# Patient Record
Sex: MALE | Race: BLACK OR AFRICAN AMERICAN | NOT HISPANIC OR LATINO | Employment: OTHER | URBAN - METROPOLITAN AREA
[De-identification: names, ages, dates, MRNs, and addresses within clinical notes are randomized per-mention and may not be internally consistent; named-entity substitution may affect disease eponyms.]

---

## 2023-05-30 ENCOUNTER — HOSPITAL ENCOUNTER (OUTPATIENT)
Dept: DATA CONVERSION | Facility: HOSPITAL | Age: 43
End: 2023-05-30

## 2023-07-14 ENCOUNTER — HOSPITAL ENCOUNTER (OUTPATIENT)
Dept: DATA CONVERSION | Facility: HOSPITAL | Age: 43
End: 2023-07-14
Attending: EMERGENCY MEDICINE

## 2023-07-17 ENCOUNTER — HOSPITAL ENCOUNTER (OUTPATIENT)
Dept: DATA CONVERSION | Facility: HOSPITAL | Age: 43
End: 2023-07-17

## 2023-07-25 ENCOUNTER — HOSPITAL ENCOUNTER (OUTPATIENT)
Dept: DATA CONVERSION | Facility: HOSPITAL | Age: 43
End: 2023-07-25
Attending: EMERGENCY MEDICINE

## 2023-07-25 DIAGNOSIS — Z53.21 PROCEDURE AND TREATMENT NOT CARRIED OUT DUE TO PATIENT LEAVING PRIOR TO BEING SEEN BY HEALTH CARE PROVIDER: ICD-10-CM

## 2023-08-16 ENCOUNTER — HOSPITAL ENCOUNTER (OUTPATIENT)
Dept: DATA CONVERSION | Facility: HOSPITAL | Age: 43
End: 2023-08-16
Attending: EMERGENCY MEDICINE

## 2023-08-16 DIAGNOSIS — Z53.21 PROCEDURE AND TREATMENT NOT CARRIED OUT DUE TO PATIENT LEAVING PRIOR TO BEING SEEN BY HEALTH CARE PROVIDER: ICD-10-CM

## 2023-10-02 ENCOUNTER — HOSPITAL ENCOUNTER (EMERGENCY)
Facility: HOSPITAL | Age: 43
Discharge: HOME | End: 2023-10-02
Payer: COMMERCIAL

## 2023-10-02 PROCEDURE — 4500999001 HC ED NO CHARGE

## 2023-10-03 ENCOUNTER — HOSPITAL ENCOUNTER (EMERGENCY)
Facility: HOSPITAL | Age: 43
Discharge: HOME | End: 2023-10-03
Attending: EMERGENCY MEDICINE
Payer: COMMERCIAL

## 2023-10-03 VITALS
OXYGEN SATURATION: 96 % | SYSTOLIC BLOOD PRESSURE: 105 MMHG | HEIGHT: 68 IN | TEMPERATURE: 98.2 F | DIASTOLIC BLOOD PRESSURE: 78 MMHG | RESPIRATION RATE: 18 BRPM | HEART RATE: 102 BPM | BODY MASS INDEX: 45.47 KG/M2 | WEIGHT: 300 LBS

## 2023-10-03 DIAGNOSIS — M25.532 LEFT WRIST PAIN: Primary | ICD-10-CM

## 2023-10-03 PROCEDURE — 99283 EMERGENCY DEPT VISIT LOW MDM: CPT | Performed by: EMERGENCY MEDICINE

## 2023-10-03 PROCEDURE — 99284 EMERGENCY DEPT VISIT MOD MDM: CPT | Performed by: EMERGENCY MEDICINE

## 2023-10-03 PROCEDURE — 2500000001 HC RX 250 WO HCPCS SELF ADMINISTERED DRUGS (ALT 637 FOR MEDICARE OP): Mod: SE | Performed by: STUDENT IN AN ORGANIZED HEALTH CARE EDUCATION/TRAINING PROGRAM

## 2023-10-03 PROCEDURE — 99282 EMERGENCY DEPT VISIT SF MDM: CPT

## 2023-10-03 RX ORDER — IBUPROFEN 600 MG/1
600 TABLET ORAL ONCE
Status: COMPLETED | OUTPATIENT
Start: 2023-10-03 | End: 2023-10-03

## 2023-10-03 RX ADMIN — IBUPROFEN 600 MG: 600 TABLET, FILM COATED ORAL at 02:54

## 2023-10-03 ASSESSMENT — PAIN SCALES - GENERAL
PAINLEVEL_OUTOF10: 5 - MODERATE PAIN
PAINLEVEL_OUTOF10: 1

## 2023-10-03 ASSESSMENT — LIFESTYLE VARIABLES
HAVE YOU EVER FELT YOU SHOULD CUT DOWN ON YOUR DRINKING: NO
HAVE PEOPLE ANNOYED YOU BY CRITICIZING YOUR DRINKING: NO
EVER HAD A DRINK FIRST THING IN THE MORNING TO STEADY YOUR NERVES TO GET RID OF A HANGOVER: NO
EVER FELT BAD OR GUILTY ABOUT YOUR DRINKING: NO

## 2023-10-03 ASSESSMENT — COLUMBIA-SUICIDE SEVERITY RATING SCALE - C-SSRS
2. HAVE YOU ACTUALLY HAD ANY THOUGHTS OF KILLING YOURSELF?: NO
1. IN THE PAST MONTH, HAVE YOU WISHED YOU WERE DEAD OR WISHED YOU COULD GO TO SLEEP AND NOT WAKE UP?: NO
6. HAVE YOU EVER DONE ANYTHING, STARTED TO DO ANYTHING, OR PREPARED TO DO ANYTHING TO END YOUR LIFE?: NO

## 2023-10-03 ASSESSMENT — PAIN DESCRIPTION - DESCRIPTORS: DESCRIPTORS: CRAMPING

## 2023-10-03 ASSESSMENT — PAIN - FUNCTIONAL ASSESSMENT: PAIN_FUNCTIONAL_ASSESSMENT: 0-10

## 2023-10-12 ENCOUNTER — HOSPITAL ENCOUNTER (EMERGENCY)
Facility: HOSPITAL | Age: 43
Discharge: HOME | End: 2023-10-12
Payer: COMMERCIAL

## 2023-10-12 VITALS
BODY MASS INDEX: 45.47 KG/M2 | SYSTOLIC BLOOD PRESSURE: 135 MMHG | HEART RATE: 96 BPM | TEMPERATURE: 98 F | WEIGHT: 300 LBS | DIASTOLIC BLOOD PRESSURE: 97 MMHG | RESPIRATION RATE: 16 BRPM | OXYGEN SATURATION: 98 % | HEIGHT: 68 IN

## 2023-10-12 VITALS
OXYGEN SATURATION: 98 % | SYSTOLIC BLOOD PRESSURE: 140 MMHG | RESPIRATION RATE: 20 BRPM | HEART RATE: 89 BPM | DIASTOLIC BLOOD PRESSURE: 92 MMHG | TEMPERATURE: 97.5 F

## 2023-10-12 DIAGNOSIS — M54.9 MUSCULOSKELETAL BACK PAIN: Primary | ICD-10-CM

## 2023-10-12 PROCEDURE — 99281 EMR DPT VST MAYX REQ PHY/QHP: CPT

## 2023-10-12 PROCEDURE — 99282 EMERGENCY DEPT VISIT SF MDM: CPT | Performed by: NURSE PRACTITIONER

## 2023-10-12 PROCEDURE — 99283 EMERGENCY DEPT VISIT LOW MDM: CPT

## 2023-10-12 PROCEDURE — 4500999001 HC ED NO CHARGE

## 2023-10-12 PROCEDURE — 99284 EMERGENCY DEPT VISIT MOD MDM: CPT | Performed by: NURSE PRACTITIONER

## 2023-10-12 RX ORDER — ACETAMINOPHEN 325 MG/1
650 TABLET ORAL EVERY 6 HOURS PRN
Qty: 30 TABLET | Refills: 0 | OUTPATIENT
Start: 2023-10-12 | End: 2023-11-02

## 2023-10-12 RX ORDER — ACETAMINOPHEN 325 MG/1
650 TABLET ORAL ONCE
Status: DISCONTINUED | OUTPATIENT
Start: 2023-10-12 | End: 2023-10-13 | Stop reason: HOSPADM

## 2023-10-12 ASSESSMENT — COLUMBIA-SUICIDE SEVERITY RATING SCALE - C-SSRS
1. IN THE PAST MONTH, HAVE YOU WISHED YOU WERE DEAD OR WISHED YOU COULD GO TO SLEEP AND NOT WAKE UP?: NO
1. IN THE PAST MONTH, HAVE YOU WISHED YOU WERE DEAD OR WISHED YOU COULD GO TO SLEEP AND NOT WAKE UP?: NO
6. HAVE YOU EVER DONE ANYTHING, STARTED TO DO ANYTHING, OR PREPARED TO DO ANYTHING TO END YOUR LIFE?: NO
6. HAVE YOU EVER DONE ANYTHING, STARTED TO DO ANYTHING, OR PREPARED TO DO ANYTHING TO END YOUR LIFE?: NO
2. HAVE YOU ACTUALLY HAD ANY THOUGHTS OF KILLING YOURSELF?: NO
2. HAVE YOU ACTUALLY HAD ANY THOUGHTS OF KILLING YOURSELF?: NO

## 2023-10-12 ASSESSMENT — PAIN DESCRIPTION - DESCRIPTORS: DESCRIPTORS: ACHING

## 2023-10-12 ASSESSMENT — PAIN - FUNCTIONAL ASSESSMENT: PAIN_FUNCTIONAL_ASSESSMENT: 0-10

## 2023-10-12 ASSESSMENT — PAIN SCALES - GENERAL: PAINLEVEL_OUTOF10: 7

## 2023-10-13 ENCOUNTER — HOSPITAL ENCOUNTER (EMERGENCY)
Facility: HOSPITAL | Age: 43
Discharge: HOME | End: 2023-10-13
Payer: COMMERCIAL

## 2023-10-13 ENCOUNTER — HOSPITAL ENCOUNTER (EMERGENCY)
Facility: HOSPITAL | Age: 43
Discharge: HOME | End: 2023-10-13
Attending: EMERGENCY MEDICINE
Payer: COMMERCIAL

## 2023-10-13 ENCOUNTER — HOSPITAL ENCOUNTER (EMERGENCY)
Facility: HOSPITAL | Age: 43
Discharge: LEFT BEFORE DISCHARGE | End: 2023-10-13
Payer: COMMERCIAL

## 2023-10-13 VITALS
HEART RATE: 88 BPM | TEMPERATURE: 96.8 F | WEIGHT: 300 LBS | HEIGHT: 68 IN | OXYGEN SATURATION: 96 % | BODY MASS INDEX: 45.47 KG/M2 | RESPIRATION RATE: 16 BRPM | DIASTOLIC BLOOD PRESSURE: 88 MMHG | SYSTOLIC BLOOD PRESSURE: 131 MMHG

## 2023-10-13 DIAGNOSIS — Z00.8 EVALUATION BY PSYCHIATRIC SERVICE REQUIRED: Primary | ICD-10-CM

## 2023-10-13 PROCEDURE — 4500999001 HC ED NO CHARGE

## 2023-10-13 PROCEDURE — 99284 EMERGENCY DEPT VISIT MOD MDM: CPT | Performed by: EMERGENCY MEDICINE

## 2023-10-13 PROCEDURE — 99281 EMR DPT VST MAYX REQ PHY/QHP: CPT | Performed by: EMERGENCY MEDICINE

## 2023-10-13 SDOH — HEALTH STABILITY: MENTAL HEALTH: WISH TO BE DEAD (PAST 1 MONTH): NO

## 2023-10-13 SDOH — HEALTH STABILITY: MENTAL HEALTH: SUICIDAL BEHAVIOR (LIFETIME): NO

## 2023-10-13 SDOH — HEALTH STABILITY: MENTAL HEALTH: NON-SPECIFIC ACTIVE SUICIDAL THOUGHTS (PAST 1 MONTH): NO

## 2023-10-13 SDOH — HEALTH STABILITY: MENTAL HEALTH: ANXIETY SYMPTOMS: GENERALIZED

## 2023-10-13 SDOH — HEALTH STABILITY: MENTAL HEALTH: HAVE YOU EVER TRIED TO KILL YOURSELF?: NO

## 2023-10-13 SDOH — ECONOMIC STABILITY: HOUSING INSECURITY: FEELS SAFE LIVING IN HOME: YES

## 2023-10-13 SDOH — HEALTH STABILITY: MENTAL HEALTH: DEPRESSION SYMPTOMS: LOSS OF INTEREST

## 2023-10-13 SDOH — HEALTH STABILITY: MENTAL HEALTH: IN THE PAST FEW WEEKS, HAVE YOU WISHED YOU WERE DEAD?: NO

## 2023-10-13 SDOH — HEALTH STABILITY: MENTAL HEALTH: ARE YOU HAVING THOUGHTS OF KILLING YOURSELF RIGHT NOW?: NO

## 2023-10-13 SDOH — HEALTH STABILITY: MENTAL HEALTH: IN THE PAST FEW WEEKS, HAVE YOU FELT THAT YOU OR YOUR FAMILY WOULD BE BETTER OFF IF YOU WERE DEAD?: NO

## 2023-10-13 SDOH — HEALTH STABILITY: MENTAL HEALTH: IN THE PAST WEEK, HAVE YOU BEEN HAVING THOUGHTS ABOUT KILLING YOURSELF?: NO

## 2023-10-13 ASSESSMENT — COLUMBIA-SUICIDE SEVERITY RATING SCALE - C-SSRS
2. HAVE YOU ACTUALLY HAD ANY THOUGHTS OF KILLING YOURSELF?: NO
6. HAVE YOU EVER DONE ANYTHING, STARTED TO DO ANYTHING, OR PREPARED TO DO ANYTHING TO END YOUR LIFE?: NO
1. SINCE LAST CONTACT, HAVE YOU WISHED YOU WERE DEAD OR WISHED YOU COULD GO TO SLEEP AND NOT WAKE UP?: NO

## 2023-10-13 ASSESSMENT — LIFESTYLE VARIABLES
SUBSTANCE_ABUSE_PAST_12_MONTHS: NO
PRESCIPTION_ABUSE_PAST_12_MONTHS: NO

## 2023-10-13 ASSESSMENT — PAIN SCALES - GENERAL: PAINLEVEL_OUTOF10: 0 - NO PAIN

## 2023-10-13 NOTE — PROGRESS NOTES
"EPAT - Social Work Psychiatric Assessment    Arrival Details  Mode of Arrival: Ambulatory  Admission Source: Home  Admission Type: Voluntary  EPAT Assessment Start Date: 10/13/23  EPAT Assessment Start Time: 0710  Name of : Joe Carrizales    History of Present Illness  Admission Reason: hallucinations  HPI: Patient is a 43 year old AA male with a history of Schizoaffective Disorder, Depression and Substance use. The patient came into the hospital last evening concerned about chest and back pain he had been having. He was examined and medically cleared by the provider and set up for discharge. Upon Discharge the patient reported he was experiencing hallucinations. He was not specific at the time. He did not endorse any suicidal or homicidal ideations. A review of the provider note was conducted.    SW Readmission Information   Readmission within 30 Days: No    Additional Symptoms - Adult  Generalized Anxiety Disorder: Difficulity concentrating  Obsessive Compulsive Disorder: No problems reported or observed.  Panic Attack: No problems reported or observed.  Post Traumatic Stress Disorder: No problems reported or observed.  Delirium: No problems reported or observed.    Past Psychiatric History/Meds/Treatments  Past Psychiatric History: Patient currently is open with [facility] for counseling and psychiatry. He does \"not remember the last time I went to see them\". He has a history of Schizoaffective Disorder, anxiety and depression. He has had past inpatient stays for hallucinations and other related symptoms. He denied any history of substance treatment. He has no history of suicide attempts but per past reports cut himself in the past.  Past Psychiatric Meds/Treatments: See medication list. Patient states he does take them but per past documentation he has a history of non compliance.  Past Violence/Victimization History: Patient denied    Current Mental Health Contacts   Name/Phone Number: Nando/Jose F " "[facility]   Last Appointment Date: unknown  Provider Name/Phone Number: Perlita Callums, [phone]  Provider Last Appointment Date: unknown    Support System: Community    Living Arrangement: House    Home Safety  Feels Safe Living in Home: Yes    Income Information  Employment Status for: Patient  Employment Status: Unemployed  Income Source: [employer] Service/Education History  Current or Previous  Service: None  Education Level: High school    Social/Cultural History  Social History: Patient is his own guardian. His stressors are his \"body ailments\" and hallucinations.  Important Activities:  (did not state)    Legal  Legal Concerns: no current probation or parole.    Drug Screening  Have you used any substances (canabis, cocaine, heroin, hallucinogens, inhalants, etc.) in the past 12 months?: No  Have you used any prescription drugs other than prescribed in the past 12 months?: No  Is a toxicology screen needed?: No         Psychosocial  Psychosocial (WDL): Within Defined Limits    Orientation  Orientation Level: Oriented X4    General Appearance  Motor Activity: Restlessness  Speech Pattern: Slow  General Attitude: Cooperative  Appearance/Hygiene: Body odor, Disheveled    Thought Process  Coherency:  (none)  Content: Unremarkable  Delusions:  (none)  Perception: Not altered  Hallucination: Auditory  Judgment/Insight: Limited  Confusion: None  Cognition: Appropriate judgement    Sleep Pattern  Sleep Pattern: Unable to assess    Risk Factors  Self Harm/Suicidal Ideation Plan: none  Previous Self Harm/Suicidal Plans: none  Risk Factors: None  Description of Thoughts/Ideas Leaving Unit Now: none    Violence Risk Assessment  Assessment of Violence: None noted  Thoughts of Harm to Others: No    Ability to Assess Risk Screen  Risk Screen - Ability to Assess: Able to be screened  Ask Suicide-Screening Questions  1. In the past few weeks, have you wished you were dead?: No  2. In the " past few weeks, have you felt that you or your family would be better off if you were dead?: No  3. In the past week, have you been having thoughts about killing yourself?: No  4. Have you ever tried to kill yourself?: No  5. Are you having thoughts of killing yourself right now?: No  Calculated Risk Score: No intervention is necessary  [place] Suicide Severity Rating Scale (Screener/Recent Self-Report)  1. Wish to be Dead (Past 1 Month): No  2. Non-Specific Active Suicidal Thoughts (Past 1 Month): No  6. Suicidal Behavior (Lifetime): No  Calculated C-SSRS Risk Score (Lifetime/Recent): No Risk Indicated  Step 1: Risk Factors  Current & Past Psychiatric Dx: Psychotic disorder  Presenting Symptoms: Anxiety and/or panic  Family History: Axis I psychiatric diagnosis requiring hospitalization  Precipitants/Stressors: Triggering events leading to humiliation, shame, and/or despair (e.g. loss of relationship, financial or health status) (real or anticipated)  Change in Treatment: Non-compliant or not receiving treatment  Access to Lethal Methods : No  Step 2: Protective Factors   Protective Factors Internal: Identifies reasons for living  Protective Factors External: Cultural, spiritual and/or moral attitudes against suicide  Step 3: Suicidal Ideation Intensity  Most Severe Suicidal Ideation Identified: none  How Many Times Have You Had These Thoughts: Less than once a week  When You Have the Thoughts How Long do They Last : Fleeting - few seconds or minutes  Could/Can You Stop Thinking About Killing Yourself or Wanting to Die if You Want to: Does not attempt to control thoughts  Are There Things - Anyone or Anything - That Stopped You From Wanting to Die or Acting on: Does not apply  What Sort of Reasons Did You Have For Thinking About Wanting to Die or Killing Yourself: Does not apply  Total Score: 2  Step 5: Documentation  Risk Level: Low suicide risk    Psychiatric Impression and Plan of Care  Assessment and Plan:  "Patient is a 43 year old AA male with a history of Schizoaffective Disorder, Depression and Anxiety who came to the ED with medical concerns. While being treated he stated to his providers that he was hearing voices. The patient has an extensive mental health history. He has had several inpatient stays over the course of a few years. He has a history of non compliance both with appointments and taking his medications. According to the patient and his past documentation he has a long history of chronic hallucinations. Today he stated he \"hears the devil\" but nothing is commanding. He reports he is \"in alot of pain (physical). The patient was appropriate and cooperative. He shared he still sees his providers at [facility] but cannot remember the last appointment. The patient has a history of non compliance with his medications but states he is currently taking them. He reports staying in a group home and feel safe there. Thep patient is help seeking and future oriented. He did appeare disheveled, poor hygiene and body odor. The patient also reports lack of sleep over the past few weeks. The patient is low risk to self harm. Dr. Ford agrees. He did not endorse any homicidal thoughts, hallucinations and did not appear internally stimulated. He stated he was going to \"see my people at [facility]\" for follow up.  Specific Resources Provided to Patient: [facility]   Notified: no  PHP/IOP Recommended: none  Specific Information Provided for PHP/IOP: none  Plan Comments: none    Outcome/Disposition  Patient's Perception of Outcome Achieved: Patient is help seeking  Assessment, Recommendations and Risk Level Reviewed with: Dr. Ford  Contact Name: Zi Caro  Contact Number(s): [phone]  Contact Relationship: father  EPAT Assessment Completed Date: 10/13/23  EPAT Assessment Completed Time: 0752  Patient Disposition: Home    Social Work Note  "

## 2023-10-13 NOTE — Clinical Note
Anibal Caro was seen and treated in our emergency department on 10/13/2023.  He may return to school on 10/13/2023.      If you have any questions or concerns, please don't hesitate to call.      Nadiya Carreon, DO

## 2023-10-13 NOTE — ED PROVIDER NOTES
Limitations to History: none   Additional History Obtained from: none   External records reviewed: prior ED note    Chief complaint:   Chief Complaint   Patient presents with    Psychiatric Evaluation          HPI:      Anibal Caro is a 43 y.o. male with past medical history of Schizophrenia and obesity presenting to the ED with concerns of hearing voices in his head.  Patient states that he normally hears voices, however now they are laughing but is unable to tell me what they are laughing at.  He states that they sound different than they usually are.  Denies any SI or HI at this time.  Is currently medication compliant.  Denies any other complaints at this time.      --------------------------------------------------------------------------------------------------------------------------------------    VS: As documented in the triage note and EMR flowsheet from this visit were reviewed.  Temp 36.6 °C (97.9 °F) HR 94 /80 RR 16 Sat 94 % on      Physical Exam:  GEN:  A&Ox3, no acute distress, appears comfortable. Conversational and appropriate.    HEENT: Normocephalic, atraumatic. Conjunctiva pink with no redness or exudates. Hearing grossly intact. Moist mucous membranes.  CARDIO: Normal rate and regular rhythm. Normal S1, S2  without murmurs, rubs, or gallops.   PULM: Clear to auscultation bilaterally. No rales, rhonchi, or wheezes. No accessory muscle use or stridor.  GI: Soft, non-tender, non-distended. No rebound tenderness or guarding.   SKIN: Warm and dry, no rashes, lesions, petechiae, or purpura.  MSK: ROM intact in all 4 extremities without contractures or pain. No peripheral edema, contusions, or wounds.    NEURO: No focal findings identified. No confusion or gross mental status changes.  PSYCH: Answers questions appropriately, intermittently laughs.        ---------------------------------------------------------------------------------------------------------------------------------------    Medical Decision Making:    All labs and imaging were independently reviewed by me.  Diagnoses as of 10/13/23 0719   Evaluation by psychiatric service required     Medications - No data to display        MDM:  Briefly, this is a 43 y.o. male with past medical history of Schizophrenia and obesity presenting to the ED with concerns of hearing voices in his head, requesting psych evaluation.  Patient has been seen in the emergency department for similar complaints consistently, but has never been admitted.  He is currently medication compliant at this time and denies any drug use.  I discussed EPAT evaluation with the patient and the team, which both are agreeable to patient will be signed out to oncoming team pending EPAT evaluation and pending disposition, however likely discharge home given that he has no SI/HI.      Impression:   1. Evaluation by psychiatric service required          Plan and Disposition: Signout to oncoming provider, pending disposition, probable discharge home.  Return if worse. They understand return precautions and discharge instructions. Patient was in agreement with this plan.        Pelon Johnson DO  Emergency Medicine, PGY-2            Patient was seen and evaluated by the attending physician. The attending ED physician agrees with the plan. Patient and/or patient´s representative was counseled regarding labs, imaging, likely diagnosis, and plan. All questions were answered.    Disclaimer: This note was dictated by speech recognition.  Attempt at proofreading was made to minimize errors.  Errors in transcription may be present.  Please call if questions.     Pelon Johnson DO  Resident  10/13/23 0719

## 2023-10-13 NOTE — ED PROVIDER NOTES
Emergency Department Encounter  [facility] EMERGENCY MEDICINE    Patient: Anibal Caro  MRN: [id number]  : 1980  Date of Evaluation: 10/12/2023  ED Provider: RIGOBERTO Nash      Chief Complaint       Chief Complaint   Patient presents with    body pain        Limitations to History: none  Historian: patient  Records reviewed: EMR inpatient and outpatient notes, Care Everywhere    This is a 43-year-old male well-known to the emergency room with a PMH of schizophrenia and obesity who presents to the emergency room with allover pain.  Patient states that he has back pain, arm pain and leg pain.  Patient states symptoms started several weeks ago.  Denies any fall or injury.  Denies any numbness, tingling or incontinence.  Denies taking any pain medications prior to arrival.  Patient describes his pain as a sharp, constant pain rating it a 10 out of 10.    PMH: Schizophrenia, obesity  PSH: Denies  Allergies: Denies  Social HX: + Smoker, occasional alcohol use, denies any drug use.  Family HX: No family history pertinent to current presenting problem  Medications: Reviewed per EMR    ROS:     Review of Systems  14 systems reviewed and otherwise acutely negative except as in the [place].    Physical Exam:    Appearance: Alert, oriented , cooperative,  in no acute distress. Well nourished & well hydrated.    Skin: Intact,  dry skin, no lesions, rash, petechiae or purpura.     ENT: Hearing grossly intact.     Neck: Supple, without meningismus.     Pulmonary: Clear bilaterally with good chest wall excursion. No rales, rhonchi or wheezing. No accessory muscle use or stridor.    Cardiac: Normal S1, S2 without murmur, rub, gallop or extrasystole. No JVD, Carotids without bruits.    Abdomen: Soft, nontender, active bowel sounds.  No palpable organomegaly.  No rebound or guarding.      Musculoskeletal: Full range of motion. No deformity. Pulses full and equal.  No midline spinal  "tenderness.    Neurological:  Normal sensation, no weakness, no focal findings identified.    Psychiatric: Appropriate mood and affect.       Past History   No past medical history on file.  Past Surgical History:   Procedure Laterality Date    CT AORTA AND BILATERAL ILIOFEMORAL RUNOFF ANGIOGRAM W AND/OR WO IV CONTRAST  11/3/2022    CT AORTA AND BILATERAL ILIOFEMORAL RUNOFF ANGIOGRAM W AND/OR WO IV CONTRAST 11/3/2022 [facility]         Medications/Allergies     Previous Medications    No medications on file     No Known Allergies     Physical Exam       ED Triage Vitals [10/12/23 2030]   Temp Heart Rate Resp BP   36.7 °C (98 °F) 96 16 (!) 135/97      SpO2 Temp src Heart Rate Source Patient Position   98 % -- -- --      BP Location FiO2 (%)     -- --         Patient remained stable while in the emergency department. Patient received Tylenol 650 mg PO once for pain.  Differentials include fracture, musculoskeletal pain, strain, sprain.  Previous outpatient and ED records were reviewed.  Patient does not have any neurological deficits to suggest cord injury or cord compression.  Patient was ambulatory in the emergency room.  Patient was discharged home with a prescription for Tylenol as needed for pain.  Patient was advised to follow-up with his primary care doctor return the emergency room with worsening symptoms.    Diagnostics   Labs:  No results found for this or any previous visit (from the past 24 hour(s)).   Radiographs:  No orders to display           Assessment   In brief, Anibal Caro is a 43 y.o. male who presented to the emergency department with back pain, arm pain and leg pain.        ED Course   Visit Vitals  BP (!) 135/97   Pulse 96   Temp 36.7 °C (98 °F)   Resp 16   Ht 1.727 m (5' 8\")   Wt 136 kg (300 lb)   SpO2 98%   BMI 45.61 kg/m²   Smoking Status Never   BSA 2.55 m²       Medications   acetaminophen (Tylenol) tablet 650 mg (has no administration in time range)           Final Impression  "     1. Musculoskeletal back pain          DISPOSITION  Disposition:     Comment: Please note this report has been produced using speech recognition software and may contain errors related to that system including errors in grammar, punctuation, and spelling, as well as words and phrases that may be inappropriate.  If there are any questions or concerns please feel free to contact the dictating provider for clarification.    ELVIA Nash-RIGOBERTO Tobias  10/12/23 2112

## 2023-10-13 NOTE — ED TRIAGE NOTES
"Pt with a history of schizophrenia comes in tonight stating that he has started hearing voices in his head; states that they are not commanding him to do anything \"just a bunch of laughing\"; states that these are voices he has not heard before which is why he would like to be seen; denies SI/HI/VH  "

## 2023-10-13 NOTE — PROGRESS NOTES
I received Anibal Caro in signout from Dr. Tere Johnson.  Please see the previous note for all HPI, PE and MDM up to the time of signout at 0700.    In brief Anibal Caro is an 43 y.o. male presenting for   Chief Complaint   Patient presents with    Psychiatric Evaluation   .  At the time of signout we were awaiting: EPAT evaluation    Patient is a 43-year-old male with a past medical history schizophrenia obesity presenting to the emergency department with concern for auditory hallucinations.  Patient denied any SI/HI both to me, EPAT, and previous provider per documentation.  EPAT assessed the patient and patient was handed off to me pending this assessment.  They were able to gain further ancillary history which confirmed that the patient was near his psychiatric baseline, did not appear internally stimulated, and appeared able to care for himself.  Patient had a safe disposition back to his group home, and a plan for outpatient follow-up with [facility] for psych.  Patient felt safe with this plan per my reevaluation conversation with him.  Was denying any other acute medical complaints at this time.  Patient will be discharged in stable condition.    Pt Disposition: Discharge    Procedures

## 2023-10-16 ENCOUNTER — HOSPITAL ENCOUNTER (EMERGENCY)
Facility: HOSPITAL | Age: 43
Discharge: HOME | End: 2023-10-16
Payer: COMMERCIAL

## 2023-10-16 ENCOUNTER — HOSPITAL ENCOUNTER (EMERGENCY)
Facility: HOSPITAL | Age: 43
Discharge: PSYCHIATRIC OR SUBSTANCE USE FACILITY | End: 2023-10-17
Attending: EMERGENCY MEDICINE
Payer: COMMERCIAL

## 2023-10-16 DIAGNOSIS — R44.3 HALLUCINATION: Primary | ICD-10-CM

## 2023-10-16 LAB
ALBUMIN SERPL BCP-MCNC: 3.9 G/DL (ref 3.4–5)
ALP SERPL-CCNC: 83 U/L (ref 33–120)
ALT SERPL W P-5'-P-CCNC: 20 U/L (ref 10–52)
ANION GAP SERPL CALC-SCNC: 10 MMOL/L (ref 10–20)
APAP SERPL-MCNC: <10 UG/ML
AST SERPL W P-5'-P-CCNC: 19 U/L (ref 9–39)
BASOPHILS # BLD AUTO: 0.06 X10*3/UL (ref 0–0.1)
BASOPHILS NFR BLD AUTO: 1.3 %
BILIRUB SERPL-MCNC: 0.2 MG/DL (ref 0–1.2)
BUN SERPL-MCNC: 15 MG/DL (ref 6–23)
CALCIUM SERPL-MCNC: 9.7 MG/DL (ref 8.6–10.6)
CHLORIDE SERPL-SCNC: 104 MMOL/L (ref 98–107)
CO2 SERPL-SCNC: 30 MMOL/L (ref 21–32)
CREAT SERPL-MCNC: 0.96 MG/DL (ref 0.5–1.3)
EOSINOPHIL # BLD AUTO: 0.05 X10*3/UL (ref 0–0.7)
EOSINOPHIL NFR BLD AUTO: 1.1 %
ERYTHROCYTE [DISTWIDTH] IN BLOOD BY AUTOMATED COUNT: 14.5 % (ref 11.5–14.5)
ETHANOL SERPL-MCNC: <10 MG/DL
GFR SERPL CREATININE-BSD FRML MDRD: >90 ML/MIN/1.73M*2
GLUCOSE SERPL-MCNC: 90 MG/DL (ref 74–99)
HCT VFR BLD AUTO: 44.3 % (ref 41–52)
HGB BLD-MCNC: 14.4 G/DL (ref 13.5–17.5)
IMM GRANULOCYTES # BLD AUTO: 0.02 X10*3/UL (ref 0–0.7)
IMM GRANULOCYTES NFR BLD AUTO: 0.4 % (ref 0–0.9)
LYMPHOCYTES # BLD AUTO: 1.33 X10*3/UL (ref 1.2–4.8)
LYMPHOCYTES NFR BLD AUTO: 28.5 %
MCH RBC QN AUTO: 27.4 PG (ref 26–34)
MCHC RBC AUTO-ENTMCNC: 32.5 G/DL (ref 32–36)
MCV RBC AUTO: 84 FL (ref 80–100)
MONOCYTES # BLD AUTO: 0.68 X10*3/UL (ref 0.1–1)
MONOCYTES NFR BLD AUTO: 14.6 %
NEUTROPHILS # BLD AUTO: 2.53 X10*3/UL (ref 1.2–7.7)
NEUTROPHILS NFR BLD AUTO: 54.1 %
NRBC BLD-RTO: 0 /100 WBCS (ref 0–0)
PLATELET # BLD AUTO: 285 X10*3/UL (ref 150–450)
PMV BLD AUTO: 10.6 FL (ref 7.5–11.5)
POTASSIUM SERPL-SCNC: 3.8 MMOL/L (ref 3.5–5.3)
PROT SERPL-MCNC: 7.4 G/DL (ref 6.4–8.2)
RBC # BLD AUTO: 5.26 X10*6/UL (ref 4.5–5.9)
SALICYLATES SERPL-MCNC: <3 MG/DL
SODIUM SERPL-SCNC: 140 MMOL/L (ref 136–145)
WBC # BLD AUTO: 4.7 X10*3/UL (ref 4.4–11.3)

## 2023-10-16 PROCEDURE — 93010 ELECTROCARDIOGRAM REPORT: CPT | Performed by: EMERGENCY MEDICINE

## 2023-10-16 PROCEDURE — 80329 ANALGESICS NON-OPIOID 1 OR 2: CPT | Mod: 59

## 2023-10-16 PROCEDURE — 4500999001 HC ED NO CHARGE

## 2023-10-16 PROCEDURE — 85025 COMPLETE CBC W/AUTO DIFF WBC: CPT | Mod: CMCLAB

## 2023-10-16 PROCEDURE — 99285 EMERGENCY DEPT VISIT HI MDM: CPT | Performed by: EMERGENCY MEDICINE

## 2023-10-16 PROCEDURE — 82550 ASSAY OF CK (CPK): CPT | Mod: CMCLAB

## 2023-10-16 PROCEDURE — 80053 COMPREHEN METABOLIC PANEL: CPT | Mod: CMCLAB

## 2023-10-16 PROCEDURE — 36415 COLL VENOUS BLD VENIPUNCTURE: CPT

## 2023-10-16 PROCEDURE — 2500000001 HC RX 250 WO HCPCS SELF ADMINISTERED DRUGS (ALT 637 FOR MEDICARE OP): Mod: SE

## 2023-10-16 PROCEDURE — 99284 EMERGENCY DEPT VISIT MOD MDM: CPT | Performed by: EMERGENCY MEDICINE

## 2023-10-16 PROCEDURE — 80320 DRUG SCREEN QUANTALCOHOLS: CPT | Mod: CMCLAB

## 2023-10-16 RX ORDER — METHOCARBAMOL 500 MG/1
500 TABLET, FILM COATED ORAL ONCE
Status: COMPLETED | OUTPATIENT
Start: 2023-10-16 | End: 2023-10-16

## 2023-10-16 RX ORDER — ACETAMINOPHEN 325 MG/1
650 TABLET ORAL ONCE
Status: COMPLETED | OUTPATIENT
Start: 2023-10-16 | End: 2023-10-16

## 2023-10-16 RX ADMIN — METHOCARBAMOL TABLETS 500 MG: 500 TABLET, COATED ORAL at 16:50

## 2023-10-16 RX ADMIN — ACETAMINOPHEN 650 MG: 325 TABLET ORAL at 16:50

## 2023-10-16 SDOH — HEALTH STABILITY: MENTAL HEALTH: BEHAVIORS/MOOD: OTHER (COMMENT)

## 2023-10-16 ASSESSMENT — COLUMBIA-SUICIDE SEVERITY RATING SCALE - C-SSRS
6. HAVE YOU EVER DONE ANYTHING, STARTED TO DO ANYTHING, OR PREPARED TO DO ANYTHING TO END YOUR LIFE?: NO
1. IN THE PAST MONTH, HAVE YOU WISHED YOU WERE DEAD OR WISHED YOU COULD GO TO SLEEP AND NOT WAKE UP?: NO
2. HAVE YOU ACTUALLY HAD ANY THOUGHTS OF KILLING YOURSELF?: NO
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
## 2023-10-16 NOTE — ED PROVIDER NOTES
HPI   Chief Complaint   Patient presents with    Back Pain    Arm Injury       Patient is a 43-year-old male with history of schizophrenia and obesity, presenting to the emergency department today for mental health assessment, and lower back pain/left hand pain.  Patient reports since his discharge from the emergency department 3 days ago that his mental health has been worsening. He reports he is now hearing voices from the devil and is worried that he might do something to hurt himself.  Has attempted self injury before by slicing his left wrist in the past.  Frequently hears voices telling him to harm himself.  Reports for the past couple months/years now has had difficulty with hand movements and feels like his hand getting weaker as well. Reports lower back pain located to the Lumbar region, no saddle anesthesia or urinary incontinence.  Denies homicidal ideation today.  Reports no fevers no chills, chest pain, shortness of breath, abdominal pain, or change in urination/stool.                      No data recorded                Patient History   History reviewed. No pertinent past medical history.  Past Surgical History:   Procedure Laterality Date    CT AORTA AND BILATERAL ILIOFEMORAL RUNOFF ANGIOGRAM W AND/OR WO IV CONTRAST  11/3/2022    CT AORTA AND BILATERAL ILIOFEMORAL RUNOFF ANGIOGRAM W AND/OR WO IV CONTRAST 11/3/2022 [facility] [place] [facility]     No family history on file.  Social History     Tobacco Use    Smoking status: Never    Smokeless tobacco: Never   Vaping Use    Vaping Use: Never used   Substance Use Topics    Alcohol use: Never    Drug use: Not on file       Physical Exam   ED Triage Vitals [10/16/23 1553]   Temp Heart Rate Resp BP   36.6 °C (97.9 °F) 93 16 (!) 178/100      SpO2 Temp Source Heart Rate Source Patient Position   98 % Temporal -- --      BP Location FiO2 (%)     -- --       Physical Exam    ED Course & MDM   Diagnoses as of 10/17/23 1507   Hallucination       Medical Decision  "Making  Patient is a 43-year-old male presenting to the emergency department today for mental health check and lower back pain.  On arrival, blood pressure 178/100.  Rest of vitals within normal limits.  On examination, patient appears disheveled has a poor body odor. Is not taking care of himself at this time.  Reports he has been taking his Haldol and Cogentin and has been compliant.  Is calm and cooperative and able to answer questions appropriately in the emergency department.  Reports that the voices have been talking to him a lot more recently and is worried that he might do something bad.  When asked if he has an active plan for hurting himself today he says no, however reports he has heard a lot of voices telling him to harm himself recently he is aware of these and afraid of them. Says \"I have a cloudy brain and cloudy pain\".  He was assessed 3 days ago for similar complaint and at that time was found to be not a threat to himself.  However he given his recent presentations and return back to the emergency department today, will be engage EPAT for evaluation Robaxin and tylenol given for pain. Patient signed out to oncoming provider with EPAT placement pending.    Nitish Rodriguez MD  Emergency Medicine PGY2      Procedure  Procedures     Nitish Rodriguez MD  Resident  10/17/23 1510    "

## 2023-10-17 VITALS
SYSTOLIC BLOOD PRESSURE: 124 MMHG | HEART RATE: 78 BPM | OXYGEN SATURATION: 100 % | TEMPERATURE: 98.1 F | RESPIRATION RATE: 18 BRPM | DIASTOLIC BLOOD PRESSURE: 67 MMHG

## 2023-10-17 LAB
AMPHETAMINES UR QL SCN: NORMAL
APPEARANCE UR: CLEAR
BARBITURATES UR QL SCN: NORMAL
BENZODIAZ UR QL SCN: NORMAL
BILIRUB UR STRIP.AUTO-MCNC: NEGATIVE MG/DL
BZE UR QL SCN: NORMAL
CANNABINOIDS UR QL SCN: NORMAL
CK SERPL-CCNC: 324 U/L (ref 0–325)
COLOR UR: YELLOW
FENTANYL+NORFENTANYL UR QL SCN: NORMAL
GLUCOSE UR STRIP.AUTO-MCNC: NEGATIVE MG/DL
KETONES UR STRIP.AUTO-MCNC: NEGATIVE MG/DL
LEUKOCYTE ESTERASE UR QL STRIP.AUTO: NEGATIVE
NITRITE UR QL STRIP.AUTO: NEGATIVE
OPIATES UR QL SCN: NORMAL
OXYCODONE+OXYMORPHONE UR QL SCN: NORMAL
PCP UR QL SCN: NORMAL
PH UR STRIP.AUTO: 6 [PH]
PROT UR STRIP.AUTO-MCNC: NEGATIVE MG/DL
RBC # UR STRIP.AUTO: NEGATIVE /UL
SARS-COV-2 RNA RESP QL NAA+PROBE: NOT DETECTED
SP GR UR STRIP.AUTO: 1.01
UROBILINOGEN UR STRIP.AUTO-MCNC: <2 MG/DL

## 2023-10-17 PROCEDURE — 81003 URINALYSIS AUTO W/O SCOPE: CPT | Performed by: STUDENT IN AN ORGANIZED HEALTH CARE EDUCATION/TRAINING PROGRAM

## 2023-10-17 PROCEDURE — 87635 SARS-COV-2 COVID-19 AMP PRB: CPT

## 2023-10-17 PROCEDURE — 80307 DRUG TEST PRSMV CHEM ANLYZR: CPT | Mod: CMCLAB | Performed by: STUDENT IN AN ORGANIZED HEALTH CARE EDUCATION/TRAINING PROGRAM

## 2023-10-17 NOTE — ED NOTES
ASSUMED C/O PT VS LISTED PT COVID SWAB SENT AWAITING ON BED PLACEMENT. ALL BELONGINGS SECURED. PT PLACED IN 2 GOWNS SEARCHED SAFE. PT COOPERATIVE.     Katelyn Dimas RN  10/17/23 0893

## 2023-10-18 NOTE — PROGRESS NOTES
ED care was transitioned to me.  Please see the earlier provider note for further details.  Patient is pending placement at a psych care facility.    Patient was accepted to [facility] at [place] with Dr. Mosquera for acutely decompensated schizophrenia.  Patient has not required any additional medications.

## 2023-10-18 NOTE — SIGNIFICANT EVENT
Application for Emergency Admission      Ready for Transfer?  Is the patient medically cleared for transfer to inpatient psychiatry: Yes  Has the patient been accepted to an inpatient psychiatric hospital: Yes    Application for Emergency Admission  IN ACCORDANCE WITH [place]  The Chief Clinical Officer of: Almas rinaldi Jamie 10/17/2023 .8:41 PM    Reason for Hospitalization  The undersigned has reason to believe that: Anibal Caro Is a mentally ill person subject to hospitalization by court order under division B Section 5122.01 of the Revised Code, i.e., this person:    1.Yes  Represents a substantial risk of physical harm to self as manifested by evidence of threats of, or attempts at, suicide or serious self-inflicted bodily harm    2.Yes Represents a substantial risk of physical harm to others as manifested by evidence of recent homicidal or other violent behavior, evidence of recent threats that place another in reasonable fear of violent behavior and serious physical harm, or other evidence of present dangerousness    3.Yes Represents a substantial and immediate risk of serious physical impairment or injury to self as manifested by  evidence that the person is unable to provide for and is not providing for the person's basic physical needs because of the person's mental illness and that appropriate provision for those needs cannot be made  immediately available in the community    4.Yes Would benefit from treatment in a hospital for his mental illness and is in need of such treatment as manifested by evidence of behavior that creates a grave and imminent risk to substantial rights of others or  himself.    5.Yes Would benefit from treatment as manifested by evidence of behavior that indicates all of the following:       (a) The person is unlikely to survive safely in the community without supervision, based on a clinical determination.       (b) The person has a history of lack of  compliance with treatment for mental illness and one of the following applies:      (i) At least twice within the thirty-six months prior to the filing of an affidavit seeking court-ordered treatment of the person under section 5122.111 of the Revised Code, the lack of compliance has been a significant factor in necessitating hospitalization in a hospital or receipt of services in a forensic or other mental health unit of a correctional facility, provided that the thirty-six-month period shall be extended by the length of any hospitalization or incarceration of the person that occurred within the thirty-six-month period.      (ii) Within the forty-eight months prior to the filing of an affidavit seeking court-ordered treatment of the person under section 5122.111 of the Revised Code, the lack of compliance resulted in one or more acts of serious violent behavior toward self or others or threats of, or attempts at, serious physical harm to self or others, provided that the forty-eight-month period shall be extended by the length of any hospitalization or incarceration of the person that occurred within the forty-eight-month period.      (c) The person, as a result of mental illness, is unlikely to voluntarily participate in necessary treatment.       (d) In view of the person's treatment history and current behavior, the person is in need of treatment in order to prevent a relapse or deterioration that would be likely to result in substantial risk of serious harm to the person or others.    (e) Represents a substantial risk of physical harm to self or others if allowed to remain at liberty pending examination.    Therefore, it is requested that said person be admitted to the above named facility.    STATEMENT OF BELIEF    Must be filled out by one of the following: a psychiatrist, licensed physician, licensed clinical psychologist, health or ,  or .  (Statement shall include the  circumstances under which the individual was taken into custody and the reason for the person's belief that hospitalization is necessary. The statement shall also include a reference to efforts made to secure the individual's property at his residence if he was taken into custody there. Every reasonable and appropriate effort should be made to take this person into custody in the least conspicuous manner possible.)    Patient is a 43-year-old male who is at risk of harming himself.  Patient has decompensated schizophrenia with auditory hallucinations.    Kelsey Peralta DO 10/17/2023     _____________________________________________________________   Place of Employment: [employer] [facility] [employer]    STATEMENT OF OBSERVATION BY PSYCHIATRIST, LICENSED PHYSICIAN, OR LICENSED CLINICAL PSYCHOLOGIST, IF APPLICABLE    Place of Observation (e.g., [facility] mental health center, general hospital, office, emergency facility)  (If applicable, please complete)    Kelsey Peralta DO 10/17/2023    _____________________________________________________________

## 2023-11-02 ENCOUNTER — HOSPITAL ENCOUNTER (EMERGENCY)
Facility: HOSPITAL | Age: 43
Discharge: HOME | End: 2023-11-02
Payer: COMMERCIAL

## 2023-11-02 ENCOUNTER — APPOINTMENT (OUTPATIENT)
Dept: RADIOLOGY | Facility: HOSPITAL | Age: 43
End: 2023-11-02
Payer: COMMERCIAL

## 2023-11-02 VITALS
DIASTOLIC BLOOD PRESSURE: 100 MMHG | BODY MASS INDEX: 45.47 KG/M2 | SYSTOLIC BLOOD PRESSURE: 145 MMHG | HEIGHT: 68 IN | TEMPERATURE: 97.7 F | HEART RATE: 100 BPM | WEIGHT: 300 LBS | RESPIRATION RATE: 16 BRPM | OXYGEN SATURATION: 99 %

## 2023-11-02 DIAGNOSIS — S93.402A SPRAIN OF LEFT ANKLE, INITIAL ENCOUNTER: Primary | ICD-10-CM

## 2023-11-02 PROCEDURE — 99284 EMERGENCY DEPT VISIT MOD MDM: CPT | Performed by: PHYSICIAN ASSISTANT

## 2023-11-02 PROCEDURE — 73610 X-RAY EXAM OF ANKLE: CPT | Mod: LEFT SIDE | Performed by: RADIOLOGY

## 2023-11-02 PROCEDURE — 99283 EMERGENCY DEPT VISIT LOW MDM: CPT | Mod: 25

## 2023-11-02 PROCEDURE — 73610 X-RAY EXAM OF ANKLE: CPT | Mod: LT

## 2023-11-02 RX ORDER — ACETAMINOPHEN 325 MG/1
975 TABLET ORAL ONCE
Status: COMPLETED | OUTPATIENT
Start: 2023-11-02 | End: 2023-11-02

## 2023-11-02 RX ORDER — ACETAMINOPHEN 325 MG/1
TABLET ORAL
Status: COMPLETED
Start: 2023-11-02 | End: 2023-11-02

## 2023-11-02 RX ORDER — IBUPROFEN 600 MG/1
600 TABLET ORAL EVERY 6 HOURS PRN
Qty: 28 TABLET | Refills: 0 | Status: SHIPPED | OUTPATIENT
Start: 2023-11-02 | End: 2023-11-09

## 2023-11-02 RX ORDER — ACETAMINOPHEN 500 MG
1000 TABLET ORAL EVERY 8 HOURS PRN
Qty: 30 TABLET | Refills: 0 | Status: SHIPPED | OUTPATIENT
Start: 2023-11-02 | End: 2023-11-12

## 2023-11-02 RX ADMIN — ACETAMINOPHEN 975 MG: 325 TABLET ORAL at 10:08

## 2023-11-02 ASSESSMENT — COLUMBIA-SUICIDE SEVERITY RATING SCALE - C-SSRS
1. IN THE PAST MONTH, HAVE YOU WISHED YOU WERE DEAD OR WISHED YOU COULD GO TO SLEEP AND NOT WAKE UP?: NO
6. HAVE YOU EVER DONE ANYTHING, STARTED TO DO ANYTHING, OR PREPARED TO DO ANYTHING TO END YOUR LIFE?: NO
2. HAVE YOU ACTUALLY HAD ANY THOUGHTS OF KILLING YOURSELF?: NO

## 2023-11-02 NOTE — ED PROVIDER NOTES
Emergency Department Encounter  [facility] EMERGENCY MEDICINE    Patient: Anibal Caro  MRN: [id number]  : 1980  Date of Evaluation: 2023  ED Provider: Belkys Rene PA-C      Chief Complaint       Chief Complaint   Patient presents with    Ankle Pain     HPI    Anibal Caro is a 43 y.o. male who presents to the emergency department complaining of atraumatic L ankle pain. Pt reports he walks a lot. Concerned he has an ankle fracture because it hurts to fully bear weight. Not taking nay medications for his pain. No associated paresthesias or weakness. No associated wounds.    ROS:     Review of Systems  14 systems reviewed and otherwise acutely negative except as in the HPI.    Past History   No past medical history on file.  Past Surgical History:   Procedure Laterality Date    CT AORTA AND BILATERAL ILIOFEMORAL RUNOFF ANGIOGRAM W AND/OR WO IV CONTRAST  11/3/2022    CT AORTA AND BILATERAL ILIOFEMORAL RUNOFF ANGIOGRAM W AND/OR WO IV CONTRAST 11/3/2022 [facility] EMERGENCY LEGACY     Social History     Socioeconomic History    Marital status: Single     Spouse name: Not on file    Number of children: Not on file    Years of education: Not on file    Highest education level: Not on file   Occupational History    Not on file   Tobacco Use    Smoking status: Never    Smokeless tobacco: Never   Vaping Use    Vaping Use: Never used   Substance and Sexual Activity    Alcohol use: Never    Drug use: Not on file    Sexual activity: Not on file   Other Topics Concern    Not on file   Social History Narrative    Not on file     Social Determinants of Health     Financial Resource Strain: Not on file   Food Insecurity: Not on file   Transportation Needs: Not on file   Physical Activity: Not on file   Stress: Not on file   Social Connections: Not on file   Intimate Partner Violence: Not on file   Housing Stability: Not on file       Medications/Allergies     Previous Medications    No medications on  "file     No Known Allergies     Physical Exam       ED Triage Vitals [11/02/23 0947]   Temp Heart Rate Resp BP   36.5 °C (97.7 °F) 100 16 (!) 145/100      SpO2 Temp Source Heart Rate Source Patient Position   99 % Temporal -- --      BP Location FiO2 (%)     -- --         Physical Exam    Physical Exam:    VS: As documented in the triage note and EMR flowsheet from this visit were reviewed.    Appearance: Alert, oriented, cooperative, in no acute distress. Well nourished & well hydrated.    Skin: Atraumatic. Warm, intact and dry.     Neck: Supple.    Pulmonary: Clear bilaterally with good chest wall excursion. No rales, rhonchi or wheezing.     Cardiac: Normal S1, S2.    Musculoskeletal: Spontaneously moving all extremities without limitation. Extremities warm and well-perfused, capillary refill less than 2 seconds. Pulses full and equal. No lower extremity erythema, edema or increased warmth. No deformity noted.    Neurological:  Normal sensation, no weakness.    Psychiatric: Flat affect. Unkempt appearance.      Diagnostics   Radiographs:  XR ankle left 3+ views   Final Result   As above without acute osseous injury evident.        I personally reviewed the images/study and I agree with the findings   as stated. This study was interpreted at [place], [place].        MACRO:   None        Signed by: Rajeev Ashford 11/2/2023 10:48 AM   Dictation workstation:   [id number]          ED Course   Visit Vitals  BP (!) 145/100   Pulse 100   Temp 36.5 °C (97.7 °F) (Temporal)   Resp 16   Ht 1.727 m (5' 8\")   Wt 136 kg (300 lb)   SpO2 99%   BMI 45.61 kg/m²   Smoking Status Never   BSA 2.55 m²     Medications   acetaminophen (Tylenol) tablet 975 mg (975 mg oral Given 11/2/23 1008)       Medical Decision Making     Diagnoses as of 11/02/23 1108   Sprain of left ankle, initial encounter     Xrs negative for acute findings. Rx for tylenol and motrin as needed. Follow up with " PCP.      Final Impression      1. Sprain of left ankle, initial encounter          DISPOSITION  Disposition: discharge  Patient condition is: Stable    Comment: Please note this report has been produced using speech recognition software and may contain errors related to that system including errors in grammar, punctuation, and spelling, as well as words and phrases that may be inappropriate.  If there are any questions or concerns please feel free to contact the dictating provider for clarification.    ROM Serna PA-C  11/02/23 1101

## 2023-11-08 ENCOUNTER — HOSPITAL ENCOUNTER (EMERGENCY)
Facility: HOSPITAL | Age: 43
Discharge: HOME | End: 2023-11-08
Attending: STUDENT IN AN ORGANIZED HEALTH CARE EDUCATION/TRAINING PROGRAM
Payer: COMMERCIAL

## 2023-11-08 VITALS
OXYGEN SATURATION: 99 % | BODY MASS INDEX: 45.47 KG/M2 | SYSTOLIC BLOOD PRESSURE: 106 MMHG | WEIGHT: 300 LBS | TEMPERATURE: 97.7 F | RESPIRATION RATE: 16 BRPM | HEART RATE: 97 BPM | DIASTOLIC BLOOD PRESSURE: 70 MMHG | HEIGHT: 68 IN

## 2023-11-08 DIAGNOSIS — Z59.00 HOMELESSNESS: ICD-10-CM

## 2023-11-08 DIAGNOSIS — R45.851 PASSIVE SUICIDAL IDEATIONS: Primary | ICD-10-CM

## 2023-11-08 LAB
ALBUMIN SERPL BCP-MCNC: 3.7 G/DL (ref 3.4–5)
ALP SERPL-CCNC: 82 U/L (ref 33–120)
ALT SERPL W P-5'-P-CCNC: 19 U/L (ref 10–52)
ANION GAP SERPL CALC-SCNC: 15 MMOL/L (ref 10–20)
APAP SERPL-MCNC: <10 UG/ML
AST SERPL W P-5'-P-CCNC: 15 U/L (ref 9–39)
BASOPHILS # BLD AUTO: 0.08 X10*3/UL (ref 0–0.1)
BASOPHILS NFR BLD AUTO: 1.1 %
BILIRUB SERPL-MCNC: 0.2 MG/DL (ref 0–1.2)
BUN SERPL-MCNC: 22 MG/DL (ref 6–23)
CALCIUM SERPL-MCNC: 9.5 MG/DL (ref 8.6–10.6)
CHLORIDE SERPL-SCNC: 105 MMOL/L (ref 98–107)
CO2 SERPL-SCNC: 27 MMOL/L (ref 21–32)
CREAT SERPL-MCNC: 0.92 MG/DL (ref 0.5–1.3)
EOSINOPHIL # BLD AUTO: 0.12 X10*3/UL (ref 0–0.7)
EOSINOPHIL NFR BLD AUTO: 1.7 %
ERYTHROCYTE [DISTWIDTH] IN BLOOD BY AUTOMATED COUNT: 14.5 % (ref 11.5–14.5)
ETHANOL SERPL-MCNC: <10 MG/DL
GFR SERPL CREATININE-BSD FRML MDRD: >90 ML/MIN/1.73M*2
GLUCOSE SERPL-MCNC: 103 MG/DL (ref 74–99)
HCT VFR BLD AUTO: 46.5 % (ref 41–52)
HGB BLD-MCNC: 14.6 G/DL (ref 13.5–17.5)
IMM GRANULOCYTES # BLD AUTO: 0.02 X10*3/UL (ref 0–0.7)
IMM GRANULOCYTES NFR BLD AUTO: 0.3 % (ref 0–0.9)
LYMPHOCYTES # BLD AUTO: 1.99 X10*3/UL (ref 1.2–4.8)
LYMPHOCYTES NFR BLD AUTO: 27.8 %
MCH RBC QN AUTO: 27.3 PG (ref 26–34)
MCHC RBC AUTO-ENTMCNC: 31.4 G/DL (ref 32–36)
MCV RBC AUTO: 87 FL (ref 80–100)
MONOCYTES # BLD AUTO: 1.07 X10*3/UL (ref 0.1–1)
MONOCYTES NFR BLD AUTO: 15 %
NEUTROPHILS # BLD AUTO: 3.87 X10*3/UL (ref 1.2–7.7)
NEUTROPHILS NFR BLD AUTO: 54.1 %
NRBC BLD-RTO: 0 /100 WBCS (ref 0–0)
PLATELET # BLD AUTO: 293 X10*3/UL (ref 150–450)
POTASSIUM SERPL-SCNC: 4.7 MMOL/L (ref 3.5–5.3)
PROT SERPL-MCNC: 6.4 G/DL (ref 6.4–8.2)
RBC # BLD AUTO: 5.35 X10*6/UL (ref 4.5–5.9)
SALICYLATES SERPL-MCNC: <3 MG/DL
SODIUM SERPL-SCNC: 142 MMOL/L (ref 136–145)
WBC # BLD AUTO: 7.2 X10*3/UL (ref 4.4–11.3)

## 2023-11-08 PROCEDURE — 99285 EMERGENCY DEPT VISIT HI MDM: CPT | Performed by: STUDENT IN AN ORGANIZED HEALTH CARE EDUCATION/TRAINING PROGRAM

## 2023-11-08 PROCEDURE — 36415 COLL VENOUS BLD VENIPUNCTURE: CPT | Performed by: STUDENT IN AN ORGANIZED HEALTH CARE EDUCATION/TRAINING PROGRAM

## 2023-11-08 PROCEDURE — 99285 EMERGENCY DEPT VISIT HI MDM: CPT | Mod: 25 | Performed by: STUDENT IN AN ORGANIZED HEALTH CARE EDUCATION/TRAINING PROGRAM

## 2023-11-08 PROCEDURE — 84075 ASSAY ALKALINE PHOSPHATASE: CPT | Performed by: STUDENT IN AN ORGANIZED HEALTH CARE EDUCATION/TRAINING PROGRAM

## 2023-11-08 PROCEDURE — 80320 DRUG SCREEN QUANTALCOHOLS: CPT | Performed by: STUDENT IN AN ORGANIZED HEALTH CARE EDUCATION/TRAINING PROGRAM

## 2023-11-08 PROCEDURE — 94760 N-INVAS EAR/PLS OXIMETRY 1: CPT

## 2023-11-08 PROCEDURE — 93010 ELECTROCARDIOGRAM REPORT: CPT | Performed by: STUDENT IN AN ORGANIZED HEALTH CARE EDUCATION/TRAINING PROGRAM

## 2023-11-08 PROCEDURE — 99222 1ST HOSP IP/OBS MODERATE 55: CPT

## 2023-11-08 PROCEDURE — 99284 EMERGENCY DEPT VISIT MOD MDM: CPT | Mod: 25

## 2023-11-08 PROCEDURE — 85025 COMPLETE CBC W/AUTO DIFF WBC: CPT | Performed by: STUDENT IN AN ORGANIZED HEALTH CARE EDUCATION/TRAINING PROGRAM

## 2023-11-08 RX ORDER — AMLODIPINE BESYLATE 5 MG/1
5 TABLET ORAL
COMMUNITY
Start: 2023-05-13

## 2023-11-08 RX ORDER — BENZTROPINE MESYLATE 1 MG/1
1 TABLET ORAL NIGHTLY
COMMUNITY
Start: 2023-05-12 | End: 2023-12-18 | Stop reason: SDUPTHER

## 2023-11-08 RX ORDER — TRAZODONE HYDROCHLORIDE 150 MG/1
TABLET ORAL
COMMUNITY

## 2023-11-08 RX ORDER — MIDAZOLAM HYDROCHLORIDE 1 MG/ML
5 INJECTION INTRAMUSCULAR; INTRAVENOUS ONCE
Status: DISCONTINUED | OUTPATIENT
Start: 2023-11-08 | End: 2023-11-08 | Stop reason: HOSPADM

## 2023-11-08 RX ORDER — DIVALPROEX SODIUM 500 MG/1
500 TABLET, DELAYED RELEASE ORAL 2 TIMES DAILY
COMMUNITY
Start: 2019-06-20

## 2023-11-08 RX ORDER — GABAPENTIN 300 MG/1
CAPSULE ORAL
COMMUNITY

## 2023-11-08 RX ORDER — ERGOCALCIFEROL 1.25 MG/1
50000 CAPSULE ORAL
COMMUNITY
Start: 2020-07-12

## 2023-11-08 RX ORDER — MIDAZOLAM HYDROCHLORIDE 5 MG/ML
INJECTION, SOLUTION INTRAMUSCULAR; INTRAVENOUS
Status: DISCONTINUED
Start: 2023-11-08 | End: 2023-11-08 | Stop reason: WASHOUT

## 2023-11-08 RX ORDER — ALBUTEROL SULFATE 90 UG/1
AEROSOL, METERED RESPIRATORY (INHALATION) EVERY 4 HOURS PRN
COMMUNITY
Start: 2022-09-15

## 2023-11-08 RX ORDER — OLANZAPINE 10 MG/2ML
10 INJECTION, POWDER, FOR SOLUTION INTRAMUSCULAR ONCE
Status: DISCONTINUED | OUTPATIENT
Start: 2023-11-08 | End: 2023-11-08 | Stop reason: HOSPADM

## 2023-11-08 RX ORDER — RISPERIDONE 2 MG/1
3 TABLET ORAL
COMMUNITY
Start: 2018-08-06

## 2023-11-08 RX ORDER — HYDROXYZINE HYDROCHLORIDE 50 MG/1
TABLET, FILM COATED ORAL 2 TIMES DAILY PRN
COMMUNITY

## 2023-11-08 RX ORDER — ACETAMINOPHEN 500 MG
125 TABLET ORAL
COMMUNITY
Start: 2022-09-15

## 2023-11-08 SDOH — ECONOMIC STABILITY - HOUSING INSECURITY: HOMELESSNESS UNSPECIFIED: Z59.00

## 2023-11-08 NOTE — PROGRESS NOTES
"Anibal Caro is a 43 y.o. male. SW consulted in order to assist patient with resources to obtain housing. Patient states that his group home has \"shut down\", resulting in him having nowhere to go. This writer has worked with patient in the past, and patient has appeared to gain weight, and is physically unclean. Patient reports sleeping in the street.   Patient and SW called to attempt to place patient at the [facility]. Patient became frustrated with the line of questioning.  Patient stated, \"I'll just stay in a david and be homeless\". SW explained options to patient, and that the SD would allow patient to work with case management and resources to obtain housing; SW could not confirm the closure but understands that patient may not return.  Patient states that a bus pass would be sufficient. This writer began to work with another patient, and upon returning Mr. Caro had walked outside. Patient's pants were below his rear-end and he did not have underclothing on. Upon asking patient if he needed help or new clothes, he became aggressive with ED Staff.  This writer attempted to re-direct patient, who was sitting on a bench outside with his buttocks exposed. Patient told this writer, \"I will punch you in yoiur face, stay out of my business\". This writer would llike to note that patient has not presented this way to this writer in the past; seemingly declining. SW is not aware at this time if alcohol and/or substances are involved in patient's life still; patient maintains that he is sober at this time.  Upon discharge, patient was escorted by security and police, with an option to walk away. Patient chose to walk away from the hospital, on his own; SW is not fully aware of where patient will go.    "

## 2023-11-08 NOTE — ED PROVIDER NOTES
HPI  Patient is a 43-year-old male presented to the emergency department for SI.  Well-known to this emergency department does have baseline psychiatric addition.  Stated that he attempted to slit his wrist earlier in an attempt to kill himself.  Is unable to identify an exacerbating factor.  Has tried multiple times to slit his wrist in the past the intention to kill himself.  Denies any AVH at this time.    Physical Exam  VITALS: Vital signs reviewed in nursing triage note, EMR flow sheets, and at patient's bedside  CONSTITUTIONAL: Well-appearing, in no apparent distress  HEAD: NCAT  EYES: PERRL, EOMI  NECK: Supple, non-tender, full ROM  CARD: RRR, no m/r/g, no JVD  RESP: LCTAB, speaking full sentences, no increased work of breathing, no use of accessory respiratory muscles  ABD: Bowel sounds present, non-distended, soft and non-tender, no palpable organomegaly, no masses, no guarding or rebound tenderness  BACK: No vertebral point or CVA tenderness, no obvious bony deformities, no spinal step-offs   EXT: Normal ROM in all four extremities, 2+ radial and DP pulses bilaterally, no edema  SKIN: Dry with appropriate turgor, no apparent rashes, suspicious lesions, or masses   NEURO: CNs II-XII grossly intact, AAOx4, sensation intact bilaterally, strength 5/5 on UEs and LEs bilaterally, speech is fluent and comprehensible  PSYCH: Appropriate mood and affect, no HI, does report SI, not responding to internal stimuli    Vitals:    11/08/23 0923   BP: 106/70   Pulse: 97   Resp: 16   Temp: 36.5 °C (97.7 °F)   SpO2: 99%       Assessment/Plan/MDM  Patient clinically stable with normal vital signs upon presentation to the emergency department.  Given his ongoing SI, will consult EPAT for evaluation.  Laboratory work-up obtained, as well as EKG, both of which were largely unremarkable.  Will be signed out to the incoming provider pending EPAT recommendations.  Signed out stable condition.    Results  *See section(s) entitled  ``Lab Results´´, ``Diagnostic Imaging Results Review´´ for entirety.  Notable results listed below  - EKG: TIME: 0503, I independently interpreted as normal sinus rhythm, rate of 116, all intervals normal length, normal axis, TWI appreciated in inferior leads without reciprocal changes, no STEs, no significant changes when compared to EKG on 5/31/23  - Labs: I independently interpreted as laboratory work-up unremarkable    Diagnoses as of 11/08/23 1316   Passive suicidal ideations   Homelessness      Clinical Impression  SI    Dispo  Signed out to incoming provider at 0700; please see their note for full details regarding disposition.    Patient seen and discussed with attending physician Dr. Schafer.    Rory Barrett MD  Emergency Medicine, PGY-3    Was dictated using Dragon dictation. Please excuse any errors found in the note.     Rory Barrett MD  Resident  11/08/23 7538

## 2023-11-08 NOTE — PROGRESS NOTES
I received Anibal Caro in signout from Dr. Lady Barrett. Please see the previous note for all HPI, PE and MDM up to the time of signout at 7:00 AM. In brief Anibal Caro is an 43-year-old male presenting for suicidal ideations.  Patient medically cleared. At the time of signout we were awaiting: EPAT evaluation and recommendations.  Bedside EPAT evaluation, patient well-known to psychiatry, no indication for inpatient psychiatry admission/transfer.  Safe for discharge from a psychiatric standpoint, recommended social work to see patient to provide community resources for living arrangements.  Mr. Caro otherwise is in no acute distress and has remained stable throughout ED course, safe for discharge.  Recommended following up with PCP and psychiatry, continue daily medication regimen as prescribed, strict return precautions were explained.  Patient acknowledged and amenable to plan.    Impression:  Passive suicidal ideations  Homelessness    Disposition:  Discharge  Follow-up with [facility]  to establish with a PCP  Follow-up with Psychiatry via [facility] in 1 week    Fred Plummer III, SHELBY  Emergency Medicine  [facility]       -------------------------------------------  This patient was seen by the SIMON.  I have seen and examined the patient, agree with the workup, evaluation, management and diagnosis. I reviewed and edited the above documentation where necessary.     Niels Hogan MD  EM Attending Physician

## 2023-11-08 NOTE — DISCHARGE INSTRUCTIONS
Suicide Prevention    About this topic  Suicide is a very big health concern. Suicide affects people of all ages, genders, and races.  People at risk of suicide may have a history of:  Low mood, depression, bipolar disorder, or schizophrenia  Substance abuse or previous suicide attempt  Violence in the family or physical or sexual abuse  Mental illness or suicide in the family  Access to guns or other lethal ways of harming oneself  Time in group home or [facility]  Stressful events like job loss, bullying, or death of a family member  A graphic or traumatic event like being in the  or war  Changes in brain chemicals called neurotransmitters  Others may also share these same histories but do not go on to commit suicide. A suicide attempt is a sign of an extreme problem and is much more than just looking for attention.    General  It is important to learn about the warning signs and risk factors of suicide. You can then offer help much sooner.  Warning Signs of Suicide  Suicide often happens after a stressful event in life. People who are thinking about suicide may:  Talk about suicide, a wish to die, or wanting the pain to end  Feel hopeless, helpless, worthless, or feeling like there is no way out of a situation  Feel trapped or unable to get out of a situation  Lose interest in activities or sports that they used to enjoy  Act nervous or use poor judgment  Spend more time alone. They may refuse to go to family or social activities.  Sleep too much or too little  Tell people that they may not be around anymore  Have sudden or big changes in behavior  Believe that death is the only answer to the problem    How Family and Friends Can Help Prevent Suicide  If there are warning signs:  Don't be afraid to ask if they are thinking about suicide.  Do not leave the person alone. Give more support and show that other people do care.  Pay attention to the things going on in other's lives.  It is important that family and  friends listen closely and give emotional support. Be open and do not . This makes it easier to talk about problems.  Talk with your family and friends if you notice a change in behavior. This is very important if the change happens suddenly or you do not know the reason for the change.  Be a role model. Everyone needs someone they respect when learning how to deal with family problems or hard times.  Suggest counseling or talk therapy. This can help people deal with stress, low mood, and other hard times in life. Offer to make the appointment and go with them. Reassure them they can be helped.  Look for signs of depression. Tell them if you see any signs. Early treatment with drugs and therapy may help prevent suicide. Learn the warning signs of suicide. Get help right away if your friend or family member shows warning signs of suicide.  Remove things that could be used to commit suicide. Keep all guns locked safely. Remove knives, ropes, or drugs.  If your friend or family member tries to hurt themselves, they need to be seen by a mental health provider. Get help from a professional.  Get support. Do not keep it to yourself. Take the person to an emergency room.    What to Do if You Are Thinking About Hurting or Killing Yourself  If you ever feel like you might hurt yourself or someone else, help is available:  In the [place], call or text 876 to reach the Suicide & Crisis Lifeline.  In [place], call 233-343-6875.  Call your doctor or nurse and tell them it is urgent.  Call for an ambulance (in the [place] and [place], call 821).  Go to the Emergency department at the nearest hospital.    What can be done to prevent this health problem?  There are risks for suicide. There are also things that can help protect people from suicide. Work to learn skills and behaviors like:  How to solve problems and manage conflicts  Connecting with family and friends  Getting treatment for mental health or substance  abuse problems  Cultural beliefs that discourage suicide and encourage getting help and protecting life

## 2023-11-08 NOTE — CONSULTS
"Referring Provider  Wendy Schafer    History Of Present Illness  Anibal Caro is a 43 y.o. male presenting to [facility] ED via EMS on 11/8/23 for c/c of SI.     Past Medical History  He has no past medical history on file.    Past Psychiatric History  schizophrenia    Surgical History  He has a past surgical history that includes CT angio aorta and bilateral iliofemoral runoff w and or wo IV contrast (11/3/2022).     Social History  H/o cannabis & stimulant (crack cocaine) abuse.   Current living situation: homeless, [facility]  Access to weapons:      Psychiatric History  Prior hospitalizations: multiple, per chart review, last known was at [facility] 10/16/23-unknown discharge  History of suicide attempts: per patient, cutting wrist in 2000, overdosing on pills      [facility], [facility] at [facility]  Current : [facility], [facility] at [facility]  Current outpatient treatment: patient last seen by [facility]ry at [facility] in 9/2022 per chart review     Past psychiatric medications: trazodone, Geodon, haldol YE & PO, cogentin        Allergies  Amoxicillin    Review of Systems    Psychiatric ROS - Adult  Anxiety: Negative  Depression: negative  Delirium: negative  Psychosis: negative  [place]: negative  Safety Issues: none    Physical Exam      Mental Status Exam  General: 44 y/o AA male, obese, dressed in hospital attire reclined in ED cot. NAD. Malodorous, disheveled.   Appearance: appears older than stated age  Attitude: calm, cooperative  Behavior: appropriate eye contact  Motor Activity: no PMR/PMA/TD/EPS. Gait not assessed, ED staff report steady gait  Speech: appropriate R/R/V/T. Spontaneous & fluent  Mood: \"fine\"  Affect: lower range, appropriate  Thought Process: linear, goal-directed  Thought Content: denies SI/HI or overt delusions.   Thought Perception: denies active AVH, does not appear to be RTIS  Cognition: alert, oriented x 3. Limited historian  Insight: " "limited  Judgement: questionable, resourceful.     Psychiatric Risk Assessment  Violence Risk Assessment: lower socioeconomic class, major mental illness, male, and unemployment  Acute Risk of Harm to Others is Considered: low   Suicide Risk Assessment: living alone or lack of social support, male, prior suicide attempt, and unmarried  Protective Factors against Suicide: hopefulness/future orientation  Acute Risk of Harm to Self is Considered: low    Last Recorded Vitals  Blood pressure 106/70, pulse 97, temperature 36.5 °C (97.7 °F), resp. rate 16, height 1.727 m (5' 8\"), weight 136 kg (300 lb), SpO2 99 %.    Relevant Results  Results for orders placed or performed during the hospital encounter of 11/08/23 (from the past 24 hour(s))   CBC and Auto Differential   Result Value Ref Range    WBC 7.2 4.4 - 11.3 x10*3/uL    nRBC 0.0 0.0 - 0.0 /100 WBCs    RBC 5.35 4.50 - 5.90 x10*6/uL    Hemoglobin 14.6 13.5 - 17.5 g/dL    Hematocrit 46.5 41.0 - 52.0 %    MCV 87 80 - 100 fL    MCH 27.3 26.0 - 34.0 pg    MCHC 31.4 (L) 32.0 - 36.0 g/dL    RDW 14.5 11.5 - 14.5 %    Platelets 293 150 - 450 x10*3/uL    Neutrophils % 54.1 40.0 - 80.0 %    Immature Granulocytes %, Automated 0.3 0.0 - 0.9 %    Lymphocytes % 27.8 13.0 - 44.0 %    Monocytes % 15.0 2.0 - 10.0 %    Eosinophils % 1.7 0.0 - 6.0 %    Basophils % 1.1 0.0 - 2.0 %    Neutrophils Absolute 3.87 1.20 - 7.70 x10*3/uL    Immature Granulocytes Absolute, Automated 0.02 0.00 - 0.70 x10*3/uL    Lymphocytes Absolute 1.99 1.20 - 4.80 x10*3/uL    Monocytes Absolute 1.07 (H) 0.10 - 1.00 x10*3/uL    Eosinophils Absolute 0.12 0.00 - 0.70 x10*3/uL    Basophils Absolute 0.08 0.00 - 0.10 x10*3/uL   Comprehensive Metabolic Panel   Result Value Ref Range    Glucose 103 (H) 74 - 99 mg/dL    Sodium 142 136 - 145 mmol/L    Potassium 4.7 3.5 - 5.3 mmol/L    Chloride 105 98 - 107 mmol/L    Bicarbonate 27 21 - 32 mmol/L    Anion Gap 15 10 - 20 mmol/L    Urea Nitrogen 22 6 - 23 mg/dL    Creatinine " "0.92 0.50 - 1.30 mg/dL    eGFR >90 >60 mL/min/1.73m*2    Calcium 9.5 8.6 - 10.6 mg/dL    Albumin 3.7 3.4 - 5.0 g/dL    Alkaline Phosphatase 82 33 - 120 U/L    Total Protein 6.4 6.4 - 8.2 g/dL    AST 15 9 - 39 U/L    Bilirubin, Total 0.2 0.0 - 1.2 mg/dL    ALT 19 10 - 52 U/L   Acute Toxicology Panel, Blood   Result Value Ref Range    Acetaminophen <10.0 10.0 - 30.0 ug/mL    Salicylate  <3 4 - 20 mg/dL    Alcohol <10 <=10 mg/dL     Scheduled medications  midazolam, 5 mg, intramuscular, Once  OLANZapine, 10 mg, intramuscular, Once      Continuous medications     PRN medications       Assessment/Plan     On assessment today, the pt is reclined in ED cot. He reports he is currently homeless, and stays occasionally at [address] [place], [place] [address]. He states his GH was shut down and he often sleeps on the streets. Last night, he was walking around and pressed an emergency services button. He told ED staff he was suicidal and per triage note, he reported to attempt to cut his wrists with no visible wounds. The pt now states \"I came in because I needed a place to stay. I'm not suicidal.\" He has a history of chronic AH, usually of demons, which he currently denies. The pt was most recently hospitalized at [facility] 3 weeks ago, he is unsure when he was discharged. \"The drugs I used to use make my memory foggy.\" He reports current sobriety, does not know the last time he used cannabis or crack cocaine. No UDS results available at time of assessment. The pt reports he received his haldol decanoate injection while at [facility] 3 weeks ago & verbalizes community resources to re-establish psychiatric care.     The pt does not appear as an elevated risk of harm to himself or others. He presents at his psychiatric baseline & with social needs today, rather than a decompensation of his schizophrenia & does not meet criteria for inpatient psychiatric hospitalization.    Impression  Schizophrenia, by history (no acute " decompensation)    Recommendations  Following a chart review, safety risk assessment, interview with pt and ED staff, pt does not meet criteria for involuntary inpatient psychiatric hospitalization at this time. I am not recommending any medication management changes. Please encourage pt to follow-up with outpatient provider.   -Appreciate ED SW resources    I discussed these recommendations with the current ED provider (Cj Plummer CNP) who was in agreement with above plan of care.      I spent 60 minutes in the professional and overall care of this patient.          Medication Consent  Medication Consent: n/a; consult service    ELVIA Grady-SHELBY

## 2023-11-08 NOTE — ED TRIAGE NOTES
"Pt brought in by EMS with c/o SI.  Pt was looking through garbage and stated he found a knife and cut self.  EMS and PD did not find knife, pt has no cuts on him.  When asked pt why he is here he stated as he pointed to his left wrist \"I cut myself\", fully healed scar noted to left wrist but no new cuts, scabs or bld noted.  Pt refusing to state where he lives.  Pt denied answering any further questions.  Pt changed into hospital gown and all belongings removed from room.  Garage door lowered for pts safety.  Video monitoring notified to monitor pt  "

## 2023-11-15 ENCOUNTER — HOSPITAL ENCOUNTER (EMERGENCY)
Facility: HOSPITAL | Age: 43
End: 2023-11-15
Payer: COMMERCIAL

## 2023-11-15 ENCOUNTER — HOSPITAL ENCOUNTER (EMERGENCY)
Facility: HOSPITAL | Age: 43
Discharge: LEFT BEFORE DISCHARGE | End: 2023-11-15
Payer: COMMERCIAL

## 2023-11-15 VITALS
TEMPERATURE: 98.4 F | OXYGEN SATURATION: 98 % | HEART RATE: 64 BPM | DIASTOLIC BLOOD PRESSURE: 85 MMHG | SYSTOLIC BLOOD PRESSURE: 144 MMHG | RESPIRATION RATE: 18 BRPM

## 2023-11-15 PROCEDURE — 99283 EMERGENCY DEPT VISIT LOW MDM: CPT

## 2023-11-15 PROCEDURE — 4500999001 HC ED NO CHARGE

## 2023-11-19 ENCOUNTER — HOSPITAL ENCOUNTER (EMERGENCY)
Facility: HOSPITAL | Age: 43
Discharge: HOME | End: 2023-11-19
Attending: EMERGENCY MEDICINE
Payer: COMMERCIAL

## 2023-11-19 VITALS
HEART RATE: 86 BPM | SYSTOLIC BLOOD PRESSURE: 140 MMHG | HEIGHT: 61 IN | RESPIRATION RATE: 16 BRPM | BODY MASS INDEX: 59.47 KG/M2 | WEIGHT: 315 LBS | OXYGEN SATURATION: 99 % | DIASTOLIC BLOOD PRESSURE: 104 MMHG | TEMPERATURE: 97.5 F

## 2023-11-19 DIAGNOSIS — F20.9 SCHIZOPHRENIA, UNSPECIFIED TYPE (MULTI): Primary | ICD-10-CM

## 2023-11-19 PROCEDURE — 99283 EMERGENCY DEPT VISIT LOW MDM: CPT

## 2023-11-19 PROCEDURE — 2500000001 HC RX 250 WO HCPCS SELF ADMINISTERED DRUGS (ALT 637 FOR MEDICARE OP): Performed by: STUDENT IN AN ORGANIZED HEALTH CARE EDUCATION/TRAINING PROGRAM

## 2023-11-19 PROCEDURE — 99285 EMERGENCY DEPT VISIT HI MDM: CPT | Performed by: EMERGENCY MEDICINE

## 2023-11-19 PROCEDURE — 99282 EMERGENCY DEPT VISIT SF MDM: CPT

## 2023-11-19 PROCEDURE — 99283 EMERGENCY DEPT VISIT LOW MDM: CPT | Performed by: EMERGENCY MEDICINE

## 2023-11-19 RX ORDER — ACETAMINOPHEN 325 MG/1
650 TABLET ORAL ONCE
Status: COMPLETED | OUTPATIENT
Start: 2023-11-19 | End: 2023-11-19

## 2023-11-19 RX ORDER — ACETAMINOPHEN 325 MG/1
650 TABLET ORAL EVERY 6 HOURS PRN
Qty: 30 TABLET | Refills: 0 | OUTPATIENT
Start: 2023-11-19 | End: 2024-02-10

## 2023-11-19 RX ORDER — IBUPROFEN 600 MG/1
600 TABLET ORAL EVERY 8 HOURS PRN
Qty: 30 TABLET | Refills: 0 | OUTPATIENT
Start: 2023-11-19 | End: 2023-12-12

## 2023-11-19 RX ORDER — IBUPROFEN 600 MG/1
600 TABLET ORAL ONCE
Status: COMPLETED | OUTPATIENT
Start: 2023-11-19 | End: 2023-11-19

## 2023-11-19 RX ADMIN — ACETAMINOPHEN 650 MG: 325 TABLET ORAL at 19:19

## 2023-11-19 RX ADMIN — IBUPROFEN 600 MG: 600 TABLET, FILM COATED ORAL at 19:19

## 2023-11-19 ASSESSMENT — LIFESTYLE VARIABLES
EVER HAD A DRINK FIRST THING IN THE MORNING TO STEADY YOUR NERVES TO GET RID OF A HANGOVER: NO
HAVE PEOPLE ANNOYED YOU BY CRITICIZING YOUR DRINKING: NO
HAVE YOU EVER FELT YOU SHOULD CUT DOWN ON YOUR DRINKING: NO
EVER FELT BAD OR GUILTY ABOUT YOUR DRINKING: NO
REASON UNABLE TO ASSESS: NO

## 2023-11-19 ASSESSMENT — PAIN SCALES - GENERAL: PAINLEVEL_OUTOF10: 5 - MODERATE PAIN

## 2023-11-19 ASSESSMENT — PAIN DESCRIPTION - ORIENTATION: ORIENTATION: LEFT

## 2023-11-19 ASSESSMENT — PAIN DESCRIPTION - FREQUENCY: FREQUENCY: CONSTANT/CONTINUOUS

## 2023-11-19 ASSESSMENT — PAIN - FUNCTIONAL ASSESSMENT: PAIN_FUNCTIONAL_ASSESSMENT: 0-10

## 2023-11-19 ASSESSMENT — PAIN DESCRIPTION - LOCATION: LOCATION: WRIST

## 2023-11-19 NOTE — ED TRIAGE NOTES
Pt presents to the ed for chronic l wrist pain since self inflicted laceration in 2000.  Pt has a history of schizophrenia.  Pt denies si/hi/ah/vh.   Pt asking for pain medications and food

## 2023-11-19 NOTE — ED NOTES
Assumed  care of pt.  Pt ambulatory to bed 17 via [facility].  Pt presents to the ed for chronic l wrist pain since self inflicted laceration in 2000.  Pt has a history of schizophrenia.  Pt denies si/hi/ah/vh.   Pt asking for pain medications and food    Pt is awake, alert and oriented x 3.  Pt is calm and cooperative.  Pt appears to be internally stimulated.  Pt speaking with himself and will occasionally yell out.  Pt appears to be unkept, and dirty.  Pt has strong odor.  Skin is appropriate for race, warm and dry.  Respirations are unlabored.  Lung sounds are clear.  Pt has noted deep cough.  Abdomen is soft, non tender and non distended.  Pulses are palpable and strong in all extremities.  No edema noted.  No obvious signs of trauma noted     Purnima Dickens RN  11/19/23 7042

## 2023-11-20 NOTE — ED PROVIDER NOTES
"HPI   Chief Complaint   Patient presents with    Wrist Pain       Patient is miguel is a 43-year-old male with past medical history of schizophrenia noncompliant with YE here with wrist pain in the setting of prior self laceration sustained in 2000.  Patient has been seen in multiple emergency departments over the past few days and weeks for similar concerns, was treated with analgesia with improvement, discharged.  Patient states he currently lives in a house with \"other tenants\", is organized in his speech but is clearly internally stimulated.  Patient states that he has not received his YE in \"many months\" because \"I do not know I just have not gone to the clinic\".  Patient states he has followed with [facility] in [facility] past, but does not currently have a psychiatrist or therapist.  He denies any SI, HI, AVH.  Denies any drug use.      History provided by:  EMS personnel  History limited by:  Psychiatric disorder   used: No                        No data recorded                Patient History   No past medical history on file.  Past Surgical History:   Procedure Laterality Date    CT AORTA AND BILATERAL ILIOFEMORAL RUNOFF ANGIOGRAM W AND/OR WO IV CONTRAST  11/3/2022    CT AORTA AND BILATERAL ILIOFEMORAL RUNOFF ANGIOGRAM W AND/OR WO IV CONTRAST 11/3/2022 [facility] [place] [facility]ERGENCY [facility]GACY     No family history on file.  Social History     Tobacco Use    Smoking status: Never    Smokeless tobacco: Never   Vaping Use    Vaping Use: Never used   Substance Use Topics    Alcohol use: Never    Drug use: Not on file       Physical Exam   ED Triage Vitals [11/19/23 1834]   Temp Heart Rate Resp BP   36.4 °C (97.5 °F) 86 16 (!) 140/104      SpO2 Temp Source Heart Rate Source Patient Position   99 % Temporal Monitor Sitting      BP Location FiO2 (%)     Left arm --       Physical Exam  Constitutional:       Appearance: Normal appearance.   HENT:      Head: Normocephalic and atraumatic.      Right Ear: External ear normal. "      Left Ear: External ear normal.      Nose: Nose normal.      Mouth/Throat:      Mouth: Mucous membranes are moist.      Pharynx: Oropharynx is clear.   Eyes:      Extraocular Movements: Extraocular movements intact.      Conjunctiva/sclera: Conjunctivae normal.      Pupils: Pupils are equal, round, and reactive to light.   Cardiovascular:      Rate and Rhythm: Normal rate and regular rhythm.   Pulmonary:      Effort: Pulmonary effort is normal.      Breath sounds: Normal breath sounds.   Abdominal:      Palpations: Abdomen is soft.      Tenderness: There is no abdominal tenderness.   Musculoskeletal:         General: Normal range of motion.      Cervical back: Normal range of motion and neck supple.   Skin:     General: Skin is warm and dry.      Capillary Refill: Capillary refill takes less than 2 seconds.   Neurological:      General: No focal deficit present.      Mental Status: He is alert and oriented to person, place, and time.   Psychiatric:         Mood and Affect: Mood normal.         Behavior: Behavior normal.         ED Course & MDM   Diagnoses as of 11/19/23 6050   Schizophrenia, unspecified type (CMS/HCC)       Medical Decision Making  43-year-old male with history of schizophrenia noncompliant with YE here with wrist pain.  Patient presents hemodynamically stable, no acute distress, internally stimulated but otherwise organized mentating appropriately, afebrile saturating on room air.  Physical exam notable for a disheveled but nontoxic-appearing male, largely benign physical exam, area of wrist that is causing him pain without signs of acute trauma, exam neurovascular intact, no concern for infection.  The scar appears old.  Patient given Tylenol, ibuprofen with improvement.  While the patient is responding to internal stimuli, he has intact insight and judgment, is organized and linear, seems to understand that he needs follow-up with outpatient psychiatry, I do not believe that his evaluation  requires acute crisis stabilization for inpatient management.  Patient has a safe disposition, no signs of toxidrome, discharged with follow-up and return precautions    Amount and/or Complexity of Data Reviewed  External Data Reviewed: notes.        Procedure  Procedures     Jose Gipson MD  Resident  11/19/23 2723

## 2023-11-20 NOTE — DISCHARGE INSTRUCTIONS
You were seen for wrist pain and schizophrenia.  I believe in addition to Tylenol and ibuprofen for the chronic wrist pain, you would benefit from seeing a psychiatrist and a therapist as an outpatient.  I have provided you resources to follow-up, as well as a referral to a psychiatrist who should be in touch regarding an appointment.  I believe you would benefit from medications that would help you with the internal voices, and continuing to work with behavioral health professionals.

## 2023-12-12 ENCOUNTER — HOSPITAL ENCOUNTER (EMERGENCY)
Facility: HOSPITAL | Age: 43
Discharge: HOME | End: 2023-12-12
Payer: COMMERCIAL

## 2023-12-12 ENCOUNTER — APPOINTMENT (OUTPATIENT)
Dept: RADIOLOGY | Facility: HOSPITAL | Age: 43
End: 2023-12-12
Payer: COMMERCIAL

## 2023-12-12 VITALS
SYSTOLIC BLOOD PRESSURE: 150 MMHG | HEART RATE: 79 BPM | TEMPERATURE: 99 F | OXYGEN SATURATION: 96 % | DIASTOLIC BLOOD PRESSURE: 94 MMHG | RESPIRATION RATE: 18 BRPM

## 2023-12-12 DIAGNOSIS — G89.29 CHRONIC PAIN OF LEFT ANKLE: Primary | ICD-10-CM

## 2023-12-12 DIAGNOSIS — M25.572 CHRONIC PAIN OF LEFT ANKLE: Primary | ICD-10-CM

## 2023-12-12 PROCEDURE — 99283 EMERGENCY DEPT VISIT LOW MDM: CPT | Mod: 25

## 2023-12-12 PROCEDURE — 73610 X-RAY EXAM OF ANKLE: CPT | Mod: LT

## 2023-12-12 PROCEDURE — 99283 EMERGENCY DEPT VISIT LOW MDM: CPT | Performed by: PHYSICIAN ASSISTANT

## 2023-12-12 PROCEDURE — 73610 X-RAY EXAM OF ANKLE: CPT | Mod: LEFT SIDE | Performed by: RADIOLOGY

## 2023-12-12 PROCEDURE — 2500000001 HC RX 250 WO HCPCS SELF ADMINISTERED DRUGS (ALT 637 FOR MEDICARE OP): Mod: SE

## 2023-12-12 RX ORDER — ACETAMINOPHEN 325 MG/1
975 TABLET ORAL ONCE
Status: COMPLETED | OUTPATIENT
Start: 2023-12-12 | End: 2023-12-12

## 2023-12-12 RX ORDER — NAPROXEN 500 MG/1
TABLET ORAL
Status: COMPLETED
Start: 2023-12-12 | End: 2023-12-12

## 2023-12-12 RX ORDER — NAPROXEN 500 MG/1
500 TABLET ORAL ONCE
Status: COMPLETED | OUTPATIENT
Start: 2023-12-12 | End: 2023-12-12

## 2023-12-12 RX ORDER — IBUPROFEN 600 MG/1
600 TABLET ORAL EVERY 6 HOURS PRN
Qty: 14 TABLET | Refills: 0 | Status: SHIPPED | OUTPATIENT
Start: 2023-12-12 | End: 2023-12-19

## 2023-12-12 RX ADMIN — ACETAMINOPHEN 975 MG: 325 TABLET ORAL at 16:49

## 2023-12-12 RX ADMIN — NAPROXEN 500 MG: 500 TABLET ORAL at 16:49

## 2023-12-12 NOTE — ED PROVIDER NOTES
"HPI   Chief Complaint   Patient presents with    Ankle Pain       HPI: Patient is a 43-year-old male with history of hypertension, homelessness, schizophrenia who presents to the ED for left ankle pain that started today.  Patient denies any trauma or injury that led to his pain.  States that his pain is \"worse than normal\" but is unable to say why he normally has pain in this ankle.  He has not taken anything for it prior to arrival.  Currently rates his pain a 10 out of 10 in severity.  Denies any numbness, tingling.  ------------------------------------------------------------------------------------------------------------------------------------------  ROS: a ten point review of systems was performed and was negative except as per HPI.  ------------------------------------------------------------------------------------------------------------------------------------------  PMH / PSH: as per HPI, otherwise reviewed   MEDS: as per HPI, otherwise reviewed in EMR  ALLERGIES: as per HPI, otherwise reviewed in EMR  SocH:  as per HPI, otherwise reviewed in EMR  FH:  as per HPI, otherwise reviewed in EMR   ------------------------------------------------------------------------------------------------------------------------------------------  Physical Exam:  VS: As documented in the triage note and EMR flowsheet from this visit was reviewed  General: Well appearing. No acute distress.   Eyes:  Extraocular movements grossly intact. No scleral icterus.   Head: Atraumatic. Normocephalic.     Neck: No meningismus. No gross masses. Full movement through range of motion  CV: Regular rhythm. No murmurs, rubs, gallops appreciated.   Resp: Clear to auscultation bilaterally. No respiratory distress.    MSK: Symmetric muscle bulk. No gross step offs or deformities.  No deformity or edema to the left ankle.  No external signs of trauma.  Distal sensation and 2+ pulses.  Skin: Warm, dry. No rashes  Neuro: CN II-VII intact. A&O " x3. Speech fluent. Alert. Moving all extremities. Ambulates with normal gait  Psych: Appropriate mood and affect for situation  ------------------------------------------------------------------------------------------------------------------------------------------  Hospital Course / Medical Decision Making: Patient is a 43-year-old male presents to the ED for atraumatic left ankle pain.  On examination, patient is well-appearing.  Vitals are stable.  Musculoskeletal examination reveals no obvious deformity or edema to the left ankle.  No significant tenderness to palpation.  Patient administered naproxen and Tylenol for pain control.  X-ray obtained and showed degenerative and remote posttraumatic changes about the ankle which is similar to prior studies.  No acute findings.  On reassessment, patient feeling improved.  He is advised on rest, ice, elevation and NSAID use.  Provided a prescription for ibuprofen. Patient has remained hemodynamically stable throughout the course of their ED stay.  Patient is home-going.  Patient advised to return to the ED for any worsening symptoms.  Advised to follow-up with PCP.  Patient was discharged in stable condition.                              No data recorded                Patient History   No past medical history on file.  Past Surgical History:   Procedure Laterality Date    CT AORTA AND BILATERAL ILIOFEMORAL RUNOFF ANGIOGRAM W AND/OR WO IV CONTRAST  11/3/2022    CT AORTA AND BILATERAL ILIOFEMORAL RUNOFF ANGIOGRAM W AND/OR WO IV CONTRAST [place] [facility] [place]     No family history on file.  Social History     Tobacco Use    Smoking status: Never    Smokeless tobacco: Never   Vaping Use    Vaping Use: Never used   Substance Use Topics    Alcohol use: Never    Drug use: Not on file       Physical Exam   ED Triage Vitals [12/12/23 1605]   Temp Heart Rate Resp BP   37.2 °C (99 °F) 79 18 (!) 150/94      SpO2 Temp src Heart Rate Source Patient Position   96 % -- --  --      BP Location FiO2 (%)     -- --       Physical Exam    ED Course & MDM   Diagnoses as of 12/12/23 1741   Chronic pain of left ankle       Medical Decision Making      Procedure  Procedures     Dulce Motta PA-C  12/12/23 1742

## 2023-12-12 NOTE — ED TRIAGE NOTES
Atraumatic left ankle pain. Pt is ambulatory in and out of dept,and scooting in WC. Pt has mental health hx and is speaking to people who aren't present. Laughing inappropriately but is calm an cooperative. Denies SI/HI/AH/VH.

## 2023-12-13 LAB — HOLD SPECIMEN: NORMAL

## 2023-12-18 ENCOUNTER — HOSPITAL ENCOUNTER (EMERGENCY)
Facility: HOSPITAL | Age: 43
Discharge: HOME | End: 2023-12-18
Payer: COMMERCIAL

## 2023-12-18 ENCOUNTER — HOSPITAL ENCOUNTER (EMERGENCY)
Facility: HOSPITAL | Age: 43
Discharge: HOME | End: 2023-12-18
Attending: EMERGENCY MEDICINE
Payer: COMMERCIAL

## 2023-12-18 VITALS
OXYGEN SATURATION: 96 % | HEIGHT: 68 IN | BODY MASS INDEX: 45.47 KG/M2 | DIASTOLIC BLOOD PRESSURE: 94 MMHG | SYSTOLIC BLOOD PRESSURE: 133 MMHG | WEIGHT: 300 LBS | RESPIRATION RATE: 18 BRPM | TEMPERATURE: 97 F | HEART RATE: 89 BPM

## 2023-12-18 VITALS
DIASTOLIC BLOOD PRESSURE: 103 MMHG | RESPIRATION RATE: 20 BRPM | HEART RATE: 95 BPM | OXYGEN SATURATION: 94 % | TEMPERATURE: 98.8 F | SYSTOLIC BLOOD PRESSURE: 146 MMHG

## 2023-12-18 DIAGNOSIS — F20.9 SCHIZOPHRENIA, UNSPECIFIED TYPE (MULTI): Primary | ICD-10-CM

## 2023-12-18 DIAGNOSIS — M54.50 CHRONIC LEFT-SIDED LOW BACK PAIN WITHOUT SCIATICA: Primary | ICD-10-CM

## 2023-12-18 DIAGNOSIS — G89.29 CHRONIC LEFT-SIDED LOW BACK PAIN WITHOUT SCIATICA: Primary | ICD-10-CM

## 2023-12-18 PROCEDURE — 2500000001 HC RX 250 WO HCPCS SELF ADMINISTERED DRUGS (ALT 637 FOR MEDICARE OP): Performed by: STUDENT IN AN ORGANIZED HEALTH CARE EDUCATION/TRAINING PROGRAM

## 2023-12-18 PROCEDURE — 99284 EMERGENCY DEPT VISIT MOD MDM: CPT

## 2023-12-18 PROCEDURE — 2500000005 HC RX 250 GENERAL PHARMACY W/O HCPCS

## 2023-12-18 PROCEDURE — 99283 EMERGENCY DEPT VISIT LOW MDM: CPT

## 2023-12-18 PROCEDURE — 99283 EMERGENCY DEPT VISIT LOW MDM: CPT | Performed by: EMERGENCY MEDICINE

## 2023-12-18 PROCEDURE — 99282 EMERGENCY DEPT VISIT SF MDM: CPT

## 2023-12-18 RX ORDER — BENZTROPINE MESYLATE 1 MG/1
1 TABLET ORAL NIGHTLY
Qty: 30 TABLET | Refills: 0 | Status: SHIPPED | OUTPATIENT
Start: 2023-12-18 | End: 2024-01-17

## 2023-12-18 RX ORDER — LIDOCAINE 560 MG/1
1 PATCH PERCUTANEOUS; TOPICAL; TRANSDERMAL ONCE
Status: DISCONTINUED | OUTPATIENT
Start: 2023-12-18 | End: 2023-12-18 | Stop reason: HOSPADM

## 2023-12-18 RX ORDER — ACETAMINOPHEN 325 MG/1
975 TABLET ORAL ONCE
Status: COMPLETED | OUTPATIENT
Start: 2023-12-18 | End: 2023-12-18

## 2023-12-18 RX ORDER — BENZTROPINE MESYLATE 1 MG/1
1 TABLET ORAL ONCE
Status: COMPLETED | OUTPATIENT
Start: 2023-12-18 | End: 2023-12-18

## 2023-12-18 RX ADMIN — ACETAMINOPHEN 975 MG: 325 TABLET ORAL at 19:03

## 2023-12-18 RX ADMIN — BENZTROPINE MESYLATE 1 MG: 1 TABLET ORAL at 02:26

## 2023-12-18 RX ADMIN — LIDOCAINE 1 PATCH: 4 PATCH TOPICAL at 19:03

## 2023-12-18 ASSESSMENT — LIFESTYLE VARIABLES
HAVE YOU EVER FELT YOU SHOULD CUT DOWN ON YOUR DRINKING: NO
REASON UNABLE TO ASSESS: NO
EVER FELT BAD OR GUILTY ABOUT YOUR DRINKING: NO
HAVE PEOPLE ANNOYED YOU BY CRITICIZING YOUR DRINKING: NO
EVER HAD A DRINK FIRST THING IN THE MORNING TO STEADY YOUR NERVES TO GET RID OF A HANGOVER: NO

## 2023-12-18 ASSESSMENT — PAIN DESCRIPTION - LOCATION: LOCATION: BACK

## 2023-12-18 ASSESSMENT — COLUMBIA-SUICIDE SEVERITY RATING SCALE - C-SSRS
6. HAVE YOU EVER DONE ANYTHING, STARTED TO DO ANYTHING, OR PREPARED TO DO ANYTHING TO END YOUR LIFE?: NO
2. HAVE YOU ACTUALLY HAD ANY THOUGHTS OF KILLING YOURSELF?: NO
6. HAVE YOU EVER DONE ANYTHING, STARTED TO DO ANYTHING, OR PREPARED TO DO ANYTHING TO END YOUR LIFE?: NO
2. HAVE YOU ACTUALLY HAD ANY THOUGHTS OF KILLING YOURSELF?: NO
6. HAVE YOU EVER DONE ANYTHING, STARTED TO DO ANYTHING, OR PREPARED TO DO ANYTHING TO END YOUR LIFE?: YES
6. HAVE YOU EVER DONE ANYTHING, STARTED TO DO ANYTHING, OR PREPARED TO DO ANYTHING TO END YOUR LIFE?: NO
1. IN THE PAST MONTH, HAVE YOU WISHED YOU WERE DEAD OR WISHED YOU COULD GO TO SLEEP AND NOT WAKE UP?: NO
1. IN THE PAST MONTH, HAVE YOU WISHED YOU WERE DEAD OR WISHED YOU COULD GO TO SLEEP AND NOT WAKE UP?: NO

## 2023-12-18 ASSESSMENT — PAIN - FUNCTIONAL ASSESSMENT: PAIN_FUNCTIONAL_ASSESSMENT: 0-10

## 2023-12-18 ASSESSMENT — PAIN SCALES - GENERAL: PAINLEVEL_OUTOF10: 6

## 2023-12-18 NOTE — DISCHARGE INSTRUCTIONS
May take Tylenol for discomfort.  May apply cool/warm compresses to the area  Follow-up with primary care provider as needed or the listed provider as needed.

## 2023-12-18 NOTE — ED PROVIDER NOTES
History of Present Illness   CC: Med Refill (Pt states he is out of his cogentin and needs a refill on his prescription)     History provided by: Patient  Limitations to History: None    HPI:  Anibal Caro is a 43 y.o. male with history of schizophrenia presenting to the emergency department after being out of his Cogentin.  Unsure when he last took it.  States he does her primary care doctor, but does not follow-up regularly.  She denies any acute medical complaints.  Endorses mild chronic pain.  Denies SI, HI.  Endorses occasional auditory hallucination denies any voices talking to him or any command auditory hallucinations.  Denies any visual hallucinations.      External Records Reviewed: Reviewed home meds, is on Cogentin 1 mg p.o. daily    Physical Exam   Triage vitals:  T 37.1 °C (98.8 °F)  HR 95  BP (!) 146/103  RR 20  O2 94 % None (Room air)    Vital signs reviewed in nursing triage note, EMR flow sheets, and at patient's bedside.   General: Awake, alert, malkempt, malodorous  CV: Regular rate, Regular rhythm. Radial pulses 2+ bilaterally  Resp: Breathing non-labored, speaking in full sentences.  Clear to auscultation bilaterally  GI: Soft, obese, non-tender. No rebound or guarding.  MSK/Extremities: No gross bony deformities. Moving all extremities  Skin: Warm. Appropriate color  Psych: Malodorous, malkempt.  Denies SI, HI.  Has some occasional loud outbursts, but is otherwise linear, no flight of ideas or tangential thought processes.  States he hears a voice shouting every now and then.  Denies any command hallucinations.  Denies any visual hallucinations.  States his hallucinations are chronic.    ED Course & Medical Decision Making   ED Course:  Diagnoses as of 12/18/23 4357   Schizophrenia, unspecified type (CMS/[facility])       Differential diagnoses considered include but are not limited to: Schizophrenia, akathisia, tardive dyskinesia    Social Determinants Limiting Care: Difficulty obtaining  outpatient follow-up, Difficulty paying for/obtaining medications, and Mental health issues    MDM:  43 y.o. male presenting to the emergency department with request for med refill.  On arrival vital signs within normal limits.  Exam unremarkable, at baseline.  Will refill Cogentin.  Patient denies SI, HI.  Does not appear to be decompensated from a psychiatric standpoint.  Will send prescription to [facility] pharmacy as he requests.  Offered patient Tylenol in the ED for his chronic pain which she declined.  Patient able to tolerate p.o.  Discharged in stable condition.    Disposition   As a result of the work-up, patient was discharged home.  They were informed of their diagnosis and instructed to come back with any concerns or worsening of condition and was agreeable to the plan as discussed above.  The patient was given the opportunity to ask questions.  All of the patient's questions were answered.  The patient remained stable under my care.    Procedures   Procedures    Patient seen and discussed with ED attending physician.    Anshu Lau MD  PGY 3 Emergency Medicine         Joseph Lau MD  Resident  12/18/23 5231

## 2023-12-18 NOTE — DISCHARGE INSTRUCTIONS
Please take all of your prescribed medications, take your Cogentin daily as prescribed.  Please follow-up with your primary care doctor.  If you do not have one, call the number above.  Return to the ED with any worsening hallucinations, or any thoughts when to harm yourself or anyone else.

## 2023-12-18 NOTE — ED PROVIDER NOTES
Emergency Department Encounter  [facility] EMERGENCY MEDICINE    Patient: Anibal Caro  MRN: [id number]  : 1980  Date of Evaluation: 2023  ED Provider: RIGOBERTO Friedman      Chief Complaint       Chief Complaint   Patient presents with    Back Pain     [place]    (Location/Symptom, Timing/Onset, Context/Setting, Quality, Duration, Modifying Factors, Severity) Note limiting factors.   Limitations to History: None  Historian: Patient  Records reviewed: EMR inpatient and outpatient notes, Care Everywhere      Anibal Caro is a 43 y.o. male with past medical history of schizophrenia who presents to the emergency department complaining of lower back pain.  He reports having intermittent back pain over the last year.  He states today felt sharp pain that has resolved.  He denies acute injury, changes in bowel or bladder, saddle anesthesia, inability to ambulate, deformity.  He denies HI/SI.    ROS:     Review of Systems  14 systems reviewed and otherwise acutely negative except as in the [place].          Past History   History reviewed. No pertinent past medical history.  Past Surgical History:   Procedure Laterality Date    CT AORTA AND BILATERAL ILIOFEMORAL RUNOFF ANGIOGRAM W AND/OR WO IV CONTRAST  11/3/2022    CT AORTA AND BILATERAL ILIOFEMORAL RUNOFF ANGIOGRAM W AND/OR WO IV CONTRAST 11[place]3/2022 [facility] [place]     Social History     Socioeconomic History    Marital status: Single     Spouse name: None    Number of children: None    Years of education: None    Highest education level: None   Occupational History    None   Tobacco Use    Smoking status: Never    Smokeless tobacco: Never   Vaping Use    Vaping Use: Never used   Substance and Sexual Activity    Alcohol use: Never    Drug use: None    Sexual activity: None   Other Topics Concern    None   Social History Narrative    None     Social Determinants of Health     Financial Resource Strain: Not on file   Food Insecurity: Not  on file   Transportation Needs: Not on file   Physical Activity: Not on file   Stress: Not on file   Social Connections: Not on file   Intimate Partner Violence: Not on file   Housing Stability: Not on file       Medications/Allergies     Previous Medications    ACETAMINOPHEN (TYLENOL) 325 MG TABLET    Take 2 tablets (650 mg) by mouth every 6 hours if needed for mild pain (1 - 3) or fever (temp greater than 38.0 C).    ALBUTEROL 90 MCG/ACTUATION INHALER    Inhale every 4 hours if needed.    AMLODIPINE (NORVASC) 5 MG TABLET    Take 1 tablet (5 mg) by mouth once daily.    BENZTROPINE (COGENTIN) 1 MG TABLET    Take 1 tablet (1 mg) by mouth once daily at bedtime.    CHOLECALCIFEROL (VITAMIN D-3) 5,000 UNITS TABLET    Take 1 tablet (125 mcg) by mouth once daily.    DIVALPROEX (DEPAKOTE) 500 MG EC TABLET    Take 1 tablet (500 mg) by mouth twice a day.    ERGOCALCIFEROL (VITAMIN D-2) 1.25 MG (49201 UT) CAPSULE    Take 1 capsule (50,000 Units) by mouth.    GABAPENTIN (NEURONTIN) 300 MG CAPSULE    Take by mouth.    HYDROXYZINE HCL (ATARAX) 50 MG TABLET    Take by mouth 2 times a day as needed.    IBUPROFEN 600 MG TABLET    Take 1 tablet (600 mg) by mouth every 6 hours if needed for mild pain (1 - 3) for up to 7 days.    NALTREXONE ER (VIVITROL) INJECTION    Inject 4 mL (380 mg) into the muscle.    RISPERIDONE (RISPERDAL) 2 MG TABLET    Take 1.5 tablets (3 mg) by mouth.    TRAZODONE (DESYREL) 150 MG TABLET    Take by mouth.     Allergies   Allergen Reactions    Amoxicillin Unknown     GI upset        Physical Exam       ED Triage Vitals [12/18/23 1747]   Temp Heart Rate Resp BP   36.1 °C (97 °F) 89 18 (!) 133/94      SpO2 Temp Source Heart Rate Source Patient Position   96 % Temporal Monitor --      BP Location FiO2 (%)     -- --         Physical Exam    GENERAL:  The patient appears nourished and normally developed. Vital signs as documented.     HEENT:  Head normocephalic, atraumatic, EOMs intact, PERRLA, Mucous membranes  moist. Nares patent without copious rhinorrhea.  No lymphadenopathy.    PULMONARY:  Lungs are clear to auscultation, without any respiratory distress. Able to speak full sentences, no accessory muscle use.    Neck: Supple, without meningismus.  No cervical midline or paraspinal tenderness.  No deformities or step-offs.    CARDIAC:   Normal rate and rhythm. No murmurs, rubs or gallops.    ABDOMEN:  Soft, non distended, non tender, BS positive x 4 quadrants, No rebound or guarding, no peritoneal signs, no CVA tenderness, no masses or organomegaly.    : Deferred    MUSCULOSKELETAL:   No lumbar midline tenderness.  Has left-sided paraspinal tenderness.  No deformities or step-offs.  Able to ambulate, Non edematous, with no obvious deformities. Pulses intact distal.    SKIN:   Good color, with no significant rashes.  No pallor.    NEURO:  No obvious neurological deficits, normal sensation and strength bilaterally.  Able to follow commands, NIH 0, CN 2-12 intact.        Diagnostics   Labs:  Labs Reviewed - No data to display  Radiographs:  No orders to display       Procedures: N/A      EKG: interpreted by this provider  Time:  Indication:  Rate:  Rhythm:  Axis:  Interval:  ST Segment:  Comparison to Prior:    Medical decision making   In brief, Anibal Caro is a 43 y.o. male who presented to the emergency department lower back pain.  Patient is very familiar with the emergency department, he has been evaluated over the last few days. See history above. Vitals reviewed.  Patient appears in no acute distress and is nontoxic-appearing.  Physical exam unremarkable.  Low suspicion for fracture, subluxation, herniated nucleus pulposus, epidural abscess/hematoma, vertebral osteomyelitis, discitis, cauda equina syndrome, metastatic disease, AAA and retroperitoneal hemorrhage due to patient's history and physical exam.  Administered Tylenol and lidocaine patch.  I discussed the differential, results and discharge plan with the  "patient. I emphasized the importance of follow-up with the physician I referred them to in the timeframe recommended. I explained reasons for the patient to return to the clinic. Questions were addressed. They understand return precautions and discharge instructions. The patient expressed understanding.      Diagnoses as of 12/18/23 1852   Chronic left-sided low back pain without sciatica      Visit Vitals  BP (!) 133/94   Pulse 89   Temp 36.1 °C (97 °F) (Temporal)   Resp 18   Ht 1.727 m (5' 8\")   Wt 136 kg (300 lb)   SpO2 96%   BMI 45.61 kg/m²   Smoking Status Never   BSA 2.55 m²       Medications   acetaminophen (Tylenol) tablet 975 mg (has no administration in time range)         Final Impression    Chronic left-sided low back pain without sciatica    DISPOSITION  Disposition: Discharge  Patient condition is: Stable    Comment: Please note this report has been produced using speech recognition software and may contain errors related to that system including errors in grammar, punctuation, and spelling, as well as words and phrases that may be inappropriate.  If there are any questions or concerns please feel free to contact the dictating provider for clarification.    RIGOBERTO Friedman  [facility]  Emergency department     RIGOBERTO Friedman  12/18/23 1906    "

## 2023-12-30 ENCOUNTER — HOSPITAL ENCOUNTER (EMERGENCY)
Facility: HOSPITAL | Age: 43
Discharge: HOME | End: 2023-12-30
Attending: EMERGENCY MEDICINE
Payer: COMMERCIAL

## 2023-12-30 VITALS
HEART RATE: 73 BPM | TEMPERATURE: 98.2 F | HEIGHT: 68 IN | OXYGEN SATURATION: 97 % | SYSTOLIC BLOOD PRESSURE: 124 MMHG | DIASTOLIC BLOOD PRESSURE: 79 MMHG | RESPIRATION RATE: 16 BRPM | WEIGHT: 300 LBS | BODY MASS INDEX: 45.47 KG/M2

## 2023-12-30 DIAGNOSIS — M25.572 LEFT ANKLE PAIN, UNSPECIFIED CHRONICITY: Primary | ICD-10-CM

## 2023-12-30 DIAGNOSIS — M25.532 LEFT WRIST PAIN: ICD-10-CM

## 2023-12-30 PROCEDURE — 99282 EMERGENCY DEPT VISIT SF MDM: CPT

## 2023-12-30 PROCEDURE — 99283 EMERGENCY DEPT VISIT LOW MDM: CPT | Performed by: EMERGENCY MEDICINE

## 2023-12-30 RX ORDER — ACETAMINOPHEN 325 MG/1
650 TABLET ORAL ONCE
Status: COMPLETED | OUTPATIENT
Start: 2023-12-30 | End: 2023-12-30

## 2023-12-30 RX ADMIN — ACETAMINOPHEN 650 MG: 325 TABLET ORAL at 15:08

## 2023-12-30 SDOH — HEALTH STABILITY: MENTAL HEALTH: DELUSIONS: PARANOID

## 2023-12-30 SDOH — HEALTH STABILITY: MENTAL HEALTH: HAVE YOU EVER DONE ANYTHING, STARTED TO DO ANYTHING, OR PREPARED TO DO ANYTHING TO END YOUR LIFE?: NO

## 2023-12-30 SDOH — HEALTH STABILITY: MENTAL HEALTH: HAVE YOU WISHED YOU WERE DEAD OR WISHED YOU COULD GO TO SLEEP AND NOT WAKE UP?: YES

## 2023-12-30 SDOH — HEALTH STABILITY: MENTAL HEALTH

## 2023-12-30 SDOH — HEALTH STABILITY: MENTAL HEALTH: SUICIDE ASSESSMENT: ADULT (C-SSRS)

## 2023-12-30 SDOH — HEALTH STABILITY: MENTAL HEALTH: BEHAVIORS/MOOD: DELUSIONS;CALM;PARANOID

## 2023-12-30 SDOH — HEALTH STABILITY: MENTAL HEALTH: HAVE YOU ACTUALLY HAD ANY THOUGHTS OF KILLING YOURSELF?: NO

## 2023-12-30 ASSESSMENT — LIFESTYLE VARIABLES
EVER HAD A DRINK FIRST THING IN THE MORNING TO STEADY YOUR NERVES TO GET RID OF A HANGOVER: NO
REASON UNABLE TO ASSESS: NO
HAVE YOU EVER FELT YOU SHOULD CUT DOWN ON YOUR DRINKING: NO
HAVE PEOPLE ANNOYED YOU BY CRITICIZING YOUR DRINKING: NO
EVER FELT BAD OR GUILTY ABOUT YOUR DRINKING: NO

## 2023-12-30 ASSESSMENT — PAIN - FUNCTIONAL ASSESSMENT: PAIN_FUNCTIONAL_ASSESSMENT: 0-10

## 2023-12-30 NOTE — ED TRIAGE NOTES
"Pt presents to ed for left side wrist an ankle pain, and then states he has no where to stay and starts having paranoid thoughts saying  \"they are trying to steal his money\" \"they are coming after me\"  \"they have my social security card, they niyah the landlord\". \"Its cold outside I can't stay there\"  "
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
## 2024-01-04 NOTE — ED PROVIDER NOTES
CC: Abdominal Pain     HPI:  43-year-old male well-known to the emergency department presents to the emergency department.  Initially told triage she was having abdominal pain, denies any abdominal pain for me, complaining of pain in his left wrist.  States is from when he was cutting himself 20 years ago.  No new injuries.  Has old scars over his left wrist.  States he wants something stronger than Motrin and Tylenol.    Records Reviewed:  Recent available ED and inpatient notes reviewed in EMR.    PMHx/PSHx:  Per HPI.   - has no past medical history on file.  - has a past surgical history that includes CT angio aorta and bilateral iliofemoral runoff w and or wo IV contrast (11/3/2022).    Medications:  Reviewed in EMR. See EMR for complete list of medications and doses.    Allergies:  Patient has no known allergies.    Social History:  - Tobacco:  reports that he has never smoked. He has never used smokeless tobacco.   - Alcohol:  reports no history of alcohol use.   - Illicit Drugs:  has no history on file for drug use.     ROS:  Per HPI.       ???????????????????????????????????????????????????????????????  Triage Vitals:  T 36.8 °C (98.2 °F)    /78  RR 18  O2 96 %      Physical Exam  Vitals and nursing note reviewed.   Constitutional:       General: He is not in acute distress.     Comments: Unkempt, poor hygiene, sitting in chair   HENT:      Head: Atraumatic.   Eyes:      General: No scleral icterus.     Conjunctiva/sclera: Conjunctivae normal.   Pulmonary:      Effort: Pulmonary effort is normal. No respiratory distress.   Musculoskeletal:         General: No tenderness (No tenderness or deformities to the left wrist.).   Skin:     Comments: Old horizontal scars to the volar aspect of the left wrist.   Neurological:      Mental Status: He is alert.      Cranial Nerves: No facial asymmetry.   Psychiatric:         Mood and Affect: Mood normal.         Behavior: Behavior normal.  CC: right neck scar revision    HPI: This is a 13 y.o. female with a right neck scar revision that has been present for years. She is seen in the company of her  mother at our [facility]IC SURGERY office.  There are no modifying factors and there are no systemic associated signs and symptoms. She was born at 27 WGA and spent months in NICU. While in the NICU she had multiple lines placed in the right neck. She has a resulting scar in the right neck from this that she would like revised.     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     She has also had multiple abdominal surgeries as an infant and if possible she would like some the scars revised. The scars do not cause her pain.     Past Medical History:   Diagnosis Date    *Hypoxemia     27-28 wks gestation completed     Adrenal insufficiency     Bronchopulmonary dysplasia     Bruxism     Chronic lung disease of prematurity     Craniosynostoses     Hemangioma     Meconium ileus     Reflux     S/P repair of PDA     Wheezing-associated respiratory infection        Patient Active Problem List   Diagnosis    Chronic lung disease of prematurity    27-28 wks gestation completed    Reflux    Hemangioma    S/P repair of PDA    Other specified disorders of metabolism(277.89)    Wheezing-associated respiratory infection    Craniosynostosis    Meconium ileus (of )    Short stature    Slow weight gain    Flat foot    Chronic pain of right ankle    Weakness of both hips    Ankle weakness       Past Surgical History:   Procedure Laterality Date    anostomy      age 2 days    Bicoronal craniosynostosis repair      OTHER SURGICAL HISTORY      PDA ligation    OTHER SURGICAL HISTORY      Bowel and esophagus repair    OTHER SURGICAL HISTORY      PDA ligation    OTHER SURGICAL HISTORY      ostomy reversal       Meds: None    Review of patient's allergies indicates:   Allergen Reactions    No        ???????????????????????????????????????????????????????????????  Assessment and Plan:  43-year-old male well-known to me and to the emergency department presents to the emergency department with left wrist pain.  Has remote self cutting scars to the volar aspect of his wrist, no tenderness, no new injuries.  No other complaints.  Educated the patient that we would not be giving him medication stronger than anti-inflammatories or Tylenol.  He is agreeable to Motrin.  Discharged home in stable condition.    Social Determinants Limiting Care:  Housing insecurity and Food insecurity    Disposition:  Discharge    --  Kathia Del Castillo MD  Emergency Medicine, PGY-3      Procedures ? SmartLinks last updated 10/3/2023 2:31 AM        Kathia Del Castillo MD  Resident  10/03/23 0347       Kathia Del Castillo MD  Resident  10/03/23 0407     known drug allergies      No family history on file.    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        ROS  As above  All other systems negative    PE  There is 4cm scar on Parris's right neck that is widened and there is a significant countour deformity present. There are multiple puncture sites present here from sutures that were holding her central line in place.     She has multiple abdominal scars from prior interventions when she was a  that demonstrate contour deformities. She is not interested in abdominal scar revision.     Assessment and Plan:  Assessment   Parris is a 13 year old girl with a right neck scar. This can be revised under local anesthesia at a time of the patient's choosing.         Procedure room  90 minutes

## 2024-01-19 ENCOUNTER — APPOINTMENT (OUTPATIENT)
Dept: RADIOLOGY | Facility: HOSPITAL | Age: 44
End: 2024-01-19
Payer: COMMERCIAL

## 2024-01-19 ENCOUNTER — HOSPITAL ENCOUNTER (EMERGENCY)
Facility: HOSPITAL | Age: 44
Discharge: HOME | End: 2024-01-19
Attending: EMERGENCY MEDICINE
Payer: COMMERCIAL

## 2024-01-19 VITALS
OXYGEN SATURATION: 99 % | HEART RATE: 75 BPM | TEMPERATURE: 98.2 F | BODY MASS INDEX: 45.47 KG/M2 | HEIGHT: 68 IN | DIASTOLIC BLOOD PRESSURE: 70 MMHG | SYSTOLIC BLOOD PRESSURE: 128 MMHG | WEIGHT: 300 LBS | RESPIRATION RATE: 17 BRPM
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
## 2024-01-19 PROCEDURE — 73552 X-RAY EXAM OF FEMUR 2/>: CPT | Mod: LEFT SIDE | Performed by: STUDENT IN AN ORGANIZED HEALTH CARE EDUCATION/TRAINING PROGRAM

## 2024-01-19 PROCEDURE — 99284 EMERGENCY DEPT VISIT MOD MDM: CPT | Performed by: EMERGENCY MEDICINE

## 2024-01-19 PROCEDURE — 73110 X-RAY EXAM OF WRIST: CPT | Mod: LEFT SIDE | Performed by: STUDENT IN AN ORGANIZED HEALTH CARE EDUCATION/TRAINING PROGRAM

## 2024-01-19 PROCEDURE — 73552 X-RAY EXAM OF FEMUR 2/>: CPT | Mod: LT

## 2024-01-19 PROCEDURE — 2500000001 HC RX 250 WO HCPCS SELF ADMINISTERED DRUGS (ALT 637 FOR MEDICARE OP): Mod: SE

## 2024-01-19 PROCEDURE — 73110 X-RAY EXAM OF WRIST: CPT | Mod: LT

## 2024-01-19 PROCEDURE — 73552 X-RAY EXAM OF FEMUR 2/>: CPT | Mod: RT

## 2024-01-19 RX ORDER — IBUPROFEN 600 MG/1
600 TABLET ORAL ONCE
Status: COMPLETED | OUTPATIENT
Start: 2024-01-19 | End: 2024-01-19

## 2024-01-19 RX ORDER — ACETAMINOPHEN 325 MG/1
TABLET ORAL
Status: COMPLETED
Start: 2024-01-19 | End: 2024-01-19

## 2024-01-19 RX ORDER — ACETAMINOPHEN 325 MG/1
650 TABLET ORAL ONCE
Status: COMPLETED | OUTPATIENT
Start: 2024-01-19 | End: 2024-01-19

## 2024-01-19 RX ORDER — IBUPROFEN 600 MG/1
600 TABLET ORAL EVERY 6 HOURS PRN
Qty: 28 TABLET | Refills: 0 | Status: SHIPPED | OUTPATIENT
Start: 2024-01-19 | End: 2024-01-26

## 2024-01-19 RX ADMIN — IBUPROFEN 600 MG: 600 TABLET ORAL at 23:05

## 2024-01-19 RX ADMIN — ACETAMINOPHEN 650 MG: 325 TABLET ORAL at 20:45

## 2024-01-20 NOTE — ED PROVIDER NOTES
CC: Fall     HPI:  Patient is a 43-year-old male with history of hypertension, substance abuse, schizophrenia who presents to the ED with multiple medical complaints.  He endorses left upper extremity pain and bilateral lower extremity pain and states he has an abrasion between his thighs after a fall earlier today.  He denies any head trauma or loss of consciousness.  He states he slipped and fell.  He is uncooperative with my examination and will not ambulate for me.  Strength and sensation in bilateral upper and lower extremities is overall intact.  He has full mobility of bilateral upper extremities without signs of deformity or tenderness to palpation.  No anatomic snuffbox tenderness bilaterally.  Patient is requesting pain medication.  I reviewed outpatient medical records and patient has been seen in the ED multiple times in the past week for similar complaint.  He denies any chest pain, shortness of breath at this time.    Limitations to history: None  Independent historian(s): Patient  Records Reviewed: Recent available ED and inpatient notes reviewed in EMR.    PMHx/PSHx:  Per HPI.   - has no past medical history on file.  - has a past surgical history that includes CT angio aorta and bilateral iliofemoral runoff w and or wo IV contrast (11/3/2022).    Medications:  Reviewed in EMR. See EMR for complete list of medications and doses.    Allergies:  Amoxicillin    Social History:  - Tobacco:  reports that he has never smoked. He has never used smokeless tobacco.   - Alcohol:  reports no history of alcohol use.   - Illicit Drugs:  has no history on file for drug use.     ROS:  Per HPI.       ???????????????????????????????????????????????????????????????  Triage Vitals:  T 36.8 °C (98.2 °F)  HR 99  /75  RR 18  O2 96 %      Physical Exam    General: Patient resting comfortably, no acute distress, overall well appearing, and appropriately conversational.   Head: Normocephalic. Atraumatic.  Neck:  FROM. No gross masses.   Eyes: EOMI. Conjunctiva clear.   ENT: Moist mucous membranes, no apparent trauma or lesions.  CV: Regular rate. 2+ radial pulses bilaterally.  Resp: No respiratory distress, breathing easily. Speaks in full sentences.    GI: Soft, non-distended. No tenderness with palpation.  : No suprapubic or CVA tenderness.  MSK: Full ROM in bilateral upper and lower extremities. No gross step offs or deformities.  EXT: No peripheral edema, contusions, or wounds.   Skin: Warm and dry, no rashes or lesions.  Neuro: Alert. No focal neurological deficits. Motor and sensation intact throughout. Speech fluent. Normal gait.   Psych: Appropriate mood and behavior, converses and responds appropriately.    ???????????????????????????????????????????????????????????????    MDM:  Patient is a 43-year-old male with history of hypertension, substance abuse, schizophrenia who presents to the ED with multiple medical complaints.  He endorses left upper extremity pain and bilateral lower extremity pain and states he has an abrasion between his thighs after a fall earlier today.  He denies any head trauma or loss of consciousness.  He states he slipped and fell.  He is uncooperative with my examination and will not ambulate for me.  Strength and sensation in bilateral upper and lower extremities is overall intact.  He has full mobility of bilateral upper extremities without signs of deformity or tenderness to palpation.  No anatomic snuffbox tenderness bilaterally.  Patient is requesting pain medication.  I reviewed outpatient medical records and patient has been seen in the ED multiple times in the past week for similar complaint.  He denies any chest pain, shortness of breath at this time.  Patient given Tylenol.  No signs of traumatic injury on my examination warranting labs, will obtain bilateral femur x-rays and left wrist and forearm x-ray.  Patient does not appear to be in acute psychiatric decompensation  warranting further workup as well.  I believe patient's homelessness may be contributing to presentation today due to weather changes.    ED Course:  ED Course as of 01/19/24 2242 Fri Jan 19, 2024 2241 Wrist XR negative   [SA]   2242 IMPRESSION:  1. No acute fracture or malalignment.  2. Marked tricompartmental degenerative changes of the bilateral knee  joints, most severe on the right and at the patellofemoral joint  compartment.  3. Mild degenerative changes of the bilateral hip joints.   [SA]      ED Course User Index  [SA] Delma Rand DO         Diagnoses as of 01/19/24 2242   Fall, initial encounter       Social Determinants Limiting Care:  Financial difficulties, Housing insecurity, Poor housing conditions, and Poor health literacy    Disposition:  Discharged       Delma Rand DO  Emergency Medicine PGY-2  [facility]      Procedures ? SmartLinks last updated 1/19/2024 10:42 PM        Delma Rand DO  Resident  01/19/24 2242

## 2024-01-20 NOTE — ED TRIAGE NOTES
Pt to ED c/o fall earlier today outside on the ice. Pt complaining of bilateral inner thigh pain. Pt states he fell and landed on his butt, denies hitting his head or losing consciousness. Pt denies any use of blood thinners. Pt also c/o left wrist pain; MSP's intact. Pt denies ay SI/HI, denies any hallucinations.

## 2024-02-09 ENCOUNTER — HOSPITAL ENCOUNTER (EMERGENCY)
Facility: HOSPITAL | Age: 44
Discharge: HOME | End: 2024-02-10
Attending: STUDENT IN AN ORGANIZED HEALTH CARE EDUCATION/TRAINING PROGRAM
Payer: COMMERCIAL

## 2024-02-09 ENCOUNTER — APPOINTMENT (OUTPATIENT)
Dept: RADIOLOGY | Facility: HOSPITAL | Age: 44
End: 2024-02-09
Payer: COMMERCIAL

## 2024-02-09 VITALS
RESPIRATION RATE: 18 BRPM | OXYGEN SATURATION: 96 % | HEART RATE: 97 BPM | BODY MASS INDEX: 45.47 KG/M2 | DIASTOLIC BLOOD PRESSURE: 87 MMHG | SYSTOLIC BLOOD PRESSURE: 155 MMHG | WEIGHT: 300 LBS | HEIGHT: 68 IN | TEMPERATURE: 97.9 F

## 2024-02-09 DIAGNOSIS — S93.402A SPRAIN OF LEFT ANKLE, INITIAL ENCOUNTER: Primary | ICD-10-CM

## 2024-02-09 PROCEDURE — 73610 X-RAY EXAM OF ANKLE: CPT | Mod: LEFT SIDE | Performed by: RADIOLOGY

## 2024-02-09 PROCEDURE — 73630 X-RAY EXAM OF FOOT: CPT | Mod: LEFT SIDE | Performed by: RADIOLOGY

## 2024-02-09 PROCEDURE — 99284 EMERGENCY DEPT VISIT MOD MDM: CPT | Performed by: STUDENT IN AN ORGANIZED HEALTH CARE EDUCATION/TRAINING PROGRAM

## 2024-02-09 PROCEDURE — 99284 EMERGENCY DEPT VISIT MOD MDM: CPT | Mod: 25 | Performed by: STUDENT IN AN ORGANIZED HEALTH CARE EDUCATION/TRAINING PROGRAM

## 2024-02-09 PROCEDURE — 73630 X-RAY EXAM OF FOOT: CPT | Mod: LT

## 2024-02-09 PROCEDURE — 73610 X-RAY EXAM OF ANKLE: CPT | Mod: LT

## 2024-02-09 PROCEDURE — 2500000001 HC RX 250 WO HCPCS SELF ADMINISTERED DRUGS (ALT 637 FOR MEDICARE OP): Mod: SE

## 2024-02-09 RX ORDER — IBUPROFEN 600 MG/1
600 TABLET ORAL ONCE
Status: COMPLETED | OUTPATIENT
Start: 2024-02-09 | End: 2024-02-09

## 2024-02-09 RX ADMIN — IBUPROFEN 600 MG: 600 TABLET, FILM COATED ORAL at 23:28

## 2024-02-09 ASSESSMENT — PAIN - FUNCTIONAL ASSESSMENT: PAIN_FUNCTIONAL_ASSESSMENT: 0-10

## 2024-02-09 ASSESSMENT — PAIN DESCRIPTION - LOCATION: LOCATION: ANKLE

## 2024-02-09 ASSESSMENT — PAIN SCALES - GENERAL
PAINLEVEL_OUTOF10: 8
PAINLEVEL_OUTOF10: 10 - WORST POSSIBLE PAIN

## 2024-02-09 ASSESSMENT — PAIN DESCRIPTION - ORIENTATION: ORIENTATION: LEFT

## 2024-02-09 NOTE — Clinical Note
Anibal Caro was seen and treated in our emergency department on 2/9/2024.  He may return to work on 02/11/2024.       If you have any questions or concerns, please don't hesitate to call.      Shanna West MD

## 2024-02-10 RX ORDER — ACETAMINOPHEN 325 MG/1
650 TABLET ORAL EVERY 6 HOURS PRN
Qty: 60 TABLET | Refills: 0 | Status: SHIPPED | OUTPATIENT
Start: 2024-02-10 | End: 2024-02-24

## 2024-02-10 RX ORDER — IBUPROFEN 600 MG/1
600 TABLET ORAL EVERY 6 HOURS PRN
Qty: 30 TABLET | Refills: 0 | Status: SHIPPED | OUTPATIENT
Start: 2024-02-10 | End: 2024-02-17

## 2024-02-10 NOTE — ED PROVIDER NOTES
CC: Ankle Pain     HPI: Anibal Caro is an 43 y.o. male with history including schizophrenia, homelessness, recent hospital station for psychiatric admission and ED visit for left leg pain yesterday presenting to the emergency department for left ankle pain.  Patient reports that he was walking on the steps when he inverted his ankle and fell on it.  He is reporting significant tenderness of the medial aspect as well as over the foot.  Denies additional injury.  Neurovascularly intact.  Denies head strike.  Denies blood thinners denies LOC    Triage note was reviewed and  agree with it  Limitations to History:  none  Additional History Obtained from:  chart review    PMHx/PSHx:  Per HPI.   - has a past medical history of Acute (undifferentiated) schizophrenia (CMS/[facility]).  - has a past surgical history that includes CT angio aorta and bilateral iliofemoral runoff w and or wo IV contrast (11/3/2022).    Social History:  - Tobacco:  reports that he has never smoked. He has never used smokeless tobacco.   - Alcohol:  reports no history of alcohol use.   - Drugs:  has no history on file for drug use.     Medications: Reviewed in EMR.     Allergies:  Amoxicillin    ???????????????????????????????????????????????????????????????  Triage Vitals:  T 36.6 °C (97.9 °F)  HR 97  /87  RR 18  O2 96 %      Physical Exam  Constitutional:       General: He is not in acute distress.  HENT:      Head: Normocephalic.   Cardiovascular:      Rate and Rhythm: Normal rate.   Pulmonary:      Effort: Pulmonary effort is normal. No respiratory distress.   Abdominal:      General: Abdomen is flat.   Musculoskeletal:         General: Swelling present. Normal range of motion.      Comments: Swelling overlying the left ankle. 2+ DP and PT pulses. Sensation intact. Able to move his toes. Bony tenderness overlying the medial malleolus and fourth phalange.   Skin:     General: Skin is dry.   Neurological:      Mental Status: He is alert and  oriented to person, place, and time. Mental status is at baseline.       ???????????????????????????????????????????????????????????????      ED Course/Medical Decision Making:    Diagnoses as of 02/10/24 0107   Sprain of left ankle, initial encounter        Patient is a 43-year-old male presenting with a left ankle pain.  Differential includes fracture versus sprain.  Low concern for DVT, acute limb ischemia, or compartment syndrome.  Will obtain x-rays given Venetie ankle criteria and foot criteria. , patient was given a dose of Tylenol in the ED for pain management.  X-rays are negative for any acute fracture.  Patient was given a Ace wrap and was wrapped.  Patient was reporting improvement patient was able to ambulate.  Patient was discharged home in stable condition.  Patient was given a ride home. Patient was discharged home in stable condition. Patient was advised to return if symptoms worsen or don’t improve. Patient was advised to follow up with their PCP as needed.       External records reviewed: recent inpatient, clinic, and prior ED notes  Diagnostic imaging independently reviewed/interpreted by me (as reflected in MDM) includes: xray ankle and foot  Social Determinants Affecting Care: Financial difficulties, Housing insecurity, Food insecurity, and Transportation difficulties  Discussion of management with other providers: ED attending,    Prescription Drug Consideration: tylenol, ace wrap  Escalation of Care: none    Pt was seen and discussed with ED attending     Impression:   Left ankle sprain    Disposition: discharge        Procedures ? SmartLinks last updated 2/9/2024 11:09 PM     ATTENDING NOTE for Aayush Mckeon MD:    ATTENDING ATTESTATION:  The patient was seen by the resident/fellow.  I have personally performed a substantive portion of the encounter.  I have seen and examined the patient; agree with the workup, evaluation, MDM, management and diagnosis.  The care plan has been discussed with  the resident/fellow; I have reviewed the resident/fellow´s note and agree with the documented findings with the exception/addition of the following:    Patient presents with left ankle pain after he was walking and slipped causing him to aliya his ankle.  He has tenderness over the deltoid ligament.  We got x-rays of the foot and ankle which were negative for acute pathology.  Doubt that he has a syndesmotic injury and he has no laxity at the midfoot joints so doubt that he has a Lisfranc injury.  He is neurovascular intact.  Ace wrap was applied and he was recommended Tylenol Motrin and RICE therapy.  ---------------------------------------------------------     Shanna West MD  Resident  02/10/24 0203

## 2024-02-10 NOTE — ED TRIAGE NOTES
Patient is complaining of left ankle pain after walking down the steps. Patient denies any fall but states he hurt his ankle.

## 2024-02-10 NOTE — DISCHARGE INSTRUCTIONS
You have been evaluated in the  Emergency Department today for ankle pain. The x-ray of your ankle showed no acute fracture    You can alternate Tylenol every 4-6 hours to help control your pain. Please apply voltaran gel to the area of pain. Do not use motrin with voltaran. Please also rest, ice, and elevate your ankle to control your pain.      Please follow up with your primary care physician as needed. If you do not have a primary doctor, please schedule an appointment with the [facility]. If you do not have a primary care doctor you may call [phone] to make an appointment.    Return to the Emergency Department if you experience worsening pain, numbness/tingling, change of color in your toes, or any other concerning symptoms.     Thank you for choosing us for your care.      --   [facility]  Phone: [phone] or [phone]

## 2024-02-22 ENCOUNTER — HOSPITAL ENCOUNTER (EMERGENCY)
Facility: HOSPITAL | Age: 44
Discharge: HOME | End: 2024-02-22
Attending: EMERGENCY MEDICINE
Payer: COMMERCIAL

## 2024-02-22 VITALS
WEIGHT: 299.83 LBS | RESPIRATION RATE: 18 BRPM | TEMPERATURE: 97.5 F | OXYGEN SATURATION: 97 % | BODY MASS INDEX: 45.44 KG/M2 | HEART RATE: 84 BPM | HEIGHT: 68 IN

## 2024-02-22 DIAGNOSIS — Z76.89 ENCOUNTER FOR SOCIAL WORK INTERVENTION: Primary | ICD-10-CM

## 2024-02-22 PROCEDURE — 2500000001 HC RX 250 WO HCPCS SELF ADMINISTERED DRUGS (ALT 637 FOR MEDICARE OP): Mod: SE | Performed by: STUDENT IN AN ORGANIZED HEALTH CARE EDUCATION/TRAINING PROGRAM

## 2024-02-22 PROCEDURE — 99283 EMERGENCY DEPT VISIT LOW MDM: CPT

## 2024-02-22 PROCEDURE — 99284 EMERGENCY DEPT VISIT MOD MDM: CPT | Performed by: EMERGENCY MEDICINE

## 2024-02-22 RX ORDER — IBUPROFEN 600 MG/1
600 TABLET ORAL ONCE
Status: COMPLETED | OUTPATIENT
Start: 2024-02-22 | End: 2024-02-22

## 2024-02-22 RX ADMIN — IBUPROFEN 600 MG: 600 TABLET, FILM COATED ORAL at 21:46

## 2024-02-22 ASSESSMENT — LIFESTYLE VARIABLES
EVER FELT BAD OR GUILTY ABOUT YOUR DRINKING: NO
HAVE YOU EVER FELT YOU SHOULD CUT DOWN ON YOUR DRINKING: NO
HAVE PEOPLE ANNOYED YOU BY CRITICIZING YOUR DRINKING: NO
EVER HAD A DRINK FIRST THING IN THE MORNING TO STEADY YOUR NERVES TO GET RID OF A HANGOVER: NO

## 2024-02-22 ASSESSMENT — COLUMBIA-SUICIDE SEVERITY RATING SCALE - C-SSRS
2. HAVE YOU ACTUALLY HAD ANY THOUGHTS OF KILLING YOURSELF?: NO
6. HAVE YOU EVER DONE ANYTHING, STARTED TO DO ANYTHING, OR PREPARED TO DO ANYTHING TO END YOUR LIFE?: NO
1. IN THE PAST MONTH, HAVE YOU WISHED YOU WERE DEAD OR WISHED YOU COULD GO TO SLEEP AND NOT WAKE UP?: NO

## 2024-02-22 ASSESSMENT — PAIN - FUNCTIONAL ASSESSMENT: PAIN_FUNCTIONAL_ASSESSMENT: 0-10

## 2024-02-22 ASSESSMENT — PAIN DESCRIPTION - LOCATION: LOCATION: ANKLE

## 2024-02-22 ASSESSMENT — PAIN DESCRIPTION - ORIENTATION: ORIENTATION: LEFT

## 2024-02-22 ASSESSMENT — PAIN SCALES - GENERAL: PAINLEVEL_OUTOF10: 10 - WORST POSSIBLE PAIN

## 2024-02-22 ASSESSMENT — PAIN DESCRIPTION - PAIN TYPE: TYPE: ACUTE PAIN

## 2024-02-23 NOTE — ED PROVIDER NOTES
HPI:  Patient is a 43-year-old male with a history of schizophrenia, homelessness, hypertension who presents with chronic right ankle and wrist pain.  Patient denies recent trauma and states his pain has been ongoing since 2000.  Patient denies extremity weakness or paresthesias.  He is currently requesting social work consult but will not specify why.  He is additionally requesting clothing, as his pants recently ripped.    ROS: A 10-system ROS was performed and was negative except as documented in the HPI.    PMH/PSH: Reviewed in EMR. As above in HPI.  SH: Denies EtOH, tobacco or illicit drug use.  Allergies:   Allergies   Allergen Reactions    Amoxicillin Unknown     GI upset    Bee Pollen Rash     Other reaction(s): Intolerance      Medications: See prescription writer for full medication list.     General: no acute distress, appropriate conversation, poor hygiene  HEENT:  No rhinorrhea. MMM.  Cardiac: regular rate rhythm, no murmurs  Pulm:  normal respiratory effort on room air, equal chest expansion, clear bilaterally, no wheeze or crackles  GI: soft, nontender, nondistended, +BS  Extremities:  moves all extremities freely, no edema noted, ambulatory independently.  Full range of motion to left wrist and ankle.  No joint swelling or tenderness to palpation.  No snuffbox tenderness to palpation.  2+ radial and DP pulses bilaterally.  Poor foot hygiene.  Normal plantar/dorsiflexion against resistance.  Normal  strength.  Normal reported distal sensation to the fingertips and toes.  Skin: warm, well-perfused, no lesions noted on exposed skin.  Neuro:  AOx3, moves all 4 extremities freely and independently   Psych: intermittently speaks and laughs to himself, normal affect    Assessment/Plan/MDM  Patient is a 43-year-old male with a history of schizophrenia, homelessness, hypertension who presents with chronic right ankle and wrist pain.  There are no concerns for septic joint, acute fracture or dislocation  based on clinical examination.  Patient evaluated by social work, see note for details.  Patient provided with a meal, bus pass and pants in addition to socks.  Discussed appropriate foot hygiene with the patient who acknowledged.  Patient discharged in stable condition.    ED Course/Progress:    Diagnoses as of 02/23/24 0121   Encounter for social work intervention      Clinical Impression: as above  Dispo:   Home: I discussed the differential, results and discharge plan with the patient.  I explained reasons for the patient to return to the Emergency Department.  Questions were addressed.  They understand return precautions and discharge instructions. The patient expressed understanding and agreement with assessment/plan.     Pt seen and discussed with attending physician, Dr. Ronen Mohr MD  PGY3, Emergency Medicine    Disclaimer: This note was dictated by speech recognition. An attempt at proof reading was made to minimize errors. Errors in transcription may be present.  Please call if questions.      Gloria Mohr MD  Resident  02/23/24 0123

## 2024-02-23 NOTE — PROGRESS NOTES
Anibal Caro is a 43 y.o. male on day 0 of admission presenting with No Principal Problem: There is no principal problem currently on the Problem List. Please update the Problem List and refresh..    LSW consulted by nurse due to pt requesting to speak with SW. LSW met with pt while he was in [employer] chair. Pt was observed to have a strong odor and disheveled appearance. He had ripped pants, smelled of urine and had debris in hair. Pt was kind and engaging when speaking with SW. He reported he needed LYFT back home because he was injured. When asked about taking the bus, pt reporting due to his ankle and cut on his wrist, he can't take the bus. LSW stated she would attempt to call insurance for pt. LSW also provided pt with information for [employer] and his  (LSW reviewed chart notes). Pt thanked LSW for the information and reported he needed to get a cell phone so he could call places.     LSW attempted to schedule a ride through insurance and was unable. LSW informed pt he would be provided with a bus pass and pt agreed. No further SW needs.   Demi Sahu, [facility], LSW

## 2024-02-23 NOTE — ED TRIAGE NOTES
"Patient presents to the ED for evaluation of left ankle pain that began today. He states that he also has had left wrist pain with a \"cut\" there. He is requesting a bus ticket as well. He is requesting to talk to social work. Patient denies any SI/HI.  "

## 2024-02-27 ENCOUNTER — HOSPITAL ENCOUNTER (EMERGENCY)
Facility: HOSPITAL | Age: 44
Discharge: HOME | End: 2024-02-28
Payer: COMMERCIAL

## 2024-02-27 PROCEDURE — 4500999001 HC ED NO CHARGE

## 2024-02-28 ENCOUNTER — HOSPITAL ENCOUNTER (EMERGENCY)
Facility: HOSPITAL | Age: 44
Discharge: HOME | End: 2024-02-28
Payer: COMMERCIAL

## 2024-02-28 VITALS
RESPIRATION RATE: 18 BRPM | DIASTOLIC BLOOD PRESSURE: 90 MMHG | TEMPERATURE: 97.7 F | SYSTOLIC BLOOD PRESSURE: 143 MMHG | HEART RATE: 104 BPM | OXYGEN SATURATION: 94 %

## 2024-02-28 VITALS
HEART RATE: 92 BPM | RESPIRATION RATE: 15 BRPM | SYSTOLIC BLOOD PRESSURE: 105 MMHG | OXYGEN SATURATION: 95 % | TEMPERATURE: 97.5 F | DIASTOLIC BLOOD PRESSURE: 77 MMHG

## 2024-02-28 LAB
ALBUMIN SERPL BCP-MCNC: 3.7 G/DL (ref 3.4–5)
ALP SERPL-CCNC: 88 U/L (ref 33–120)
ALT SERPL W P-5'-P-CCNC: 19 U/L (ref 10–52)
AMPHETAMINES UR QL SCN: ABNORMAL
ANION GAP SERPL CALC-SCNC: 12 MMOL/L (ref 10–20)
AST SERPL W P-5'-P-CCNC: 12 U/L (ref 9–39)
BARBITURATES UR QL SCN: ABNORMAL
BASOPHILS # BLD AUTO: 0.07 X10*3/UL (ref 0–0.1)
BASOPHILS NFR BLD AUTO: 1.2 %
BENZODIAZ UR QL SCN: ABNORMAL
BILIRUB SERPL-MCNC: 0.2 MG/DL (ref 0–1.2)
BUN SERPL-MCNC: 16 MG/DL (ref 6–23)
BZE UR QL SCN: ABNORMAL
CALCIUM SERPL-MCNC: 9.9 MG/DL (ref 8.6–10.6)
CANNABINOIDS UR QL SCN: ABNORMAL
CHLORIDE SERPL-SCNC: 106 MMOL/L (ref 98–107)
CO2 SERPL-SCNC: 27 MMOL/L (ref 21–32)
CREAT SERPL-MCNC: 1.01 MG/DL (ref 0.5–1.3)
EGFRCR SERPLBLD CKD-EPI 2021: >90 ML/MIN/1.73M*2
EOSINOPHIL # BLD AUTO: 0.2 X10*3/UL (ref 0–0.7)
EOSINOPHIL NFR BLD AUTO: 3.3 %
ERYTHROCYTE [DISTWIDTH] IN BLOOD BY AUTOMATED COUNT: 14.3 % (ref 11.5–14.5)
FENTANYL+NORFENTANYL UR QL SCN: ABNORMAL
GLUCOSE SERPL-MCNC: 86 MG/DL (ref 74–99)
HCT VFR BLD AUTO: 46.4 % (ref 41–52)
HGB BLD-MCNC: 15.1 G/DL (ref 13.5–17.5)
IMM GRANULOCYTES # BLD AUTO: 0.01 X10*3/UL (ref 0–0.7)
IMM GRANULOCYTES NFR BLD AUTO: 0.2 % (ref 0–0.9)
LYMPHOCYTES # BLD AUTO: 2.54 X10*3/UL (ref 1.2–4.8)
LYMPHOCYTES NFR BLD AUTO: 41.9 %
MCH RBC QN AUTO: 27.4 PG (ref 26–34)
MCHC RBC AUTO-ENTMCNC: 32.5 G/DL (ref 32–36)
MCV RBC AUTO: 84 FL (ref 80–100)
MONOCYTES # BLD AUTO: 0.78 X10*3/UL (ref 0.1–1)
MONOCYTES NFR BLD AUTO: 12.9 %
NEUTROPHILS # BLD AUTO: 2.46 X10*3/UL (ref 1.2–7.7)
NEUTROPHILS NFR BLD AUTO: 40.5 %
NRBC BLD-RTO: 0 /100 WBCS (ref 0–0)
OPIATES UR QL SCN: ABNORMAL
OXYCODONE+OXYMORPHONE UR QL SCN: ABNORMAL
PCP UR QL SCN: ABNORMAL
PLATELET # BLD AUTO: 323 X10*3/UL (ref 150–450)
POTASSIUM SERPL-SCNC: 4.2 MMOL/L (ref 3.5–5.3)
PROT SERPL-MCNC: 7.1 G/DL (ref 6.4–8.2)
RBC # BLD AUTO: 5.51 X10*6/UL (ref 4.5–5.9)
SODIUM SERPL-SCNC: 141 MMOL/L (ref 136–145)
WBC # BLD AUTO: 6.1 X10*3/UL (ref 4.4–11.3)

## 2024-02-28 PROCEDURE — 85025 COMPLETE CBC W/AUTO DIFF WBC: CPT | Performed by: EMERGENCY MEDICINE

## 2024-02-28 PROCEDURE — 80307 DRUG TEST PRSMV CHEM ANLYZR: CPT | Performed by: EMERGENCY MEDICINE

## 2024-02-28 PROCEDURE — 80053 COMPREHEN METABOLIC PANEL: CPT | Performed by: EMERGENCY MEDICINE

## 2024-02-28 PROCEDURE — 4500999001 HC ED NO CHARGE

## 2024-02-28 PROCEDURE — 36415 COLL VENOUS BLD VENIPUNCTURE: CPT | Performed by: EMERGENCY MEDICINE

## 2024-02-28 PROCEDURE — 99281 EMR DPT VST MAYX REQ PHY/QHP: CPT

## 2024-02-28 ASSESSMENT — COLUMBIA-SUICIDE SEVERITY RATING SCALE - C-SSRS
1. IN THE PAST MONTH, HAVE YOU WISHED YOU WERE DEAD OR WISHED YOU COULD GO TO SLEEP AND NOT WAKE UP?: NO
1. IN THE PAST MONTH, HAVE YOU WISHED YOU WERE DEAD OR WISHED YOU COULD GO TO SLEEP AND NOT WAKE UP?: NO
2. HAVE YOU ACTUALLY HAD ANY THOUGHTS OF KILLING YOURSELF?: NO
6. HAVE YOU EVER DONE ANYTHING, STARTED TO DO ANYTHING, OR PREPARED TO DO ANYTHING TO END YOUR LIFE?: NO
2. HAVE YOU ACTUALLY HAD ANY THOUGHTS OF KILLING YOURSELF?: NO
6. HAVE YOU EVER DONE ANYTHING, STARTED TO DO ANYTHING, OR PREPARED TO DO ANYTHING TO END YOUR LIFE?: NO

## 2024-02-28 ASSESSMENT — PAIN SCALES - GENERAL: PAINLEVEL_OUTOF10: 10 - WORST POSSIBLE PAIN

## 2024-02-28 ASSESSMENT — PAIN - FUNCTIONAL ASSESSMENT: PAIN_FUNCTIONAL_ASSESSMENT: 0-10

## 2024-02-28 NOTE — ED NOTES
Pt left. Seen by bus stop. No distress. Pt denied SI/HI/AH/VH.     Jerry Arrieta RN  02/28/24 2665

## 2024-02-28 NOTE — ED NOTES
Pt states he would like to go home. Informed he may return at any time without penalty.     Jerry Arrieta RN  02/28/24 8233

## 2024-03-28 ENCOUNTER — HOSPITAL ENCOUNTER (EMERGENCY)
Facility: HOSPITAL | Age: 44
Discharge: HOME | End: 2024-03-28
Payer: COMMERCIAL

## 2024-03-28 PROCEDURE — 4500999001 HC ED NO CHARGE

## 2024-03-29 ENCOUNTER — CLINICAL SUPPORT (OUTPATIENT)
Dept: EMERGENCY MEDICINE | Facility: HOSPITAL | Age: 44
End: 2024-03-29
Payer: COMMERCIAL

## 2024-03-29 ENCOUNTER — APPOINTMENT (OUTPATIENT)
Dept: RADIOLOGY | Facility: HOSPITAL | Age: 44
End: 2024-03-29
Payer: COMMERCIAL

## 2024-03-29 ENCOUNTER — HOSPITAL ENCOUNTER (EMERGENCY)
Facility: HOSPITAL | Age: 44
Discharge: HOME | End: 2024-03-29
Attending: EMERGENCY MEDICINE
Payer: COMMERCIAL

## 2024-03-29 VITALS
TEMPERATURE: 96.2 F | RESPIRATION RATE: 20 BRPM | DIASTOLIC BLOOD PRESSURE: 60 MMHG | OXYGEN SATURATION: 98 % | SYSTOLIC BLOOD PRESSURE: 129 MMHG | HEART RATE: 94 BPM

## 2024-03-29 VITALS
BODY MASS INDEX: 45.47 KG/M2 | RESPIRATION RATE: 20 BRPM | WEIGHT: 300 LBS | SYSTOLIC BLOOD PRESSURE: 124 MMHG | OXYGEN SATURATION: 97 % | HEIGHT: 68 IN | TEMPERATURE: 98.6 F | HEART RATE: 75 BPM | DIASTOLIC BLOOD PRESSURE: 67 MMHG

## 2024-03-29 DIAGNOSIS — M25.561 ACUTE PAIN OF RIGHT KNEE: Primary | ICD-10-CM

## 2024-03-29 DIAGNOSIS — F20.9 SCHIZOPHRENIA, UNSPECIFIED TYPE (MULTI): ICD-10-CM

## 2024-03-29 DIAGNOSIS — R07.9 CHEST PAIN, UNSPECIFIED TYPE: Primary | ICD-10-CM

## 2024-03-29 PROCEDURE — 93010 ELECTROCARDIOGRAM REPORT: CPT | Performed by: EMERGENCY MEDICINE

## 2024-03-29 PROCEDURE — 93005 ELECTROCARDIOGRAM TRACING: CPT

## 2024-03-29 PROCEDURE — 71046 X-RAY EXAM CHEST 2 VIEWS: CPT

## 2024-03-29 PROCEDURE — 99284 EMERGENCY DEPT VISIT MOD MDM: CPT | Performed by: EMERGENCY MEDICINE

## 2024-03-29 PROCEDURE — 73564 X-RAY EXAM KNEE 4 OR MORE: CPT | Mod: RIGHT SIDE | Performed by: STUDENT IN AN ORGANIZED HEALTH CARE EDUCATION/TRAINING PROGRAM

## 2024-03-29 PROCEDURE — 71046 X-RAY EXAM CHEST 2 VIEWS: CPT | Performed by: RADIOLOGY

## 2024-03-29 PROCEDURE — 99284 EMERGENCY DEPT VISIT MOD MDM: CPT | Mod: 25

## 2024-03-29 PROCEDURE — 73564 X-RAY EXAM KNEE 4 OR MORE: CPT | Mod: RT

## 2024-03-29 PROCEDURE — 99283 EMERGENCY DEPT VISIT LOW MDM: CPT

## 2024-03-29 RX ORDER — ACETAMINOPHEN 325 MG/1
650 TABLET ORAL ONCE
Status: DISCONTINUED | OUTPATIENT
Start: 2024-03-29 | End: 2024-03-29 | Stop reason: HOSPADM

## 2024-03-29 RX ORDER — IBUPROFEN 400 MG/1
400 TABLET ORAL ONCE
Status: DISCONTINUED | OUTPATIENT
Start: 2024-03-29 | End: 2024-03-29 | Stop reason: HOSPADM

## 2024-03-29 ASSESSMENT — COLUMBIA-SUICIDE SEVERITY RATING SCALE - C-SSRS
6. HAVE YOU EVER DONE ANYTHING, STARTED TO DO ANYTHING, OR PREPARED TO DO ANYTHING TO END YOUR LIFE?: NO
2. HAVE YOU ACTUALLY HAD ANY THOUGHTS OF KILLING YOURSELF?: NO
1. IN THE PAST MONTH, HAVE YOU WISHED YOU WERE DEAD OR WISHED YOU COULD GO TO SLEEP AND NOT WAKE UP?: NO
1. IN THE PAST MONTH, HAVE YOU WISHED YOU WERE DEAD OR WISHED YOU COULD GO TO SLEEP AND NOT WAKE UP?: NO
2. HAVE YOU ACTUALLY HAD ANY THOUGHTS OF KILLING YOURSELF?: NO
6. HAVE YOU EVER DONE ANYTHING, STARTED TO DO ANYTHING, OR PREPARED TO DO ANYTHING TO END YOUR LIFE?: NO

## 2024-03-29 ASSESSMENT — PAIN SCALES - GENERAL
PAINLEVEL_OUTOF10: 4
PAINLEVEL_OUTOF10: 0 - NO PAIN

## 2024-03-29 ASSESSMENT — LIFESTYLE VARIABLES
TOTAL SCORE: 0
HAVE PEOPLE ANNOYED YOU BY CRITICIZING YOUR DRINKING: NO
HAVE YOU EVER FELT YOU SHOULD CUT DOWN ON YOUR DRINKING: NO
EVER FELT BAD OR GUILTY ABOUT YOUR DRINKING: NO
EVER HAD A DRINK FIRST THING IN THE MORNING TO STEADY YOUR NERVES TO GET RID OF A HANGOVER: NO

## 2024-03-29 ASSESSMENT — PAIN DESCRIPTION - PROGRESSION: CLINICAL_PROGRESSION: NOT CHANGED

## 2024-03-29 ASSESSMENT — PAIN - FUNCTIONAL ASSESSMENT: PAIN_FUNCTIONAL_ASSESSMENT: 0-10

## 2024-03-29 NOTE — PROGRESS NOTES
"Anibal Caro is a 43 y.o. male on day     Subjective   \"I'm paralyzed.\" Pt shouted when this SW attempted to wake him to talk with him. Pt explained he wanted a ride to his residence. Pt didn't respond when this SW asked if he lived at a group home but he confirmed he has roommates. He also confirmed owner is Cali Charles.     Objective   Pt observed sleeping. He asked for a ride home. Pt contends he lives at [address] [place] [address]. He was unable to recognize the term group home when asked if this is the residence he lvies in. Pt didn't know a current number for the  but accurately recalled the owner.     Assessment/Plan   SW consulted by provider as pt allegedly asked if there is another place to stay at besides [facility]. GALI printed a list of local [facility]'s shelters and brought it bedside. Pt didn't want this literature as he stated he resides at [address] [place] [address]. He states the owner is Cali Charles [phone] which matches Pt's records for a contact number. SW discovered this number is no longer valid and SW was unable to confirm residence though Pt stated it's called [facility].   SW will suggest to the team to provide a lyft to location per Pt's request or provide a bus ticket.       GALI Stringer      " Spironolactone Counseling: Patient advised regarding risks of diarrhea, abdominal pain, hyperkalemia, birth defects (for female patients), liver toxicity and renal toxicity. The patient may need blood work to monitor liver and kidney function and potassium levels while on therapy. The patient verbalized understanding of the proper use and possible adverse effects of spironolactone. All of the patient's questions and concerns were addressed. 72

## 2024-03-29 NOTE — ED TRIAGE NOTES
Patient states that he has been out of his medication all month for schizophrenia.  Patient was checked in earlier for shoulder pain,left the ED and was removed from the board.

## 2024-03-29 NOTE — ED TRIAGE NOTES
PT to the ED with c/o right knee pain and index finger pain after fall off a bench at the train stop.

## 2024-03-29 NOTE — DISCHARGE INSTRUCTIONS
Please return to the ED with any worsening of your chest pain.  Follow-up with your psychiatrist.  Call the above number to schedule a follow-up appoint with primary care doctor.

## 2024-03-29 NOTE — ED PROVIDER NOTES
History of Present Illness   CC: Psychiatric Evaluation     History provided by: Patient  Limitations to History: None    HPI:  Anibal Caro is a 43 y.o. male with past medical history of schizophrenia, polysubstance use disorder presenting to the emergency department with concern of schizophrenia.  States that he has a chemical imbalance which messes with his thinking.  States he does have vague suicidal ideation, denies any plan.  He states that he does have auditory hallucinations that are telling him to harm himself.  Denies any homicidal ideation.  Denies any visual hallucinations.  States that he has developed chest pain emergency department, states his middle of his chest, pulling.  Has a frequent nonproductive cough.  States he has had this cough for a chronic period of time.  Denies any fevers, shortness of breath, abdominal pain, nausea, vomiting.    External Records Reviewed: None    Physical Exam   Triage vitals:  T 35.9 °C (96.7 °F)  HR 85  BP (!) 138/97  RR 15  O2 96 %      Vital signs reviewed in nursing triage note, EMR flow sheets, and at patient's bedside.   General: Awake, alert, in no acute distress  Eyes: Gaze conjugate.  No scleral icterus or injection  HENT: Normo-cephalic, atraumatic. No stridor.  CV: Regular rate, Regular rhythm. Radial pulses 2+ bilaterally  Resp: Breathing non-labored, speaking in full sentences.  Clear to auscultation bilaterally  GI: Soft, non-distended, non-tender. No rebound or guarding.  MSK/Extremities: No gross bony deformities. Moving all extremities  Skin: Warm. Appropriate color  Neuro: Alert. Oriented. Face symmetric. Speech is fluent.  Gross strength and sensation intact in b/l UE and LEs  Psych: Unkempt, disheveled, nonlinear thought process, requiring frequent redirection to answer simple questions.  Endorses vague SI without plan.  Denies HI.  Endorses auditory hallucinations.  Denies visual hallucinations.    ED Course & Medical Decision Making   ED  Course:  ED Course as of 03/29/24 1147   Fri Mar 29, 2024   0850 ECG 12 lead  EKG obtained at 0835 demonstrate normal sinus rhythm with sinus arrhythmia, rate of 91, normal axis, normal intervals, no ST segment or T wave signs of ischemia. []   1018 XR chest 2 views  Chest x-ray reviewed, independently interpreted, no evidence of acute cardiopulmonary process. [SH]      ED Course User Index  [] Joseph Lau MD         Diagnoses as of 03/29/24 1147   Chest pain, unspecified type   Schizophrenia, unspecified type (CMS/HCC)       Differential diagnoses considered include but are not limited to: ACS, pneumonia, pneumothorax, pulmonary embolus, acute arctic dissection, decompensated schizophrenia, SI    Social Determinants Limiting Care: Mental health disorder    MDM:  43 y.o. male history of schizophrenia, polysubstance use disorder presenting to the emergency department with concern for psych eval, chest pain.  On arrival vital signs within normal limits.  Suspect most likely etiology of the patient's pain in his chest is from his chronic cough.  Does not describe symptoms consistent with ACS.  Does not describe symptoms consistent with pulmonary embolus.  Has no significant lower extremity edema or calf swelling or tenderness.  Low risk by Wells score, and PERC negative.  Will send chest pain workup, as well as psych workup.  Will treat pain with Tylenol, ibuprofen.    Patient declined the Tylenol and ibuprofen.  He declined blood work as well.  I reassessed the patient, he states that he is not interested in blood work at this time.  I informed him that without blood work, there is a risk of potential heart attack, or other severe pathology such as renal failure, liver disease, anemia that is causing his chest pain.  Patient voiced understanding, states that he does not think he has any pathologies, and would like to decline blood work at this time.  He does appear to be likely at his psychiatric baseline  given multiple recent ED visits with vague psychiatric complaints.  On repeat examination, he no longer endorses SI.  I feel that patient likely is at his psychiatric baseline, and as such, does have capacity to decline laboratory evaluation at this time.  His EKG and chest x-ray were both reviewed, without evidence of acute pathology.  Suspicion low for acute pulmonary embolus as stated above, history does not seem consistent with acute coronary syndrome, and with negative EKG, do not feel that it is necessary to force the patient to undergo laboratory evaluation.    At the time of discharge, I again offered to complete labs for the patient which he declined.  The patient was evaluated briefly by social work.  Social work states that they will be able to provide him with a bus pass to be homeless shelter.  We discussed return precautions with the patient.  Encouraged him to continue to follow-up with psychiatrist.  He voices understanding.  He does appear to have insight into his schizophrenia, continues to deny SI.  Discharged in stable condition.    Disposition   As a result of the work-up, patient was discharged home.  They were informed of their diagnosis and instructed to come back with any concerns or worsening of condition and was agreeable to the plan as discussed above.  The patient was given the opportunity to ask questions.  All of the patient's questions were answered.  The patient remained stable under my care.    Procedures   Procedures    Patient seen and discussed with ED attending physician.    Anshu Lau MD  PGY 3 Emergency Medicine      ED attending attestation.  43-year-old male with history of of schizophrenia presents to the emergency department with a chief complaint to triage of psychiatric problems.  Patient states that he has a chemical imbalance in his brain.  Patient has been seen 4 times in the last week at [facility] for similar concerns as well as minor somatic complaints.   "Today patient states he has chest pain which is central without any radiation without any associated diaphoresis emesis or exertional worsening.  He states he has had a cough for over a year but denies fever.  Denies any history of blood clots or lower extremity edema.  Denies any history of heart failure or MI.  Physical exam patient is an unkempt -American male with no stigmata of head trauma.  He is hemodynamically stable and afebrile.  He has clear breath sounds without any rhonchi and normal heart sounds.  He has a obese abdomen which is nontender.  He has no lower extremity edema that is pitting.  Mood \"I would like to sleep\" affect is labile.  Endorsed passive SI to the resident but none to me.  Denies any HI.  Last inpatient psychiatric hospitalization was in January of this year at [facility].  Patient also with a history of malingering.  Differential diagnosis includes pneumonia, reactive airway disease, less likely acute coronary syndrome pulmonary embolism or aortic pathology.  Patient has no PE risk factors and PERC is 0.  Patient with no high risk features for acute coronary syndrome.  Ideally we would get labs including a high-sensitivity troponin to adequately or stratify his chest pain.  Patient is refusing labs.  Based on my previous interactions with the patient and his chart I think he is at his mental health baseline and has decision-making capacity.  It was explained to him that we would not be able to rule out acute coronary syndrome with an EKG alone and patient accepted this risk.  We will get an EKG and an x-ray to screen for other life threats as patient did assent to these evaluations.  Patient is at mental health baseline so we will not engage our emergency psychiatric assessment team.  If EKG and x-ray are relatively benign patient will be given food and then discharged           Joseph Lau MD  Resident  03/29/24 1150    "

## 2024-03-30 NOTE — DISCHARGE INSTRUCTIONS
Your x-rays today did not show any evidence of fracture.  If your pain does not improve, if you have worsening pain or difficulty with ambulation, or  are unable to walk, Please return to your closest emergency department.

## 2024-03-30 NOTE — ED PROVIDER NOTES
CC: Hand Pain and Knee Injury     HPI:  Patient is a 43-year-old male with a past medical history of schizophrenia presenting to the emergency department today after he fell while at the bus stop.  Patient states he tripped and braced his fall with his right hand and feels like he hurt his right index finger.  Additionally notes that he has pain in his right knee made it difficult for him to ambulate afterwards.  Patient denies any suicidal ideations, homicidal ideations, visual or auditory hallucinations.  States he otherwise feels well.  He does note that he had chest pain earlier in the day which she was evaluated for here in the emergency department but at this point that is resolved.  Denies any other symptoms at this time.    Limitations to History: none  Additional History provided by: N/A    External Records Reviewed:  Recent available ED and inpatient notes reviewed in EMR.  Reviewed ED note/discharge from earlier today    PMHx/PSHx:  Per HPI.   - has a past medical history of Acute (undifferentiated) schizophrenia (CMS/HCC).  - has a past surgical history that includes CT angio aorta and bilateral iliofemoral runoff w and or wo IV contrast (11/3/2022).    Medications:  Reviewed in EMR. See EMR for complete list of medications and doses.    Allergies:  Amoxicillin and Bee pollen    Social History:  - Tobacco:  reports that he has never smoked. He has never used smokeless tobacco.   - Alcohol:  reports no history of alcohol use.   - Illicit Drugs:  has no history on file for drug use.     ROS:  Per HPI.     ???????????????????????????????????????????????????????????????  Triage Vitals:  T 35.7 °C (96.2 °F)  HR 94  /60  RR 20  O2 98 %      Physical Exam  Constitutional:       General: He is not in acute distress.     Appearance: Normal appearance. He is not ill-appearing or diaphoretic.   HENT:      Head: Normocephalic and atraumatic.   Eyes:      General: No scleral icterus.     Conjunctiva/sclera:  Conjunctivae normal.   Cardiovascular:      Pulses: Normal pulses.   Pulmonary:      Effort: Pulmonary effort is normal. No respiratory distress.   Abdominal:      Tenderness: There is no abdominal tenderness.   Musculoskeletal:         General: Normal range of motion.      Cervical back: Normal range of motion and neck supple.      Comments: Patient index finger not swollen, full range of motion, good cap refill, good radial pulses, no scaphoid or wrist tenderness.  No abnormalities noted.  Right knee difficult to evaluate, does not appear deformed or swollen compared to left knee however due to body habitus difficult to further evaluate.  Negative anterior and posterior drawer test.  5 out of 5 strength bilateral lower extremities.   Skin:     General: Skin is warm and dry.      Capillary Refill: Capillary refill takes less than 2 seconds.   Neurological:      General: No focal deficit present.      Mental Status: He is alert.   Psychiatric:         Mood and Affect: Mood normal.         Behavior: Behavior normal.         Thought Content: Thought content normal.         Judgment: Judgment normal.       ???????????????????????????????????????????????????????????????  ED Course:  Diagnoses as of 03/29/24 2202   Acute pain of right knee       EKG & Images:  Independently reviewed, See ED Course      MDM:  -Patient is a 43-year-old male who presents to the emergency department after a fall.  Patient denies hitting his head, LOC, being on any thinners.  States he braced his fall with his right hand and injured his right index finger.  On my evaluation he had full range of motion in his finger, did not have any tenderness, swelling or pain in his wrist scaphoid area.  Right knee difficulty evaluate due to body habitus, due to patient's concern about ambulation x-ray of the knee ordered.  Patient denies any visual or auditory hallucinations.  Denies any suicidal or homicidal ideations.    X-ray showed no acute findings.  "No fracture. Moderate tricompartmental arthropathy noted.    Patient agreeable with plan.  Patient provided with reasons to return to the emergency department for which they are agreeable.  Patient also provided with follow-up instructions.  Opportunity for questions provided.  Questions and concerns addressed.  They were instructed to return to the emergency department immediately if symptoms worsened, did not improve or if they developed any other concerning symptoms/signs.  Patient verbalized understanding and is agreeable with this plan.  Patient discharged in stable condition.      Final diagnoses:   [M25.561] Acute pain of right knee         Social Determinants Limiting Care:  None identified    Disposition:  Discharge    Kari \"Joaquín\" Arlin  Emergency Medicine Resident, PGY1  [facility]   This patient was seen and staffed with Dr. Foy    Disclaimer: This note was dictated by speech recognition. Minor errors in transcription may be present    Procedures ? [employer] last updated 3/29/2024 10:02 PM        Kari Oliveros DO  Resident  03/29/24 2202    "

## 2024-04-03 LAB
ATRIAL RATE: 91 BPM
P AXIS: 44 DEGREES
P OFFSET: 209 MS
P ONSET: 152 MS
PR INTERVAL: 140 MS
Q ONSET: 222 MS
QRS COUNT: 15 BEATS
QRS DURATION: 88 MS
QT INTERVAL: 374 MS
QTC CALCULATION(BAZETT): 460 MS
QTC FREDERICIA: 430 MS
R AXIS: 42 DEGREES
T AXIS: 30 DEGREES
T OFFSET: 409 MS
VENTRICULAR RATE: 91 BPM

## 2024-04-05 ENCOUNTER — HOSPITAL ENCOUNTER (EMERGENCY)
Facility: HOSPITAL | Age: 44
Discharge: OTHER INSTITUTION | End: 2024-04-05
Payer: COMMERCIAL

## 2024-04-05 ENCOUNTER — HOSPITAL ENCOUNTER (EMERGENCY)
Facility: HOSPITAL | Age: 44
Discharge: HOME | End: 2024-04-05
Payer: COMMERCIAL

## 2024-04-05 VITALS
OXYGEN SATURATION: 96 % | WEIGHT: 300 LBS | DIASTOLIC BLOOD PRESSURE: 85 MMHG | BODY MASS INDEX: 45.61 KG/M2 | HEART RATE: 76 BPM | TEMPERATURE: 96.8 F | RESPIRATION RATE: 18 BRPM | SYSTOLIC BLOOD PRESSURE: 174 MMHG

## 2024-04-05 PROCEDURE — 4500999001 HC ED NO CHARGE: Performed by: EMERGENCY MEDICINE

## 2024-04-05 PROCEDURE — 4500999001 HC ED NO CHARGE

## 2024-04-05 ASSESSMENT — COLUMBIA-SUICIDE SEVERITY RATING SCALE - C-SSRS
1. IN THE PAST MONTH, HAVE YOU WISHED YOU WERE DEAD OR WISHED YOU COULD GO TO SLEEP AND NOT WAKE UP?: NO
2. HAVE YOU ACTUALLY HAD ANY THOUGHTS OF KILLING YOURSELF?: NO
6. HAVE YOU EVER DONE ANYTHING, STARTED TO DO ANYTHING, OR PREPARED TO DO ANYTHING TO END YOUR LIFE?: NO

## 2024-04-05 NOTE — PROGRESS NOTES
"Anibal Caro is a 43 y.o. male on day 0 of admission presenting with Leg Pain.  SW approached by security in the ED lobby - patient has been on the (public) phone in waiting area for an extended period of time. Other patients are asking to use the phone.  SW approached patient, who was on the wall phone, with his cell phone and a  in his lap, sitting in a wheelchair.  SW asked patient if he could let others use the phone in 5-10 minutes. Patient widened his eyes and started shouting at mid volume. I.e. \"why can't you use a different phone, you work here, what's your problem, I need the phone I need the phone why does anyone care\".  SW called  Police, who approached patient in the same way.    Addendum, [id number]:  Patient did re-approach SW, and apologized for yelling earlier; stating \"that wasn't me, that was the demon, you know we're cool I would never talk crazy like that to you\".  Although shortly after, SW was approached by police, who stated that patient had to be asked to leave the ED due to an altercation with another female patient.  SW will update if needed.    "

## 2024-04-12 ENCOUNTER — HOSPITAL ENCOUNTER (EMERGENCY)
Facility: HOSPITAL | Age: 44
Discharge: HOME | End: 2024-04-12
Payer: COMMERCIAL

## 2024-04-12 VITALS
HEIGHT: 63 IN | SYSTOLIC BLOOD PRESSURE: 165 MMHG | DIASTOLIC BLOOD PRESSURE: 124 MMHG | RESPIRATION RATE: 18 BRPM | OXYGEN SATURATION: 94 % | HEART RATE: 94 BPM | BODY MASS INDEX: 53.16 KG/M2 | WEIGHT: 300 LBS | TEMPERATURE: 98.1 F

## 2024-04-12 PROCEDURE — 4500999001 HC ED NO CHARGE

## 2024-05-01 ENCOUNTER — HOSPITAL ENCOUNTER (EMERGENCY)
Facility: HOSPITAL | Age: 44
Discharge: HOME | End: 2024-05-01
Attending: EMERGENCY MEDICINE
Payer: COMMERCIAL

## 2024-05-01 VITALS
HEART RATE: 99 BPM | DIASTOLIC BLOOD PRESSURE: 84 MMHG | RESPIRATION RATE: 18 BRPM | SYSTOLIC BLOOD PRESSURE: 128 MMHG | TEMPERATURE: 98.1 F | WEIGHT: 300 LBS | OXYGEN SATURATION: 97 % | HEIGHT: 68 IN | BODY MASS INDEX: 45.47 KG/M2

## 2024-05-01 DIAGNOSIS — G89.29 WRIST PAIN, CHRONIC, LEFT: Primary | ICD-10-CM

## 2024-05-01 DIAGNOSIS — M25.532 WRIST PAIN, CHRONIC, LEFT: Primary | ICD-10-CM

## 2024-05-01 PROCEDURE — 99283 EMERGENCY DEPT VISIT LOW MDM: CPT | Performed by: EMERGENCY MEDICINE

## 2024-05-01 PROCEDURE — 2500000001 HC RX 250 WO HCPCS SELF ADMINISTERED DRUGS (ALT 637 FOR MEDICARE OP): Mod: SE | Performed by: STUDENT IN AN ORGANIZED HEALTH CARE EDUCATION/TRAINING PROGRAM

## 2024-05-01 PROCEDURE — 99282 EMERGENCY DEPT VISIT SF MDM: CPT

## 2024-05-01 RX ORDER — IBUPROFEN 600 MG/1
600 TABLET ORAL ONCE
Status: COMPLETED | OUTPATIENT
Start: 2024-05-01 | End: 2024-05-01

## 2024-05-01 RX ADMIN — IBUPROFEN 600 MG: 600 TABLET, FILM COATED ORAL at 03:37

## 2024-05-01 ASSESSMENT — COLUMBIA-SUICIDE SEVERITY RATING SCALE - C-SSRS
6. HAVE YOU EVER DONE ANYTHING, STARTED TO DO ANYTHING, OR PREPARED TO DO ANYTHING TO END YOUR LIFE?: NO
2. HAVE YOU ACTUALLY HAD ANY THOUGHTS OF KILLING YOURSELF?: NO
1. IN THE PAST MONTH, HAVE YOU WISHED YOU WERE DEAD OR WISHED YOU COULD GO TO SLEEP AND NOT WAKE UP?: NO

## 2024-05-01 NOTE — ED TRIAGE NOTES
Pt to ED c/o left wrist pain, pt endorsing cramping pain x 2 days denies taking any medications for pain. MSP's intact. Pt denies any HI/SI.

## 2024-05-01 NOTE — ED PROVIDER NOTES
HPI:  Patient is a 43-year-old male with a history of hypertension, schizophrenia who presents with chronic left wrist pain.  Patient is right-hand dominant.  He states he cut his left wrist on the lateral side several years ago secondary to a suicide attempt and that reports cramping intermittently since then.  He denies recent trauma.  No SI or HI.  No tactile, visual or auditory hallucinations.  No finger paresthesias or weakness.  No elbow pain.    ROS: A 10-system ROS was performed and was negative except as documented in the HPI.    PMH/PSH: Reviewed in EMR. As above in HPI.  SH: Denies EtOH or illicit drug use. Pt smokes 4 cigarettes per day.   Allergies:   Allergies   Allergen Reactions    Amoxicillin Unknown and Rash     GI upset    Bee Pollen Rash     Other reaction(s): Intolerance    Grass Pollen Hives     Sneezing      Medications: See prescription writer for full medication list.     General: no acute distress, appropriate conversation  HEENT:  No rhinorrhea. MMM.  Cardiac: regular rate rhythm, no murmurs  Pulm:  normal respiratory effort on room air, equal chest expansion, clear bilaterally, no wheeze or crackles  GI: soft, nontender, nondistended, +BS  Extremities: healed linear laceration noted to the lateral L wrist, no tenderness to palpation. No snuffbox tenderness to palpation. Normal flexion/extension to the L wrist against resistance. 2+ radial pulse, normal  strength, normal reported sensation to the distal fingertips. moves all extremities freely, no edema noted  Skin: warm, well-perfused, no lesions noted on exposed skin.  Neuro:  AOx3, moves all 4 extremities freely and independently     Assessment/Plan/MDM  Patient is a 43-year-old male with a history of hypertension, schizophrenia who presents with chronic left wrist pain.  Distal left upper extremity is neurovascularly intact.  No concerns for carpal tunnel entrapment on clinical examination.  Patient denies trauma there are no bony  deformities.  Given this, imaging is not indicated.  Patient given ibuprofen and advised to take this at home as needed.  He denies SI or HI.  Does not appear to be internally stimulated.  Discharged in stable condition.    ED Course/Progress:    Diagnoses as of 05/01/24 0350   Wrist pain, chronic, left        Clinical Impression: as above  Dispo:   Home: I discussed the differential, results and discharge plan with the patient.  I emphasized the importance of follow-up with PCP PRN.  I explained reasons for the patient to return to the Emergency Department.  Questions were addressed.  They understand return precautions and discharge instructions. The patient expressed understanding and agreement with assessment/plan.     Pt seen and discussed with attending physician, Dr. Vero Mohr MD  PGY3, Emergency Medicine    Disclaimer: This note was dictated by speech recognition. An attempt at proof reading was made to minimize errors. Errors in transcription may be present.  Please call if questions.      Gloria Mohr MD  Resident  05/01/24 5951

## 2024-06-09 ENCOUNTER — HOSPITAL ENCOUNTER (EMERGENCY)
Facility: HOSPITAL | Age: 44
Discharge: LEFT BEFORE DISCHARGE | End: 2024-06-09
Payer: COMMERCIAL

## 2024-06-09 ENCOUNTER — HOSPITAL ENCOUNTER (EMERGENCY)
Facility: HOSPITAL | Age: 44
Discharge: HOME | End: 2024-06-10
Attending: EMERGENCY MEDICINE
Payer: COMMERCIAL

## 2024-06-09 VITALS
TEMPERATURE: 97.7 F | SYSTOLIC BLOOD PRESSURE: 145 MMHG | OXYGEN SATURATION: 98 % | DIASTOLIC BLOOD PRESSURE: 105 MMHG | HEART RATE: 83 BPM | RESPIRATION RATE: 16 BRPM

## 2024-06-09 VITALS
WEIGHT: 300 LBS | TEMPERATURE: 97.8 F | RESPIRATION RATE: 18 BRPM | DIASTOLIC BLOOD PRESSURE: 83 MMHG | SYSTOLIC BLOOD PRESSURE: 139 MMHG | HEIGHT: 68 IN | OXYGEN SATURATION: 97 % | HEART RATE: 105 BPM | BODY MASS INDEX: 45.47 KG/M2
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
## 2024-06-09 PROCEDURE — 99284 EMERGENCY DEPT VISIT MOD MDM: CPT

## 2024-06-09 PROCEDURE — 4500999001 HC ED NO CHARGE

## 2024-06-09 PROCEDURE — 99283 EMERGENCY DEPT VISIT LOW MDM: CPT

## 2024-06-09 ASSESSMENT — COLUMBIA-SUICIDE SEVERITY RATING SCALE - C-SSRS
2. HAVE YOU ACTUALLY HAD ANY THOUGHTS OF KILLING YOURSELF?: NO
6. HAVE YOU EVER DONE ANYTHING, STARTED TO DO ANYTHING, OR PREPARED TO DO ANYTHING TO END YOUR LIFE?: NO
2. HAVE YOU ACTUALLY HAD ANY THOUGHTS OF KILLING YOURSELF?: NO
6. HAVE YOU EVER DONE ANYTHING, STARTED TO DO ANYTHING, OR PREPARED TO DO ANYTHING TO END YOUR LIFE?: NO
1. IN THE PAST MONTH, HAVE YOU WISHED YOU WERE DEAD OR WISHED YOU COULD GO TO SLEEP AND NOT WAKE UP?: NO
1. IN THE PAST MONTH, HAVE YOU WISHED YOU WERE DEAD OR WISHED YOU COULD GO TO SLEEP AND NOT WAKE UP?: NO

## 2024-06-10 ENCOUNTER — APPOINTMENT (OUTPATIENT)
Dept: RADIOLOGY | Facility: HOSPITAL | Age: 44
End: 2024-06-10
Payer: COMMERCIAL

## 2024-06-10 PROCEDURE — 2500000001 HC RX 250 WO HCPCS SELF ADMINISTERED DRUGS (ALT 637 FOR MEDICARE OP): Mod: SE

## 2024-06-10 RX ORDER — IBUPROFEN 600 MG/1
600 TABLET ORAL ONCE
Status: COMPLETED | OUTPATIENT
Start: 2024-06-10 | End: 2024-06-10

## 2024-06-10 RX ADMIN — IBUPROFEN 600 MG: 600 TABLET ORAL at 05:15

## 2024-06-10 ASSESSMENT — LIFESTYLE VARIABLES
HAVE YOU EVER FELT YOU SHOULD CUT DOWN ON YOUR DRINKING: NO
TOTAL SCORE: 0
HAVE PEOPLE ANNOYED YOU BY CRITICIZING YOUR DRINKING: NO
EVER FELT BAD OR GUILTY ABOUT YOUR DRINKING: NO
EVER HAD A DRINK FIRST THING IN THE MORNING TO STEADY YOUR NERVES TO GET RID OF A HANGOVER: NO

## 2024-06-10 NOTE — ED PROVIDER NOTES
"CC: Leg Pain     HPI: Patient is a 43-year-old male with history of schizophrenia, obesity, HTN, substance abuse, presenting to the emergency department today after fall.  Patient has had multiple ED visits in the past month now for similar presentation of falling.  He reports earlier today he landed on his right knee and has been having difficulty walking since then.  Significant tenderness to palpation without effusion or decreased range of motion.  Does appear disheveled and is malodorous.  Feels like his mental health is \"fine\" and at his baseline. Takes Risperdal, Cogentin and Depakote at home.  Denies fevers, chills, chest pain, shortness of breath, abdominal pain, or changes in urination/stool for us.      Records Reviewed: Recent available ED and inpatient notes reviewed in EMR.    PMHx/PSHx:  Per HPI.   - has a past medical history of Acute (undifferentiated) schizophrenia (Multi).  - has a past surgical history that includes CT angio aorta and bilateral iliofemoral runoff w and or wo IV contrast (11/3/2022).  - has Sprain of left ankle, initial encounter; Passive suicidal ideations; Homelessness; and Schizophrenia (Multi) on their problem list.    Medications:  Current Outpatient Medications   Medication Instructions    albuterol 90 mcg/actuation inhaler inhalation, Every 4 hours PRN    amLODIPine (NORVASC) 5 mg, oral, Daily RT    benztropine (COGENTIN) 1 mg, oral, Nightly    cholecalciferol (VITAMIN D-3) 125 mcg, oral, Daily RT    divalproex (DEPAKOTE) 500 mg, oral, 2 times daily    ergocalciferol (VITAMIN D-2) 50,000 Units, oral    gabapentin (Neurontin) 300 mg capsule oral    hydrOXYzine HCL (Atarax) 50 mg tablet oral, 2 times daily PRN    naltrexone ER (VIVITROL) 380 mg, intramuscular    risperiDONE (RISPERDAL) 3 mg, oral    traZODone (Desyrel) 150 mg tablet oral        Allergies:  Amoxicillin, Bee pollen, and Grass pollen    Social History:  - Tobacco:  reports that he has never smoked. He has never " used smokeless tobacco.   - Alcohol:  reports no history of alcohol use.   - Illicit Drugs:  has no history on file for drug use.     ROS:  Per HPI.     ???????????????????????????????????????????????????????????????  Triage Vitals:  T 36.6 °C (97.8 °F)  HR (!) 105  /83  RR 18  O2 97 %      Physical Exam  Constitutional:       Comments: Disheveled, malorodous   HENT:      Head: Normocephalic and atraumatic.   Cardiovascular:      Rate and Rhythm: Normal rate and regular rhythm.      Heart sounds: Normal heart sounds.   Pulmonary:      Effort: Pulmonary effort is normal.      Breath sounds: Normal breath sounds.   Abdominal:      Palpations: Abdomen is soft.      Tenderness: There is no abdominal tenderness.   Musculoskeletal:         General: Normal range of motion.      Comments: Right knee tenderness to palpation.  Full range of motion.  Able to ambulate with mild limp.  No obvious bony step-offs or deformities.   Skin:     General: Skin is warm.   Neurological:      General: No focal deficit present.      Mental Status: He is alert and oriented to person, place, and time.               [place] Coma Scale Score: 15                  ???????????????????????????????????????????????????????????????    Results: Relevant laboratory and radiographic results were reviewed and independently interpreted by myself.  As necessary, they are commented on in the ED Course.    Medical Decision Making   Patient is a 43-year-old male presenting the emergency department today for right knee pain after fall.  On arrival, borderline tachycardic at 105, rest of vitals within normal limits.  On examination, patient appears disheveled but otherwise clinically well.  Has mild tenderness to palpation of the right knee joint.  Has presented multiple times with similar findings however given his acute fall today we will get x-rays of the right lower extremity for definitive fracture rule out.  Symptomatically treated with ibuprofen  "here in emergency department. After ibuprofen patient feels significantly clinically improved.  Patient went over the x-ray scanner and said he \"did not want to stand up to get on the table\".  Was brought back to the waiting room and he is requesting to leave at this time.  Able to ambulate on his own and has capacity.  Advised him that if he continues to have pain and worsening swelling to that extremity to come back for further evaluation.  Will be discharged home in stable condition with close PCP follow-up today.      Diagnoses as of 06/10/24 0635   Fall, initial encounter       Social Determinants Limiting Care:  None identified    Disposition:   Discharge    Nitish Rodriguez MD  Emergency Medicine PGY2      Procedures ? Smart[employer] last updated 6/10/2024 6:35 AM        Nitish Rodriguez MD  Resident  06/10/24 0635    "

## 2024-06-17 ENCOUNTER — HOSPITAL ENCOUNTER (EMERGENCY)
Facility: HOSPITAL | Age: 44
Discharge: HOME | End: 2024-06-18
Attending: STUDENT IN AN ORGANIZED HEALTH CARE EDUCATION/TRAINING PROGRAM
Payer: COMMERCIAL

## 2024-06-17 ENCOUNTER — APPOINTMENT (OUTPATIENT)
Dept: RADIOLOGY | Facility: HOSPITAL | Age: 44
End: 2024-06-17
Payer: COMMERCIAL

## 2024-06-17 VITALS
TEMPERATURE: 98.6 F | OXYGEN SATURATION: 95 % | WEIGHT: 300 LBS | SYSTOLIC BLOOD PRESSURE: 130 MMHG | RESPIRATION RATE: 18 BRPM | HEIGHT: 68 IN | HEART RATE: 90 BPM | DIASTOLIC BLOOD PRESSURE: 87 MMHG | BODY MASS INDEX: 45.47 KG/M2

## 2024-06-17 DIAGNOSIS — M25.572 ACUTE LEFT ANKLE PAIN: Primary | ICD-10-CM

## 2024-06-17 PROCEDURE — 99283 EMERGENCY DEPT VISIT LOW MDM: CPT

## 2024-06-17 PROCEDURE — 2500000001 HC RX 250 WO HCPCS SELF ADMINISTERED DRUGS (ALT 637 FOR MEDICARE OP): Performed by: STUDENT IN AN ORGANIZED HEALTH CARE EDUCATION/TRAINING PROGRAM

## 2024-06-17 PROCEDURE — 73610 X-RAY EXAM OF ANKLE: CPT | Mod: LT

## 2024-06-17 PROCEDURE — 73610 X-RAY EXAM OF ANKLE: CPT | Mod: LEFT SIDE | Performed by: RADIOLOGY

## 2024-06-17 RX ORDER — IBUPROFEN 600 MG/1
600 TABLET ORAL EVERY 6 HOURS PRN
Qty: 40 TABLET | Refills: 0 | Status: SHIPPED | OUTPATIENT
Start: 2024-06-17 | End: 2024-07-01

## 2024-06-17 RX ORDER — IBUPROFEN 600 MG/1
600 TABLET ORAL EVERY 6 HOURS PRN
Qty: 40 TABLET | Refills: 0 | Status: SHIPPED | OUTPATIENT
Start: 2024-06-17 | End: 2024-06-17

## 2024-06-17 RX ORDER — ACETAMINOPHEN 325 MG/1
975 TABLET ORAL ONCE
Status: COMPLETED | OUTPATIENT
Start: 2024-06-17 | End: 2024-06-17

## 2024-06-17 RX ORDER — IBUPROFEN 400 MG/1
400 TABLET ORAL ONCE
Status: COMPLETED | OUTPATIENT
Start: 2024-06-17 | End: 2024-06-17

## 2024-06-17 RX ORDER — ACETAMINOPHEN 500 MG
1000 TABLET ORAL EVERY 6 HOURS PRN
Qty: 30 TABLET | Refills: 0 | Status: SHIPPED | OUTPATIENT
Start: 2024-06-17 | End: 2024-06-27

## 2024-06-17 RX ORDER — ACETAMINOPHEN 500 MG
1000 TABLET ORAL EVERY 6 HOURS PRN
Qty: 30 TABLET | Refills: 0 | Status: SHIPPED | OUTPATIENT
Start: 2024-06-17 | End: 2024-06-17

## 2024-06-17 ASSESSMENT — LIFESTYLE VARIABLES
HAVE YOU EVER FELT YOU SHOULD CUT DOWN ON YOUR DRINKING: NO
EVER HAD A DRINK FIRST THING IN THE MORNING TO STEADY YOUR NERVES TO GET RID OF A HANGOVER: NO
HAVE PEOPLE ANNOYED YOU BY CRITICIZING YOUR DRINKING: NO
TOTAL SCORE: 0
EVER FELT BAD OR GUILTY ABOUT YOUR DRINKING: NO

## 2024-06-17 ASSESSMENT — PAIN SCALES - GENERAL: PAINLEVEL_OUTOF10: 6

## 2024-06-17 ASSESSMENT — PAIN DESCRIPTION - LOCATION: LOCATION: ANKLE

## 2024-06-17 ASSESSMENT — PAIN DESCRIPTION - ORIENTATION: ORIENTATION: LEFT

## 2024-06-17 NOTE — ED PROVIDER NOTES
CC: Ankle Pain     HPI:  Anibal Caro is a 44 y.o. male  With a past medical history of hallucinations, obesity, schizoaffective disorder, T2DM, housing insecurity, presenting to the emergency department today due to left ankle pain.  Patient states that his pain has been present for the past few days however today it is worse.  He states that he does not have any medications to take close has not taken anything yet today.  He states that making it difficult for him to ambulate.  Denies any falls, traumas, or other injuries that may have caused this pain.  Denies any other concerns at this time.  Denies any fevers, chills, nausea, vomiting, diarrhea, chest pain, shortness of breath.    Limitations to History: none  Additional History provided by: N/A    External Records Reviewed:  Recent available ED and inpatient notes reviewed in EMR.    PMHx/PSHx:  Per HPI.   -has Sprain of left ankle, initial encounter; Passive suicidal ideations; Homelessness; and Schizophrenia (Multi) on their problem list.    - has a past surgical history that includes CT angio aorta and bilateral iliofemoral runoff w and or wo IV contrast (11/3/2022).    Medications:  Reviewed in EMR. See EMR for complete list of medications and doses.    Allergies:  Amoxicillin, Bee pollen, and Grass pollen    Social History:  - Tobacco:  reports that he has never smoked. He has never used smokeless tobacco.   - Alcohol:  reports no history of alcohol use.   - Illicit Drugs:  has no history on file for drug use.     ROS:  Per HPI.     ???????????????????????????????????????????????????????????????  Triage Vitals:  T 37 °C (98.6 °F)  HR (!) 110  /68  RR 18  O2 94 %      Physical Exam  Vitals and nursing note reviewed.   Constitutional:       General: He is not in acute distress.     Appearance: He is well-developed.   HENT:      Head: Normocephalic and atraumatic.   Eyes:      Conjunctiva/sclera: Conjunctivae normal.   Cardiovascular:      Rate  and Rhythm: Normal rate and regular rhythm.      Heart sounds: No murmur heard.  Pulmonary:      Effort: Pulmonary effort is normal. No respiratory distress.      Breath sounds: Normal breath sounds.   Abdominal:      Palpations: Abdomen is soft.      Tenderness: There is no abdominal tenderness.   Musculoskeletal:      Cervical back: Neck supple.      Comments: No obvious deformity to upper extremities and right lower extremity, left lower extremity with some swelling over the medial malleolus and tenderness to the area, full range of motion of the ankle with mild discomfort, 2+ distal pulses, neurovascularly intact distally.   Skin:     General: Skin is warm and dry.      Capillary Refill: Capillary refill takes less than 2 seconds.   Neurological:      Mental Status: He is alert.   Psychiatric:         Mood and Affect: Mood normal.       ???????????????????????????????????????????????????????????????  ED Course:  ED Course as of 06/19/24 1534   Mon Jun 17, 2024   0910 Xray ankle:  Left ankle soft tissue swelling.  Moderate osteoarthritis left ankle joint.  No acute fracture visualized.   [AT]      ED Course User Index  [AT] Rosa Bridges MD         Diagnoses as of 06/19/24 1534   Acute left ankle pain       EKG & Images:  Independently reviewed, See ED Course      MDM:  -The patient is a 44-year-old male presenting to the emergency department today due to left ankle pain.  Patient does have some medial malleolus tenderness.  He denies any trauma to the area to explain the pain, swelling, difficulty ambulating.  On exam, there is tenderness as well as swelling over the medial aspect of the ankle.  As such, will obtain x-ray to rule out any fracture.  Patient was treated with Tylenol and ibuprofen for his pain.  After appropriate pain control, patient's vital signs have improved.  At approximately 7 AM, the patient was signed off to the oncoming provider pending x-ray of the ankle.  Please see the oncoming providers  note for further details.    Final diagnoses:   None         Social Determinants Limiting Care:  Difficulty paying for/obtaining medications, Housing insecurity, and Poor health literacy    Disposition:  Handoff    Krystal Temple MD   Emergency Medicine Resident, PGY3  [facility]     Disclaimer: This note was dictated by speech recognition. Minor errors in transcription may be present    Procedures ? [employer] last updated 6/17/2024 7:30 AM        Krystal Temple MD  Resident  06/19/24 4251

## 2024-06-17 NOTE — DISCHARGE INSTRUCTIONS
.Please follow up with your primary care doctor.    Return if you have:  Temperature greater than 100.4  Severe uncontrolled pain, or worsened pain  New numbness, tingling, sensory changes, difficulty walking  Signs of infection: pus draining warmth redness  Any other questions or concerns you may have after discharge    In an emergency, call 911 or go to an Emergency Department at a nearby hospital

## 2024-06-17 NOTE — PROGRESS NOTES
Anibal Caro is a 44 y.o. male. Patient is medically and psychiatrically ready at this time. Patient is very familiar to this writer.    Patient is outside waiting for SW for assistance with SDOH. SW to offer patient bus pass or a ride home if patient refers. SW will update if transport and other assistance is coordinated.       06/17/24 1639   Discharge Planning   Assistance Needed SDOH / Request for SW   Patient expects to be discharged to: Home if desired - [id number] [facility] [id number]   Does the patient need discharge transport arranged? Yes  (or bus pass offered)   RoundTrip coordination needed? Yes   Has discharge transport been arranged? No   What day is the transport expected? 06/17/24   What time is the transport expected? 8509

## 2024-06-17 NOTE — PROGRESS NOTES
"Anibal Caro is a 44 y.o. male on day 0 of admission presenting with No Principal Problem: There is no principal problem currently on the Problem List. Please update the Problem List and refresh..        Last Recorded Vitals  Blood pressure 130/87, pulse 90, temperature 37 °C (98.6 °F), resp. rate 18, height 1.727 m (5' 8\"), weight 136 kg (300 lb), SpO2 95%.  Intake/Output last 3 Shifts:  No intake/output data recorded.      Assessment/Plan   Active Problems:  There are no active Hospital Problems.    Patient was handed off to me from the previous team. For full history, physical, and prior ED course, please see previous provider note prior to patient handoff. This is an addendum to the record.    Briefly, this is a 44-year-old here for left ankle pain, was given p.o. pain medications.  Ankle x-ray shows left ankle soft tissue swelling.  Moderate osteoarthritis left ankle joint. Patient vitals reassessed and tachycardia resolved. No signs of infection, or concern for sepsis, osteomyelitis. No acute fracture visualized on x ray. Patient able to ambulate. Patient was reassessed and reports improved pain. Reports he lives in an independent group home. Patient was discharged with return precautions, tylenol and ibuprofen and given sports med followup. Patient was given walker and able to walk without difficulty.     Throughout the ED stay, the patient was monitored and re-examined for any changes in stability or symptomatology. The patient was instructed to return to the ED if any symptoms recurred, worsened, or if there was any additional concerns.    Rosa Bridges MD  Emergency Medicine PGY-1     Rosa Bridges MD      "

## 2024-06-17 NOTE — ED TRIAGE NOTES
Pt to ED c/o left ankle pain for the last 4 months. Pt also c/o right shin pain, states he feels like his legs are going to break when he walks. Pt states he is able to walk, just painful. Pt denies any obvious injury or trauma to leg.

## 2024-06-23 ENCOUNTER — HOSPITAL ENCOUNTER (EMERGENCY)
Facility: HOSPITAL | Age: 44
Discharge: HOME | End: 2024-06-23
Payer: COMMERCIAL

## 2024-06-23 VITALS
BODY MASS INDEX: 45.47 KG/M2 | HEART RATE: 80 BPM | WEIGHT: 300 LBS | TEMPERATURE: 98.6 F | DIASTOLIC BLOOD PRESSURE: 99 MMHG | RESPIRATION RATE: 20 BRPM | HEIGHT: 68 IN | SYSTOLIC BLOOD PRESSURE: 147 MMHG

## 2024-06-23 PROCEDURE — 4500999001 HC ED NO CHARGE

## 2024-06-23 ASSESSMENT — COLUMBIA-SUICIDE SEVERITY RATING SCALE - C-SSRS
2. HAVE YOU ACTUALLY HAD ANY THOUGHTS OF KILLING YOURSELF?: NO
6. HAVE YOU EVER DONE ANYTHING, STARTED TO DO ANYTHING, OR PREPARED TO DO ANYTHING TO END YOUR LIFE?: NO
6. HAVE YOU EVER DONE ANYTHING, STARTED TO DO ANYTHING, OR PREPARED TO DO ANYTHING TO END YOUR LIFE?: YES
1. IN THE PAST MONTH, HAVE YOU WISHED YOU WERE DEAD OR WISHED YOU COULD GO TO SLEEP AND NOT WAKE UP?: NO

## 2024-06-23 ASSESSMENT — PAIN SCALES - GENERAL: PAINLEVEL_OUTOF10: 10 - WORST POSSIBLE PAIN

## 2024-06-23 ASSESSMENT — PAIN - FUNCTIONAL ASSESSMENT: PAIN_FUNCTIONAL_ASSESSMENT: 0-10

## 2024-06-23 ASSESSMENT — PAIN DESCRIPTION - PAIN TYPE: TYPE: ACUTE PAIN

## 2024-06-24 ENCOUNTER — HOSPITAL ENCOUNTER (EMERGENCY)
Facility: HOSPITAL | Age: 44
Discharge: HOME | End: 2024-06-24
Payer: COMMERCIAL

## 2024-06-24 ENCOUNTER — CLINICAL SUPPORT (OUTPATIENT)
Dept: EMERGENCY MEDICINE | Facility: HOSPITAL | Age: 44
End: 2024-06-24
Payer: COMMERCIAL

## 2024-06-24 ENCOUNTER — HOSPITAL ENCOUNTER (EMERGENCY)
Facility: HOSPITAL | Age: 44
Discharge: HOME | End: 2024-06-25
Attending: EMERGENCY MEDICINE
Payer: COMMERCIAL

## 2024-06-24 VITALS
SYSTOLIC BLOOD PRESSURE: 145 MMHG | OXYGEN SATURATION: 100 % | DIASTOLIC BLOOD PRESSURE: 101 MMHG | WEIGHT: 300 LBS | RESPIRATION RATE: 16 BRPM | HEIGHT: 68 IN | HEART RATE: 77 BPM | TEMPERATURE: 97.2 F | BODY MASS INDEX: 45.47 KG/M2

## 2024-06-24 DIAGNOSIS — F25.9 SCHIZOAFFECTIVE DISORDER, UNSPECIFIED TYPE (MULTI): Primary | ICD-10-CM

## 2024-06-24 LAB
ALBUMIN SERPL BCP-MCNC: 3.3 G/DL (ref 3.4–5)
ALP SERPL-CCNC: 73 U/L (ref 33–120)
ALT SERPL W P-5'-P-CCNC: 12 U/L (ref 10–52)
AMPHETAMINES UR QL SCN: ABNORMAL
ANION GAP SERPL CALC-SCNC: 11 MMOL/L (ref 10–20)
APAP SERPL-MCNC: <10 UG/ML
AST SERPL W P-5'-P-CCNC: 11 U/L (ref 9–39)
BARBITURATES UR QL SCN: ABNORMAL
BASOPHILS # BLD AUTO: 0.05 X10*3/UL (ref 0–0.1)
BASOPHILS NFR BLD AUTO: 1 %
BENZODIAZ UR QL SCN: ABNORMAL
BILIRUB SERPL-MCNC: 0.2 MG/DL (ref 0–1.2)
BUN SERPL-MCNC: 17 MG/DL (ref 6–23)
BZE UR QL SCN: ABNORMAL
CALCIUM SERPL-MCNC: 8.8 MG/DL (ref 8.6–10.6)
CANNABINOIDS UR QL SCN: ABNORMAL
CHLORIDE SERPL-SCNC: 107 MMOL/L (ref 98–107)
CO2 SERPL-SCNC: 27 MMOL/L (ref 21–32)
CREAT SERPL-MCNC: 1.03 MG/DL (ref 0.5–1.3)
EGFRCR SERPLBLD CKD-EPI 2021: >90 ML/MIN/1.73M*2
EOSINOPHIL # BLD AUTO: 0.17 X10*3/UL (ref 0–0.7)
EOSINOPHIL NFR BLD AUTO: 3.4 %
ERYTHROCYTE [DISTWIDTH] IN BLOOD BY AUTOMATED COUNT: 15.4 % (ref 11.5–14.5)
ETHANOL SERPL-MCNC: <10 MG/DL
FENTANYL+NORFENTANYL UR QL SCN: ABNORMAL
GLUCOSE SERPL-MCNC: 98 MG/DL (ref 74–99)
HCT VFR BLD AUTO: 41.3 % (ref 41–52)
HGB BLD-MCNC: 13.9 G/DL (ref 13.5–17.5)
HOLD SPECIMEN: NORMAL
HOLD SPECIMEN: NORMAL
IMM GRANULOCYTES # BLD AUTO: 0.01 X10*3/UL (ref 0–0.7)
IMM GRANULOCYTES NFR BLD AUTO: 0.2 % (ref 0–0.9)
LYMPHOCYTES # BLD AUTO: 1.95 X10*3/UL (ref 1.2–4.8)
LYMPHOCYTES NFR BLD AUTO: 39.4 %
MCH RBC QN AUTO: 27.6 PG (ref 26–34)
MCHC RBC AUTO-ENTMCNC: 33.7 G/DL (ref 32–36)
MCV RBC AUTO: 82 FL (ref 80–100)
METHADONE UR QL SCN: ABNORMAL
MONOCYTES # BLD AUTO: 0.6 X10*3/UL (ref 0.1–1)
MONOCYTES NFR BLD AUTO: 12.1 %
NEUTROPHILS # BLD AUTO: 2.17 X10*3/UL (ref 1.2–7.7)
NEUTROPHILS NFR BLD AUTO: 43.9 %
NRBC BLD-RTO: 0 /100 WBCS (ref 0–0)
OPIATES UR QL SCN: ABNORMAL
OXYCODONE+OXYMORPHONE UR QL SCN: ABNORMAL
PCP UR QL SCN: ABNORMAL
PLATELET # BLD AUTO: 298 X10*3/UL (ref 150–450)
POTASSIUM SERPL-SCNC: 4.1 MMOL/L (ref 3.5–5.3)
PROT SERPL-MCNC: 6.7 G/DL (ref 6.4–8.2)
RBC # BLD AUTO: 5.04 X10*6/UL (ref 4.5–5.9)
SALICYLATES SERPL-MCNC: <3 MG/DL
SARS-COV-2 RNA RESP QL NAA+PROBE: NOT DETECTED
SODIUM SERPL-SCNC: 141 MMOL/L (ref 136–145)
WBC # BLD AUTO: 5 X10*3/UL (ref 4.4–11.3)

## 2024-06-24 PROCEDURE — 80053 COMPREHEN METABOLIC PANEL: CPT | Performed by: PHYSICIAN ASSISTANT

## 2024-06-24 PROCEDURE — 80143 DRUG ASSAY ACETAMINOPHEN: CPT | Performed by: PHYSICIAN ASSISTANT

## 2024-06-24 PROCEDURE — 36415 COLL VENOUS BLD VENIPUNCTURE: CPT | Performed by: PHYSICIAN ASSISTANT

## 2024-06-24 PROCEDURE — 2500000001 HC RX 250 WO HCPCS SELF ADMINISTERED DRUGS (ALT 637 FOR MEDICARE OP): Performed by: PHYSICIAN ASSISTANT

## 2024-06-24 PROCEDURE — 80179 DRUG ASSAY SALICYLATE: CPT | Performed by: PHYSICIAN ASSISTANT

## 2024-06-24 PROCEDURE — 4500999001 HC ED NO CHARGE

## 2024-06-24 PROCEDURE — 99283 EMERGENCY DEPT VISIT LOW MDM: CPT

## 2024-06-24 PROCEDURE — 87635 SARS-COV-2 COVID-19 AMP PRB: CPT | Performed by: PHYSICIAN ASSISTANT

## 2024-06-24 PROCEDURE — 93005 ELECTROCARDIOGRAM TRACING: CPT

## 2024-06-24 PROCEDURE — 80307 DRUG TEST PRSMV CHEM ANLYZR: CPT | Performed by: PHYSICIAN ASSISTANT

## 2024-06-24 PROCEDURE — 85025 COMPLETE CBC W/AUTO DIFF WBC: CPT | Performed by: PHYSICIAN ASSISTANT

## 2024-06-24 PROCEDURE — 82947 ASSAY GLUCOSE BLOOD QUANT: CPT

## 2024-06-24 RX ORDER — BENZTROPINE MESYLATE 1 MG/1
1 TABLET ORAL ONCE
Status: COMPLETED | OUTPATIENT
Start: 2024-06-24 | End: 2024-06-24

## 2024-06-24 RX ADMIN — BENZTROPINE MESYLATE 1 MG: 1 TABLET ORAL at 17:27

## 2024-06-24 ASSESSMENT — LIFESTYLE VARIABLES
TOTAL SCORE: 0
HAVE YOU EVER FELT YOU SHOULD CUT DOWN ON YOUR DRINKING: NO
HAVE PEOPLE ANNOYED YOU BY CRITICIZING YOUR DRINKING: NO
EVER FELT BAD OR GUILTY ABOUT YOUR DRINKING: NO
EVER HAD A DRINK FIRST THING IN THE MORNING TO STEADY YOUR NERVES TO GET RID OF A HANGOVER: NO

## 2024-06-24 ASSESSMENT — PAIN SCALES - GENERAL
PAINLEVEL_OUTOF10: 0 - NO PAIN
PAINLEVEL_OUTOF10: 0 - NO PAIN

## 2024-06-24 ASSESSMENT — PAIN - FUNCTIONAL ASSESSMENT: PAIN_FUNCTIONAL_ASSESSMENT: 0-10

## 2024-06-24 ASSESSMENT — COLUMBIA-SUICIDE SEVERITY RATING SCALE - C-SSRS
1. IN THE PAST MONTH, HAVE YOU WISHED YOU WERE DEAD OR WISHED YOU COULD GO TO SLEEP AND NOT WAKE UP?: NO
6. HAVE YOU EVER DONE ANYTHING, STARTED TO DO ANYTHING, OR PREPARED TO DO ANYTHING TO END YOUR LIFE?: NO
2. HAVE YOU ACTUALLY HAD ANY THOUGHTS OF KILLING YOURSELF?: NO
2. HAVE YOU ACTUALLY HAD ANY THOUGHTS OF KILLING YOURSELF?: NO
6. HAVE YOU EVER DONE ANYTHING, STARTED TO DO ANYTHING, OR PREPARED TO DO ANYTHING TO END YOUR LIFE?: NO
2. HAVE YOU ACTUALLY HAD ANY THOUGHTS OF KILLING YOURSELF?: NO
6. HAVE YOU EVER DONE ANYTHING, STARTED TO DO ANYTHING, OR PREPARED TO DO ANYTHING TO END YOUR LIFE?: YES
1. SINCE LAST CONTACT, HAVE YOU WISHED YOU WERE DEAD OR WISHED YOU COULD GO TO SLEEP AND NOT WAKE UP?: NO
6. HAVE YOU EVER DONE ANYTHING, STARTED TO DO ANYTHING, OR PREPARED TO DO ANYTHING TO END YOUR LIFE?: NO
6. HAVE YOU EVER DONE ANYTHING, STARTED TO DO ANYTHING, OR PREPARED TO DO ANYTHING TO END YOUR LIFE?: NO
1. IN THE PAST MONTH, HAVE YOU WISHED YOU WERE DEAD OR WISHED YOU COULD GO TO SLEEP AND NOT WAKE UP?: NO

## 2024-06-24 NOTE — ED NOTES
Pt did not answer x2 when called for in lobby. Pt did not answer phone when called. LWBS after triage.      Smith Ervin RN  06/24/24 7674

## 2024-06-24 NOTE — ED TRIAGE NOTES
"Pt returns to the ED, after being seen at 0400 this morning, with c/o \"having schizophrenia.\" Denies SI/HI. States he's hearing non-specific auditory hallucinations. Pt states he needs his schizoprenia medications.   "

## 2024-06-24 NOTE — ED PROVIDER NOTES
"HPI   No chief complaint on file.      This is a 44-year-old male with a history of obesity, schizoaffective disorder, substance use disorder, hypertension, type 2 diabetes, housing insecurity who presents to the emergency department with concern for \"my schizophrenia acting up.\"  Endorses hearing voices that he is unable to understand.  He denies suicidal or homicidal ideations.  Denies auditory hallucinations.  States he is supposed to be on Cogentin but ran out and has not taken it for \"a while.\"  He does admit to crack cocaine use but does not recall when he last used it.  Denies any drug use today.               [place] Coma Scale Score: 15                     Patient History   Past Medical History:   Diagnosis Date    Acute (undifferentiated) schizophrenia (Multi)      Past Surgical History:   Procedure Laterality Date    CT AORTA AND BILATERAL ILIOFEMORAL RUNOFF ANGIOGRAM W AND/OR WO IV CONTRAST  11/3/2022    CT AORTA AND BILATERAL ILIOFEMORAL RUNOFF ANGIOGRAM W AND/OR WO IV CONTRAST 11/3/2022 [facility]     No family history on file.  Social History     Tobacco Use    Smoking status: Never    Smokeless tobacco: Never   Vaping Use    Vaping status: Never Used   Substance Use Topics    Alcohol use: Never    Drug use: Not on file       Physical Exam   ED Triage Vitals [06/24/24 1551]   Temperature Heart Rate Respirations BP   36.7 °C (98 °F) 96 18 135/88      Pulse Ox Temp Source Heart Rate Source Patient Position   96 % Oral Monitor Sitting      BP Location FiO2 (%)     Right arm 21 %       Physical Exam  Vitals reviewed.   Constitutional:       Appearance: He is obese.   HENT:      Head: Normocephalic and atraumatic.   Eyes:      Extraocular Movements: Extraocular movements intact.      Conjunctiva/sclera: Conjunctivae normal.   Cardiovascular:      Rate and Rhythm: Normal rate and regular rhythm.   Pulmonary:      Effort: Pulmonary effort is normal.   Abdominal:      Palpations: Abdomen is soft.    "   Tenderness: There is no abdominal tenderness.   Musculoskeletal:         General: Normal range of motion.   Neurological:      General: No focal deficit present.      Mental Status: He is alert.      Comments: Oriented to place and person, disoriented to time         ED Course & MDM   ED Course as of 06/24/24 1940 Mon Jun 24, 2024 1625 EKG was independently interpreted and showed a normal sinus rhythm at a rate of 99.  Intervals were within normal limits.  No ST segment elevations.  Normal axis [SUNG]      ED Course User Index  [SUNG] Shanna West MD         Diagnoses as of 06/24/24 1940   Schizoaffective disorder, unspecified type (Multi)       Medical Decision Making  44-year-old male, is alert and oriented to person, place, disoriented to time.  Appears disheveled and is foul-smelling.  The patient has no evidence of craniofacial trauma.  Is ambulating independently with a stable gait.    The patient was given his outpatient Cogentin.  Laboratory workup with no evidence of significant abnormalities including leukocytosis, electrolyte derangements, anemia.  Urine tox only positive for cocaine.     EKG performed, revealing normal sinus rhythm rate 99 bpm, normal axis, IA interval 128 ms, QTc 459 ms, no contiguous ST-T wave abnormalities noted.    EPAT was consulted, signed out to the incoming team pending EPAT recommendations.      Procedure  Procedures     Philip Tavera PA-C  06/24/24 2231

## 2024-06-24 NOTE — ED TRIAGE NOTES
"Pt reports he has a \"feeling in his head\" because he has a \"chemical imbalance\" and it's \"hard to think\"  "
No

## 2024-06-25 VITALS
OXYGEN SATURATION: 100 % | TEMPERATURE: 98.6 F | WEIGHT: 300 LBS | DIASTOLIC BLOOD PRESSURE: 75 MMHG | RESPIRATION RATE: 20 BRPM | SYSTOLIC BLOOD PRESSURE: 132 MMHG | BODY MASS INDEX: 45.61 KG/M2 | HEART RATE: 70 BPM

## 2024-06-25 LAB
ATRIAL RATE: 99 BPM
GLUCOSE BLD MANUAL STRIP-MCNC: 98 MG/DL (ref 74–99)
P AXIS: 48 DEGREES
P OFFSET: 208 MS
P ONSET: 155 MS
PR INTERVAL: 128 MS
Q ONSET: 219 MS
QRS COUNT: 17 BEATS
QRS DURATION: 88 MS
QT INTERVAL: 358 MS
QTC CALCULATION(BAZETT): 459 MS
QTC FREDERICIA: 423 MS
R AXIS: 69 DEGREES
T AXIS: -2 DEGREES
T OFFSET: 398 MS
VENTRICULAR RATE: 99 BPM

## 2024-06-25 SDOH — HEALTH STABILITY: MENTAL HEALTH: ANXIETY SYMPTOMS: NO PROBLEMS REPORTED OR OBSERVED.

## 2024-06-25 SDOH — ECONOMIC STABILITY: GENERAL: FINANCIAL CONCERNS: UNABLE TO MEET BASIC NEEDS

## 2024-06-25 SDOH — HEALTH STABILITY: MENTAL HEALTH: SUICIDAL BEHAVIOR (LIFETIME): NO

## 2024-06-25 SDOH — HEALTH STABILITY: MENTAL HEALTH: NON-SPECIFIC ACTIVE SUICIDAL THOUGHTS (PAST 1 MONTH): NO

## 2024-06-25 SDOH — HEALTH STABILITY: MENTAL HEALTH: WISH TO BE DEAD (PAST 1 MONTH): NO

## 2024-06-25 SDOH — HEALTH STABILITY: MENTAL HEALTH: DEPRESSION SYMPTOMS: NO PROBLEMS REPORTED OR OBSERVED.

## 2024-06-25 SDOH — ECONOMIC STABILITY: HOUSING INSECURITY: FEELS SAFE LIVING IN HOME: YES

## 2024-06-25 SDOH — HEALTH STABILITY: MENTAL HEALTH: IN THE PAST WEEK, HAVE YOU BEEN HAVING THOUGHTS ABOUT KILLING YOURSELF?: NO

## 2024-06-25 SDOH — HEALTH STABILITY: MENTAL HEALTH: HAVE YOU EVER TRIED TO KILL YOURSELF?: NO

## 2024-06-25 SDOH — HEALTH STABILITY: MENTAL HEALTH: SUICIDAL BEHAVIOR (3 MONTHS): NO

## 2024-06-25 SDOH — HEALTH STABILITY: MENTAL HEALTH: IN THE PAST FEW WEEKS, HAVE YOU FELT THAT YOU OR YOUR FAMILY WOULD BE BETTER OFF IF YOU WERE DEAD?: NO

## 2024-06-25 SDOH — HEALTH STABILITY: MENTAL HEALTH: ARE YOU HAVING THOUGHTS OF KILLING YOURSELF RIGHT NOW?: NO

## 2024-06-25 SDOH — HEALTH STABILITY: MENTAL HEALTH: IN THE PAST FEW WEEKS, HAVE YOU WISHED YOU WERE DEAD?: NO

## 2024-06-25 ASSESSMENT — LIFESTYLE VARIABLES
PRESCIPTION_ABUSE_PAST_12_MONTHS: NO
SUBSTANCE_ABUSE_PAST_12_MONTHS: YES

## 2024-06-25 ASSESSMENT — PAIN SCALES - GENERAL: PAINLEVEL_OUTOF10: 0 - NO PAIN

## 2024-06-25 NOTE — PROGRESS NOTES
EPAT - Social Work Psychiatric Assessment    Arrival Details  Mode of Arrival: Ambulatory  Admission Source: Home  Admission Type: Voluntary  EPAT Assessment Start Date: 06/25/24  EPAT Assessment Start Time: 0815  Name of : [facility]    History of Present Illness  Admission Reason: Psychiatric Evaluation  HPI: Patient, Anibal Caro, is a 44 year old male with history of schizoaffective disorder and substance use disorder. Patient presented to ED with complaint of psychiatric evaluation. Patient reported concern for “my schizophrenia is acting up”. Patient reportedly endorsing auditory hallucinations of unintelligible voices. Patient denied suicidal ideation and homicidal ideation with ED provider. Patient reported recent crack cocaine use but unsure when last use was. Patient denied any drug use prior to ED visit. Patient reported running out of Cogentin medication and not being complaint for “a while”.     Patient’s chart, community record, provider note, triage note, labs, and C-SSRS score reviewed. Patient’s chart shows history of mental health diagnoses, psychiatric evaluations, and inpatient psychiatric hospitalizations. Patient’s most recent inpatient psychiatric hospitalization noted at [facility] in May of 2024. Patient’s C-SSRS scored at “moderate risk” in triage and later changed to “no risk” with more time lapsed in ED.     Patient declined collateral contact during assessment.    SW Readmission Information   Readmission within 30 Days: Yes  Previous ED Visit Date and Reason : 06/24/2024, Physical pain  Previous Discharge Date and Location: 06/24/2024, [facility] [place] [facility]  Factors Contributing to  Readmission Inpatient/ED (Team Perspective): Homeless, Substance Abuse, Failed to Keep Follow-Up Appointments  Readmission Factors Team Comments: Housing insecurity.  Factors Contributing to Readmission (Patient/Family Perspective): Housing insecurity    Psychiatric Symptoms  Anxiety Symptoms: No problems  reported or observed.  Depression Symptoms: No problems reported or observed.  [place] Symptoms: Poor judgment    Psychosis Symptoms  Hallucination Type: Auditory  Delusion Type: No problems reported or observed.    Additional Symptoms - Adult  Generalized Anxiety Disorder: No problems reported or observed.  Obsessive Compulsive Disorder: No problems reported or observed.  Panic Attack: No problems reported or observed.  Post Traumatic Stress Disorder: No problems reported or observed.  Delirium: No problems reported or observed.  Review of Symptoms Comments: Patient reported experiencing auditory hallucinations with no ability to determine nature of voices. Patient denied hallucinations during EPAT evaluation. Patient denied active suicidal ideation and history of attempts. Patient denied homicidal ideation. Patient reported no recent chanage to appetite, sleep habits, and mood.    Past Psychiatric History/Meds/Treatments  Past Psychiatric History: Patient has history of schzio  Past Psychiatric Meds/Treatments: Patient recieved Haldol YE on 06/19/2024 at [facility] ED. Patient reported to ED provider no use of cogentin in recent weeks. It remains unclear if patient prescribed additional medications other than YE. Patient has history of inpatient psychiatric hospitalizations with most recent noted in May of 2024 at [facility].  Past Violence/Victimization History: Unreported    Current Mental Health Contacts   Name/Phone Number: Unreported, history at [facility], per chart review.   Last Appointment Date: Unreported  Provider Name/Phone Number: Dr. Clifton ([place]?)  Provider Last Appointment Date: Unreported    Support System: Community    Living Arrangement: Homeless    Home Safety  Feels Safe Living in Home: Yes  Potentially Unsafe Housing Conditions: Unable to Assess  Home Safety : Patient reported feeling safe with living situation but did not provide details into current housing situation.    Income  Information  Employment Status for: Patient  Employment Status: Unemployed  Income Source: Disability  Current/Previous Occupation: Unable to Assess  Shift Worked:  (Unreported)  Income/Expense Information: Other (Comment) (Did not assess)  Financial Concerns: Unable to Meet Basic Needs  Who Manages Finances if Patient Unable: Unreported  Employment/ Finance Comments: Patient did not discuss current employment.    [place] Service/Education History  Current or Previous  Service: None   Experience: Other (Comment) (Unreported)  Education Level: Other (Comment) (Did not assess)  History of Learning Problems:  (Did not assess)  History of School Behavior Problems:  (Did not assess)  School History: Patient did not discuss school history.    Social/Cultural History  Social History: Patient is a 44 year old  male with brown skin, gray hair, wearing hospital gear. Patient appeared poorly groomed and older than stated age.  Cultural Requests During Hospitalization: Unreported  Spiritual Requests During Hospitalization: Unreported  Important Activities: Social    Legal  Legal Considerations: Patient/ Family Ability to Make Healthcare Decisions  Assistance with Managing/Advocating Healthcare Needs: Other (Comment) (Unreported)  Criminal Activity/ Legal Involvement Pertinent to Current Situation/ Hospitalization: Unreported  Legal Concerns: Unreported  Legal Comments: Unreported    Drug Screening  Have you used any substances (canabis, cocaine, heroin, hallucinogens, inhalants, etc.) in the past 12 months?: Yes  Have you used any prescription drugs other than prescribed in the past 12 months?: No  Is a toxicology screen needed?: Yes    Stage of Change  Stage of Change: Precontemplation  History of Treatment: Inpatient  Type of Treatment Offered: AA/NA meeting resource  Treatment Offered: Declined  Duration of Substance Use: Unreported  Frequency of Substance Use: Daily  Age of First Substance  Use: Unreported    Psychosocial  Psychosocial (WDL): Exceptions to WDL  Behaviors/Mood: Calm, Cooperative, Guarded  Affect: Appropriate to circumstances  Parent/Guardian/Significant Other Involvement: No involvement  Family Behaviors: Unable to assess  Visitor Behaviors: Unable to assess  Needs Expressed: Denies  Emotional Support Given: Reassure    Orientation  Orientation Level: Oriented X4    General Appearance  Motor Activity: Unremarkable  Speech Pattern: Other (Comment) (Unremarkable)  General Attitude: Cooperative  Appearance/Hygiene: Body odor, Disheveled, Poor hygiene    Thought Process  Coherency: Circumstantial  Content: Unremarkable  Delusions: Controlled  Perception: Hallucinations  Hallucination: Auditory  Judgment/Insight: Limited  Confusion: None  Cognition: Appropriate judgement, Follows commands    Sleep Pattern  Sleep Pattern: Unable to assess    Risk Factors  Self Harm/Suicidal Ideation Plan: Patient denied active suicidal ideation.  Previous Self Harm/Suicidal Plans: Unreported  Risk Factors: Lower socioeconomic status, Major mental illness, Male, Substance abuse  Description of Thoughts/Ideas Leaving Unit Now: Patient reported desire to have a ginger ale and wanted to go home.    Violence Risk Assessment  Assessment of Violence: None noted  Thoughts of Harm to Others: No    Ability to Assess Risk Screen  Risk Screen - Ability to Assess: Unable to assess  Ask Suicide-Screening Questions  1. In the past few weeks, have you wished you were dead?: No  2. In the past few weeks, have you felt that you or your family would be better off if you were dead?: No  3. In the past week, have you been having thoughts about killing yourself?: No  4. Have you ever tried to kill yourself?: No  5. Are you having thoughts of killing yourself right now?: No  Calculated Risk Score: No intervention is necessary  [place] Suicide Severity Rating Scale (Screener/Recent Self-Report)  1. Wish to be Dead (Past 1 Month):  No  2. Non-Specific Active Suicidal Thoughts (Past 1 Month): No  6. Suicidal Behavior (Lifetime): No  6. Suicidal Behavior (3 Months): No  Calculated C-SSRS Risk Score (Lifetime/Recent): No Risk Indicated  Step 1: Risk Factors  Current & Past Psychiatric Dx: Mood disorder, Psychotic disorder, Alcohol/substance abuse disorders  Presenting Symptoms: Psychosis  Family History: Other (Comment) (Unreported)  Precipitants/Stressors: Substance intoxication or withdrawal, Pending incarceration or homelessness  Change in Treatment: Other (Comment) (No recent changes reported.)  Access to Lethal Methods : No  Step 2: Protective Factors   Protective Factors Internal: Frustration tolerance  Protective Factors External: Supportive social network or family or friends, Positive therapeutic relationships  Step 3: Suicidal Ideation Intensity  Most Severe Suicidal Ideation Identified: Patient denied active suicidal ideation and history of attempts.  How Many Times Have You Had These Thoughts: Less than once a week  When You Have the Thoughts How Long do They Last : Fleeting - few seconds or minutes  Could/Can You Stop Thinking About Killing Yourself or Wanting to Die if You Want to: Easily able to control thoughts  Are There Things - Anyone or Anything - That Stopped You From Wanting to Die or Acting on: Deterrents definitely stopped you from attempting suicide  What Sort of Reasons Did You Have For Thinking About Wanting to Die or Killing Yourself: Does not apply  Total Score: 4  Step 5: Documentation  Risk Level: Low suicide risk    Psychiatric Impression and Plan of Care  Assessment and Plan: Patient, Anibal Caro, is a 44 year old male with history of schizoaffective disorder and substance use disorder. Patient presented to ED with complaint of psychiatric evaluation. Patient reported initial concern for “my schizophrenia is acting up”. When asked by EPAT reason for ED visit patient reported “my leg hurt”. Patient denied any  acute changes to mental health functioning during EPAT evaluation. Patient reported no active suicidal ideation and no history of attempts. Patient’s C-SSRS scored at low risk due to no active ideation reported. Patient denied homicidal ideation. Patient denied experiencing hallucinations during EPAT evaluation. Patient had reported experiencing auditory hallucinations of unintelligible voices to ED provider initially. Patient denied any acute changes to appetite, sleeping, or mood recently. Patient asked about housing situation and reported feeling safe. Patient did not provide any additional details on current housing environment. Per chart history patient has history of housing insecurity. Patient’s chart shows frequent ED visits with similar complaint of mental health exacerbation or physical pain issues. Patient reported having outpatient follow up with a Dr. Clifton ([place]). Patient unsure of last contact with outpatient provider. Per chart review, patient received YE medication from [facility] on 06/19/2024. Patient discussed desire to get a Ginger Ale from the ED and go home when asked how ED can help patient today. Patient did not discuss substance use and declined resources from [facility] when offered. Patient appeared disheveled and malodorous during evaluation but pleasantly engaged. Patient does not currently meet criteria for inpatient psychiatric hospitalization due to low risk of harm to self, low risk of harm to others, and limited disability related to mental health diagnoses. Patient recommended to follow up with current outpatient providers, call 9-1-1, call crisis hotline, and return to ED if symptoms worsen. Patient recommended for discharge. Plan for care discussed with and approved by Dr. Najera.  Specific Resources Provided to Patient: Patient recommended to follow up with current outpatient providers, call 9-1-1, call crisis hotline, and return to ED if symptoms worsen.  CM Notified: -  PHP/IOP  Recommended: Not at this time  Specific Information Provided for PHP/IOP: None at this time  Plan Comments: Diagnosis: Schizophrenia and substance use disorder    Outcome/Disposition  Patient's Perception of Outcome Achieved: Accepting  Assessment, Recommendations and Risk Level Reviewed with: Dr. Najera  Contact Name: Semaj Caro  Contact Number(s): [phone]  Contact Relationship: Relative  EPAT Assessment Completed Date: 06/25/24  EPAT Assessment Completed Time: 1012  Patient Disposition: Home

## 2024-06-25 NOTE — PROGRESS NOTES
Emergency Medicine Transition of Care Note.    I received Anibal Caro in signout from previous provider. Please see the previous ED provider note for all HPI, PE and MDM up to the time of sign-out. This is in addition to the primary record.    ED Course as of 06/24/24 2106 Mon Jun 24, 2024   1625 EKG was independently interpreted and showed a normal sinus rhythm at a rate of 99.  Intervals were within normal limits.  No ST segment elevations.  Normal axis [SUNG]      ED Course User Index  [SUNG] Shanna West MD         Diagnoses as of 06/24/24 2106   Schizoaffective disorder, unspecified type (Multi)     Medical Decision Making    Final diagnoses:   [F25.9] Schizoaffective disorder, unspecified type (Multi)     At the time of sign out, vital signs were as follows:  Vitals:    06/24/24 1714   BP: (!) 155/99   Pulse: 87   Resp: 16   Temp:    SpO2: 96%     In brief Anibal Caro is an 44 y.o. male presenting for psychiatric evaluation for schizoaffective disorder. Patient was stable throughout my shift and did not require sedation during my shift. Patient was awaiting on EPAT to see.     Dispo: sign out    Pt seen and discussed with ED attending    Shanna West MD  Emergency Medicine PGY-2

## 2024-06-25 NOTE — DISCHARGE INSTRUCTIONS
You were seen in the emergency department today due to being out of cogentin. Your labs and imaging did not show any concerning findings today and you will be discharged home.  You were switched to a long-acting injection of Cogentin, which you got on 6/19/2024.  You should follow-up with your primary care provider in 1 to 2 weeks if able.  Please return to the emergency department if you begin experiencing any worsening symptoms.

## 2024-06-25 NOTE — PROGRESS NOTES
I received Anibal Caro in signout from Dr. Seals.  Please see the previous note for all HPI, PE and MDM up to the time of signout at 0700.  This is in addition to the primary documentation.    In brief Anibal Caro is an 44 y.o. male with a history of schizoaffective disorder,, STD, HTN, T2DM, housing insecurity who presented to ED with chief complaint of increased schizophrenia symptoms and reported he has been out of his Cogentin for about 1 month.    At the time of signout we were awaiting: EPAT evaluation    MDM:  ED Course as of 06/25/24 0856   Mon Jun 24, 2024   1625 EKG was independently interpreted and showed a normal sinus rhythm at a rate of 99.  Intervals were within normal limits.  No ST segment elevations.  Normal axis [SUNG]      ED Course User Index  [SUNG] Shanna West MD         Diagnoses as of 06/25/24 0856   Schizoaffective disorder, unspecified type (Multi)     Patient was seen by EPAT.  EPAT advised the patient has been transitioned to long-acting injectable medication, which he received at [facility] on 6/19/2024.  He is no longer on daily PO Cogentin.  He has no acute psychiatric concerns, reports no SI, HI, or hallucinations.      I discussed with the patient, and recommended that he follow-up with his primary care provider for further management.  He reports no additional concerns at this time and is hemodynamically stable, so will be discharged home.  Reviewed strict return precautions including worsening symptoms or new concerns.    Assessment and plan discussed with Dr. Giang    Disposition: Discharge    Namrata Najera MD   Emergency Medicine, PGY-1

## 2024-06-25 NOTE — SIGNIFICANT EVENT
Attempted to see Mr. Caro for EPAT evaluation. Mr. Caro was sedated and unable to engage in the interview despite multiple attempts. He briefly mentioned he used cocaine yesterday. He denied suicidal and homicidal ideation. He said he was in the ED because he felt a demon tugging on his arm. EPAT will re-attempt evaluation when the patient is more awake.     Celeste Da Silva MD

## 2024-06-27 ENCOUNTER — HOSPITAL ENCOUNTER (EMERGENCY)
Facility: HOSPITAL | Age: 44
Discharge: HOME | End: 2024-06-27
Payer: COMMERCIAL

## 2024-06-27 VITALS
RESPIRATION RATE: 16 BRPM | TEMPERATURE: 98.4 F | SYSTOLIC BLOOD PRESSURE: 127 MMHG | DIASTOLIC BLOOD PRESSURE: 76 MMHG | HEART RATE: 83 BPM | BODY MASS INDEX: 47.74 KG/M2 | OXYGEN SATURATION: 98 % | HEIGHT: 68 IN | WEIGHT: 315 LBS

## 2024-06-27 DIAGNOSIS — Z59.00 HOMELESS: Primary | ICD-10-CM

## 2024-06-27 DIAGNOSIS — T73.0XXA HUNGRY, INITIAL ENCOUNTER: ICD-10-CM

## 2024-06-27 LAB — GLUCOSE BLD MANUAL STRIP-MCNC: 113 MG/DL (ref 74–99)

## 2024-06-27 PROCEDURE — 82947 ASSAY GLUCOSE BLOOD QUANT: CPT

## 2024-06-27 PROCEDURE — 99283 EMERGENCY DEPT VISIT LOW MDM: CPT | Performed by: PHYSICIAN ASSISTANT

## 2024-06-27 PROCEDURE — 99283 EMERGENCY DEPT VISIT LOW MDM: CPT

## 2024-06-27 SDOH — ECONOMIC STABILITY - HOUSING INSECURITY: HOMELESSNESS UNSPECIFIED: Z59.00

## 2024-06-27 NOTE — ED PROVIDER NOTES
"This is a 44-year-old with a past medical history of schizoaffective disorder, obesity, substance use disorder, hypertension, and diabetes who presents to the ED via [facility] for \"heat exhaustion\".  Per EMS a bystander called because the patient was slouched back in his wheelchair on the side of the road.  When EMS arrived patient was easily arousable.  He tells them that he was feeling very hot from being outside all day.  He denies any chest pain, shortness of breath, lightheadedness, abdominal pain, nausea, vomiting, or diarrhea.  He states that he has not had anything to eat or drink today.  He denies any SI, HI, visual or auditory hallucinations.  Patient is well-known to the emergency department and was seen here earlier this week by our psychiatric team and was cleared at that time.  Patient is requesting ginger ale and food.  Of note the current temperature outside is 70 degrees and cloudy.      History provided by:  Patient   used: No             Visit Vitals  /76   Pulse 83   Temp 36.9 °C (98.4 °F) (Temporal)   Resp 16   Ht 1.727 m (5' 8\")   Wt (!) 181 kg (400 lb)   SpO2 98%   BMI 60.82 kg/m²   Smoking Status Never   BSA 2.95 m²          Physical Exam     Physical exam:   General: Vitals noted, no distress. Afebrile.  Calm and cooperative.  Laying on cot.  EENT:  Hearing grossly intact. Normal phonation. MMM. Airway patient. PERRL. EOMI.   Neck: No midline tenderness or paraspinal tenderness. FROM.   Cardiac: Regular, rate, rhythm. Normal S1 and S2.  No murmurs, gallops, rubs.   Pulmonary: Good air exchange. Lungs clear bilaterally. No wheezes, rhonchi, rales. No accessory muscle use.   Abdomen: Soft, nonsurgical. Nontender. No peritoneal signs. Normoactive bowel sounds.   Back: No CVA tenderness. No midline tenderness or paraspinal tenderness. No obvious deformity.   Extremities: No peripheral edema.  Full range of motion. Moves all extremities freely. No tenderness " throughout extremities.   Skin: No rash. Warm and Dry.   Neuro: No focal neurologic deficits. CN 2-12 grossly intact. Sensation equal bilaterally. No weakness.         Labs Reviewed   POCT GLUCOSE - Abnormal       Result Value    POCT Glucose 113 (*)    POCT GLUCOSE METER       No orders to display         ED Course & MDM     Medical Decision Making  This is a 44-year-old male with past medical history of schizoaffective sore, obesity, substance use disorder, hypertension, diabetes who presents to the ED after being found sleeping in his wheelchair on the side of the road.  On examination patient is alert and oriented x 3.  Mucous membranes moist.  Abdomen soft and nontender.  Lungs clear to auscultation.  He states that he felt very hot when sitting outside but is feeling improved now that he is lying in the hospital bed.  He is requesting food and drink.  He was provided with ginger ale and turkey sandwiches.  Blood glucose level obtained.  Patient's recent ED visits were reviewed.  Patient had laboratory studies done 3 days ago which were grossly unremarkable.  He was seen by our psychiatric team within the past 1 week and was cleared.  Patient denied any SI, HI, visual or auditory hallucinations at this time and does not appear to be acutely psychotic.  On my reassessment he was feeling improved.  Blood glucose was 113.  Vitals within normal limits.  Very low suspicion for true heatstroke as the patient was afebrile, had normal vital signs, was not diaphoretic, was not warm to touch, and the current outdoor temperature is 70 degrees and cloudy with no other exposures to heat.  At this point in time patient felt comfortable being discharged home.  He was tolerating p.o. intake without difficulty.  He was discharged from the emergency department in stable condition    Amount and/or Complexity of Data Reviewed  Labs: ordered.         Diagnoses as of 06/27/24 1535   Homeless   Hungry, initial encounter        Procedures    Deann Catalan, SUSHANT, ROM Catalan PA-C  06/27/24 1536

## 2024-06-27 NOTE — ED TRIAGE NOTES
Bystander called ems reporting he was asleeping sprawled out in wheelchair. Patient reports feels like he's overheated and like he was going to pass out. A&0x4

## 2024-07-06 ENCOUNTER — HOSPITAL ENCOUNTER (EMERGENCY)
Facility: HOSPITAL | Age: 44
Discharge: HOME | End: 2024-07-06
Payer: COMMERCIAL

## 2024-07-06 VITALS
TEMPERATURE: 97.2 F | HEART RATE: 97 BPM | WEIGHT: 300 LBS | BODY MASS INDEX: 45.47 KG/M2 | RESPIRATION RATE: 18 BRPM | HEIGHT: 68 IN

## 2024-07-06 DIAGNOSIS — M79.605 PAIN IN BOTH LOWER EXTREMITIES: Primary | ICD-10-CM

## 2024-07-06 DIAGNOSIS — M79.604 PAIN IN BOTH LOWER EXTREMITIES: Primary | ICD-10-CM

## 2024-07-06 PROCEDURE — 2500000001 HC RX 250 WO HCPCS SELF ADMINISTERED DRUGS (ALT 637 FOR MEDICARE OP): Mod: SE

## 2024-07-06 PROCEDURE — 99284 EMERGENCY DEPT VISIT MOD MDM: CPT

## 2024-07-06 PROCEDURE — 99283 EMERGENCY DEPT VISIT LOW MDM: CPT

## 2024-07-06 RX ORDER — IBUPROFEN 600 MG/1
600 TABLET ORAL ONCE
Status: COMPLETED | OUTPATIENT
Start: 2024-07-06 | End: 2024-07-06

## 2024-07-06 RX ADMIN — IBUPROFEN 600 MG: 600 TABLET, FILM COATED ORAL at 01:18

## 2024-07-06 NOTE — ED PROVIDER NOTES
HPI   Chief Complaint   Patient presents with    Leg Pain       Patient is a 44-year-old male, with a previous medical history of hypertension, paranoid schizophrenia, substance abuse, diabetes, and elevated BMI presenting to the ED with leg pain.  Patient endorses pain in his bilateral legs for the past few months.  He denies inciting falls, injury, or trauma.  He states he did not take anything for relief of his symptoms prior to presenting today.  Patient does state he is unable to walk, although he states he does not at baseline and uses his wheelchair.  He denies any other concerns or symptoms.                No data recorded                   Patient History   Past Medical History:   Diagnosis Date    Acute (undifferentiated) schizophrenia (Multi)      Past Surgical History:   Procedure Laterality Date    CT AORTA AND BILATERAL ILIOFEMORAL RUNOFF ANGIOGRAM W AND/OR WO IV CONTRAST  11/3/2022    CT AORTA AND BILATERAL ILIOFEMORAL RUNOFF ANGIOGRAM W AND/OR WO IV CONTRAST [place]20[place] [facility]     No family history on file.  Social History     Tobacco Use    Smoking status: Never    Smokeless tobacco: Never   Vaping Use    Vaping status: Never Used   Substance Use Topics    Alcohol use: Never    Drug use: Not on file       Physical Exam   ED Triage Vitals [07/06/24 0045]   Temperature Heart Rate Respirations BP   36.2 °C (97.2 °F) 97 18 --      SpO2 Temp src Heart Rate Source Patient Position   -- -- -- --      BP Location FiO2 (%)     -- --       Physical Exam  Vitals reviewed.   Constitutional:       General: He is not in acute distress.     Appearance: He is not ill-appearing.   Cardiovascular:      Rate and Rhythm: Normal rate and regular rhythm.   Pulmonary:      Effort: Pulmonary effort is normal. No respiratory distress.      Breath sounds: Normal breath sounds.   Abdominal:      General: Bowel sounds are normal.      Palpations: Abdomen is soft.      Tenderness: There is no abdominal tenderness.    Musculoskeletal:      Right lower leg: No edema.      Left lower leg: No edema.      Comments: Freely moving all extremities.  Bilateral lower extremities nontender without edema or obvious deformities/abnormalities.  Neurovascularly intact bilaterally.   Skin:     General: Skin is warm and dry.   Neurological:      General: No focal deficit present.      Mental Status: He is alert. Mental status is at baseline.         ED Course & MDM   Diagnoses as of 07/06/24 0122   Pain in both lower extremities       Medical Decision Making  Patient is a 44-year-old male, with a previous medical history of hypertension, paranoid schizophrenia, substance abuse, diabetes, and elevated BMI presenting to the ED with leg pain.  History was obtained from the patient.  Endorses bilateral leg pain for the past few months.  No falls, injury, or trauma.  No new symptoms than previously.  States he is unable to walk.  Has no other concerns.  On physical exam, patient is sitting comfortably and in no apparent distress.  Lungs CTA bilaterally without increased work of breathing.  Patient is freely moving all extremities.  Bilateral lower extremities nontender without edema or obvious abnormalities.  Neurovascularly intact bilaterally.  Remainder of exam as noted above.  Vital signs stable.    Patient symptoms are chronic in nature.  No indication for lab work or imaging at this time.  He was given a dose of ibuprofen followed by discharge.  He was discharged from the ED in stable condition.        Procedure  Procedures     Eneida Aguirre PA-C  07/06/24 0152

## 2024-07-06 NOTE — ED TRIAGE NOTES
Pt to ED c/o biltaeral leg pain x 1 month. Pt denies any recent injury or trauma to his lower extremity. Pt unable to bear on weight. Pt rates pain 10/10 - denies taking any medications for pain relief at home. Pt has been seen multiple times for same complaint, states he never has received any discharge information. Pt denies any suicidal or homicidal thoughts, denies any auditory or visual hallucinations.    Xolair Counseling:  Patient informed of potential adverse effects including but not limited to fever, muscle aches, rash and allergic reactions.  The patient verbalized understanding of the proper use and possible adverse effects of Xolair.  All of the patient's questions and concerns were addressed.

## 2024-07-11 ENCOUNTER — HOSPITAL ENCOUNTER (EMERGENCY)
Facility: HOSPITAL | Age: 44
Discharge: OTHER INSTITUTION | End: 2024-07-11
Payer: COMMERCIAL

## 2024-07-11 PROCEDURE — 4500999001 HC ED NO CHARGE

## 2024-07-23 ENCOUNTER — HOSPITAL ENCOUNTER (EMERGENCY)
Facility: HOSPITAL | Age: 44
Discharge: HOME | End: 2024-07-23
Payer: COMMERCIAL

## 2024-07-23 VITALS
DIASTOLIC BLOOD PRESSURE: 94 MMHG | HEIGHT: 68 IN | TEMPERATURE: 98.1 F | SYSTOLIC BLOOD PRESSURE: 155 MMHG | WEIGHT: 299.83 LBS | RESPIRATION RATE: 18 BRPM | BODY MASS INDEX: 45.44 KG/M2 | OXYGEN SATURATION: 98 % | HEART RATE: 89 BPM

## 2024-07-23 PROCEDURE — 4500999001 HC ED NO CHARGE: Performed by: EMERGENCY MEDICINE

## 2024-07-23 ASSESSMENT — PAIN - FUNCTIONAL ASSESSMENT: PAIN_FUNCTIONAL_ASSESSMENT: 0-10

## 2024-07-23 NOTE — ED TRIAGE NOTES
Pt to ed with complaints of bilat leg pain for several months. Pt states he can't walk anymore. Vitals stable

## 2024-07-26 ENCOUNTER — APPOINTMENT (OUTPATIENT)
Dept: RADIOLOGY | Facility: HOSPITAL | Age: 44
End: 2024-07-26
Payer: COMMERCIAL

## 2024-07-26 ENCOUNTER — HOSPITAL ENCOUNTER (EMERGENCY)
Facility: HOSPITAL | Age: 44
Discharge: HOME | End: 2024-07-26
Payer: COMMERCIAL

## 2024-07-26 VITALS
RESPIRATION RATE: 14 BRPM | HEIGHT: 68 IN | OXYGEN SATURATION: 97 % | SYSTOLIC BLOOD PRESSURE: 126 MMHG | DIASTOLIC BLOOD PRESSURE: 89 MMHG | TEMPERATURE: 98.1 F | HEART RATE: 79 BPM | WEIGHT: 300 LBS | BODY MASS INDEX: 45.47 KG/M2

## 2024-07-26 DIAGNOSIS — M25.562 ACUTE PAIN OF LEFT KNEE: Primary | ICD-10-CM

## 2024-07-26 PROCEDURE — 99284 EMERGENCY DEPT VISIT MOD MDM: CPT | Performed by: PHYSICIAN ASSISTANT

## 2024-07-26 PROCEDURE — 73564 X-RAY EXAM KNEE 4 OR MORE: CPT | Mod: LEFT SIDE | Performed by: RADIOLOGY

## 2024-07-26 PROCEDURE — 2500000001 HC RX 250 WO HCPCS SELF ADMINISTERED DRUGS (ALT 637 FOR MEDICARE OP): Mod: SE | Performed by: PHYSICIAN ASSISTANT

## 2024-07-26 PROCEDURE — 99283 EMERGENCY DEPT VISIT LOW MDM: CPT

## 2024-07-26 PROCEDURE — 73564 X-RAY EXAM KNEE 4 OR MORE: CPT | Mod: LT

## 2024-07-26 RX ORDER — NAPROXEN 500 MG/1
500 TABLET ORAL ONCE
Status: COMPLETED | OUTPATIENT
Start: 2024-07-26 | End: 2024-07-26

## 2024-07-26 ASSESSMENT — PAIN SCALES - GENERAL: PAINLEVEL_OUTOF10: 2

## 2024-07-26 ASSESSMENT — LIFESTYLE VARIABLES
HAVE YOU EVER FELT YOU SHOULD CUT DOWN ON YOUR DRINKING: NO
HAVE PEOPLE ANNOYED YOU BY CRITICIZING YOUR DRINKING: NO
TOTAL SCORE: 0
EVER HAD A DRINK FIRST THING IN THE MORNING TO STEADY YOUR NERVES TO GET RID OF A HANGOVER: NO
EVER FELT BAD OR GUILTY ABOUT YOUR DRINKING: NO

## 2024-07-26 ASSESSMENT — PAIN - FUNCTIONAL ASSESSMENT: PAIN_FUNCTIONAL_ASSESSMENT: 0-10

## 2024-07-26 ASSESSMENT — PAIN DESCRIPTION - LOCATION: LOCATION: KNEE

## 2024-07-27 NOTE — ED PROVIDER NOTES
"HPI   Chief Complaint   Patient presents with    Knee Pain       HPI:Patient is a 44-year-old male with a history of hypertension, obesity, paranoid schizophrenia who presents to the ED for left knee pain that onset prior to arrival.  Patient states that he is concerned that his left knee is \"disconnected\".  States that he stepped down onto it wrong and now it hurts.  States that his pain is a 2 out of 10 in severity which is worsened when he tries to ambulate.  ------------------------------------------------------------------------------------------------------------------------------------------  ROS: a ten point review of systems was performed and was negative except as per HPI.  ------------------------------------------------------------------------------------------------------------------------------------------  PMH / PSH: as per HPI, otherwise reviewed   MEDS: as per HPI, otherwise reviewed in EMR  ALLERGIES: as per HPI, otherwise reviewed in EMR  SocH:  as per HPI, otherwise reviewed in EMR  FH:  as per HPI, otherwise reviewed in EMR   ------------------------------------------------------------------------------------------------------------------------------------------  Physical Exam:  VS: As documented in the triage note and EMR flowsheet from this visit was reviewed  General: Well appearing. No acute distress.   Eyes:  Extraocular movements grossly intact. No scleral icterus.   Head: Atraumatic. Normocephalic.     Neck: No meningismus. No gross masses. Full movement through range of motion  CV: Regular rhythm. No murmurs, rubs, gallops appreciated.   Resp: Clear to auscultation bilaterally. No respiratory distress.    GI: Nontender. Soft. No masses. No rebound, rigidity or guarding.   MSK: Symmetric muscle bulk. No gross step offs or deformities.No tenderness to palpation to the left knee.  Skin: Warm, dry. No rashes  Neuro: CN II-VII intact. A&O x3. Speech fluent. Alert. Moving all extremities. " Ambulates with normal gait  Psych: Appropriate mood and affect for situation  ------------------------------------------------------------------------------------------------------------------------------------------  Hospital Course / Medical Decision Making:Patient is a 44-year-old male who presents to the ED for left knee pain that occurred prior to arrival.  States that it occurred after stepping down onto it wrong and is now concerned that it is dislocated.  On examination, patient overall well-appearing.  Vitals are stable.  No tenderness to palpation to the left knee.  Patient administered naproxen. XR of the knee shows moderate tricompartmental osteoarthrosis, no evidence of acute osseous abnormality. Patient advised on conservative management. As a result of the work-up, the patient was discharged home in stable condition.  They were informed of the diagnosis and instructed to come back with any concerns or worsening of condition.  Agreeable to the plan as discussed above.  Patient given the opportunity to ask questions.  All of the patient's questions were answered.                 Patient History   Past Medical History:   Diagnosis Date    Acute (undifferentiated) schizophrenia (Multi)      Past Surgical History:   Procedure Laterality Date    CT AORTA AND BILATERAL ILIOFEMORAL RUNOFF ANGIOGRAM W AND/OR WO IV CONTRAST  11/3/2022    CT AORTA AND BILATERAL ILIOFEMORAL RUNOFF ANGIOGRAM W AND/OR WO IV CONTRAST 11/3/2022 [facility] [place]     No family history on file.  Social History     Tobacco Use    Smoking status: Never    Smokeless tobacco: Never   Vaping Use    Vaping status: Never Used   Substance Use Topics    Alcohol use: Never    Drug use: Not on file       Physical Exam   ED Triage Vitals [07/26/24 2256]   Temperature Heart Rate Respirations BP   36.7 °C (98.1 °F) 79 14 126/89      Pulse Ox Temp Source Heart Rate Source Patient Position   97 % Oral Monitor Sitting      BP Location FiO2 (%)      Left arm 21 %       Physical Exam      ED Course & MDM   Diagnoses as of 07/26/24 2351   Acute pain of left knee                       Julius Coma Scale Score: 15                        Medical Decision Making      Procedure  Procedures     Dulce Motta PA-C  07/26/24 8241

## 2024-07-27 NOTE — ED TRIAGE NOTES
Pt states his knee feels very loose. States he want to [facility] main 2 weeks ago for the same situation and doctors gave him a wrap for his knee but he lost the wrap. Pt endorses 2/10 pain in L knee.

## 2024-07-30 ENCOUNTER — HOSPITAL ENCOUNTER (EMERGENCY)
Facility: HOSPITAL | Age: 44
Discharge: HOME | End: 2024-07-31
Payer: COMMERCIAL

## 2024-07-30 ENCOUNTER — HOSPITAL ENCOUNTER (EMERGENCY)
Facility: HOSPITAL | Age: 44
Discharge: HOME | End: 2024-07-30
Payer: COMMERCIAL

## 2024-07-30 VITALS
BODY MASS INDEX: 45.47 KG/M2 | WEIGHT: 300 LBS | OXYGEN SATURATION: 97 % | DIASTOLIC BLOOD PRESSURE: 89 MMHG | HEIGHT: 68 IN | SYSTOLIC BLOOD PRESSURE: 130 MMHG | RESPIRATION RATE: 16 BRPM | TEMPERATURE: 98.4 F | HEART RATE: 92 BPM

## 2024-07-30 DIAGNOSIS — M25.562 ACUTE PAIN OF LEFT KNEE: Primary | ICD-10-CM

## 2024-07-30 PROCEDURE — 99283 EMERGENCY DEPT VISIT LOW MDM: CPT

## 2024-07-30 PROCEDURE — 4500999001 HC ED NO CHARGE

## 2024-07-30 PROCEDURE — 99282 EMERGENCY DEPT VISIT SF MDM: CPT

## 2024-07-30 ASSESSMENT — LIFESTYLE VARIABLES
HAVE YOU EVER FELT YOU SHOULD CUT DOWN ON YOUR DRINKING: NO
EVER HAD A DRINK FIRST THING IN THE MORNING TO STEADY YOUR NERVES TO GET RID OF A HANGOVER: NO
HAVE PEOPLE ANNOYED YOU BY CRITICIZING YOUR DRINKING: NO
EVER FELT BAD OR GUILTY ABOUT YOUR DRINKING: NO
TOTAL SCORE: 0

## 2024-07-30 ASSESSMENT — PAIN DESCRIPTION - DESCRIPTORS: DESCRIPTORS: ACHING

## 2024-07-30 ASSESSMENT — PAIN - FUNCTIONAL ASSESSMENT: PAIN_FUNCTIONAL_ASSESSMENT: 0-10

## 2024-07-30 ASSESSMENT — PAIN DESCRIPTION - LOCATION: LOCATION: HAND

## 2024-07-30 ASSESSMENT — PAIN SCALES - GENERAL: PAINLEVEL_OUTOF10: 8

## 2024-07-30 ASSESSMENT — PAIN DESCRIPTION - PAIN TYPE: TYPE: ACUTE PAIN

## 2024-07-31 PROCEDURE — 2500000001 HC RX 250 WO HCPCS SELF ADMINISTERED DRUGS (ALT 637 FOR MEDICARE OP): Mod: SE

## 2024-07-31 RX ORDER — ACETAMINOPHEN 325 MG/1
TABLET ORAL
Status: COMPLETED
Start: 2024-07-31 | End: 2024-07-31

## 2024-07-31 RX ORDER — IBUPROFEN 400 MG/1
400 TABLET ORAL ONCE
Status: COMPLETED | OUTPATIENT
Start: 2024-07-31 | End: 2024-07-31

## 2024-07-31 RX ORDER — IBUPROFEN 400 MG/1
TABLET ORAL
Status: COMPLETED
Start: 2024-07-31 | End: 2024-07-31

## 2024-07-31 RX ORDER — ACETAMINOPHEN 325 MG/1
975 TABLET ORAL ONCE
Status: COMPLETED | OUTPATIENT
Start: 2024-07-31 | End: 2024-07-31

## 2024-07-31 NOTE — ED TRIAGE NOTES
"Pt comes in states that it feels like something is pinching his left leg and hand; states that this has been going on for the past couple of months; denies any injury or trauma to the site; of note, patient was seen here earlier this morning and discharged for the same complaint; patient also states he has been out of his Haldol and Congentin for \"awhile\" and would like to have these refilled; Denies SI/HI/VH, states he has auditory hallucinations at baseline, nothing new today, no command hallucinations   "

## 2024-07-31 NOTE — ED PROVIDER NOTES
HPI   Chief Complaint   Patient presents with    Hand Pain    Knee Pain       44-year-old male presents for chief complaint of left knee pain and left hand pain.  He endorses injuring the knee a couple of days ago.  X-rays were negative and he was sent home with plan for follow-up with orthopedics.  Patient states that he has not had a chance to follow-up yet but the pain persists.  Denies taking any medication for this.  Denies any new injury.  Also denies injuring his hand.  Denies any swelling or rashes.  No fever, chills, myalgia.  No other complaints.              Patient History   Past Medical History:   Diagnosis Date    Acute (undifferentiated) schizophrenia (Multi)      Past Surgical History:   Procedure Laterality Date    CT AORTA AND BILATERAL ILIOFEMORAL RUNOFF ANGIOGRAM W AND/OR WO IV CONTRAST  11/3/2022    CT AORTA AND BILATERAL ILIOFEMORAL RUNOFF ANGIOGRAM W AND/OR WO IV CONTRAST [place] [facility] [place]     No family history on file.  Social History     Tobacco Use    Smoking status: Never    Smokeless tobacco: Never   Vaping Use    Vaping status: Never Used   Substance Use Topics    Alcohol use: Never    Drug use: Not on file       Physical Exam   ED Triage Vitals [07/30/24 2153]   Temperature Heart Rate Respirations BP   36.9 °C (98.4 °F) 92 16 130/89      Pulse Ox Temp Source Heart Rate Source Patient Position   97 % Oral -- --      BP Location FiO2 (%)     -- --       Physical Exam  Constitutional:       Appearance: Normal appearance.   HENT:      Head: Normocephalic and atraumatic.      Mouth/Throat:      Mouth: Mucous membranes are moist.      Pharynx: Oropharynx is clear.   Eyes:      Extraocular Movements: Extraocular movements intact.      Conjunctiva/sclera: Conjunctivae normal.      Pupils: Pupils are equal, round, and reactive to light.   Cardiovascular:      Rate and Rhythm: Normal rate and regular rhythm.      Pulses: Normal pulses.      Heart sounds: Normal heart sounds.    Pulmonary:      Effort: Pulmonary effort is normal.      Breath sounds: Normal breath sounds.   Abdominal:      General: Abdomen is flat.      Palpations: Abdomen is soft.   Musculoskeletal:         General: Normal range of motion.      Cervical back: Normal range of motion and neck supple.      Comments: MSPs intact in all extremities.  Mild tenderness to the anterior left knee as well as the left fourth and fifth metacarpal area.  No crepitus or deformity.  Cap refill less than 2 seconds in all digits.   Skin:     General: Skin is warm and dry.      Capillary Refill: Capillary refill takes less than 2 seconds.   Neurological:      General: No focal deficit present.      Mental Status: He is alert and oriented to person, place, and time.   Psychiatric:         Mood and Affect: Mood normal.         Behavior: Behavior normal.         Thought Content: Thought content normal.         Judgment: Judgment normal.           ED Course & MDM   Diagnoses as of 07/31/24 0014   Acute pain of left knee                       Julius Coma Scale Score: 15                        Medical Decision Making  Vital signs reviewed, unremarkable at this time.  Patient is well-appearing in no apparent distress.  Speaks full sentences without difficulty.  No more diagnostic test needed at this point.  Tylenol Motrin given for pain control.  He was encouraged to follow-up with orthopedics and primary care.  Phone numbers were given.  Advised to take Tylenol Motrin at home as needed as prescribed previously for pain.  Patient in agreement this plan.  Discharged stable condition.        Procedure  Procedures     ELVIA Swanson-SHELBY  07/31/24 0017

## 2024-08-24 ENCOUNTER — HOSPITAL ENCOUNTER (EMERGENCY)
Facility: HOSPITAL | Age: 44
Discharge: OTHER INSTITUTION | End: 2024-08-24
Payer: COMMERCIAL

## 2024-08-24 PROCEDURE — 4500999001 HC ED NO CHARGE: Performed by: STUDENT IN AN ORGANIZED HEALTH CARE EDUCATION/TRAINING PROGRAM

## 2024-09-05 ENCOUNTER — HOSPITAL ENCOUNTER (EMERGENCY)
Facility: HOSPITAL | Age: 44
Discharge: STILL ADMITTED OR ADMINISTRATIVE | End: 2024-09-05
Payer: COMMERCIAL

## 2024-09-12 ENCOUNTER — HOSPITAL ENCOUNTER (EMERGENCY)
Facility: HOSPITAL | Age: 44
Discharge: LEFT BEFORE DISCHARGE | End: 2024-09-12
Payer: COMMERCIAL

## 2024-09-12 VITALS
RESPIRATION RATE: 20 BRPM | DIASTOLIC BLOOD PRESSURE: 76 MMHG | OXYGEN SATURATION: 97 % | TEMPERATURE: 97.8 F | HEART RATE: 94 BPM | SYSTOLIC BLOOD PRESSURE: 148 MMHG

## 2024-09-12 PROCEDURE — 4500999001 HC ED NO CHARGE

## 2024-09-12 ASSESSMENT — LIFESTYLE VARIABLES
TOTAL SCORE: 0
EVER HAD A DRINK FIRST THING IN THE MORNING TO STEADY YOUR NERVES TO GET RID OF A HANGOVER: NO
HAVE YOU EVER FELT YOU SHOULD CUT DOWN ON YOUR DRINKING: NO
HAVE PEOPLE ANNOYED YOU BY CRITICIZING YOUR DRINKING: NO
EVER FELT BAD OR GUILTY ABOUT YOUR DRINKING: NO

## 2024-09-12 ASSESSMENT — PAIN - FUNCTIONAL ASSESSMENT: PAIN_FUNCTIONAL_ASSESSMENT: 0-10

## 2024-09-12 ASSESSMENT — PAIN DESCRIPTION - FREQUENCY: FREQUENCY: CONSTANT/CONTINUOUS

## 2024-09-12 ASSESSMENT — PAIN SCALES - GENERAL: PAINLEVEL_OUTOF10: 10 - WORST POSSIBLE PAIN

## 2024-09-12 ASSESSMENT — PAIN DESCRIPTION - PAIN TYPE: TYPE: ACUTE PAIN

## 2024-09-12 ASSESSMENT — PAIN DESCRIPTION - DESCRIPTORS: DESCRIPTORS: ACHING

## 2024-09-12 ASSESSMENT — PAIN DESCRIPTION - LOCATION: LOCATION: WRIST

## 2024-09-13 ENCOUNTER — HOSPITAL ENCOUNTER (EMERGENCY)
Facility: HOSPITAL | Age: 44
Discharge: HOME | End: 2024-09-13
Attending: STUDENT IN AN ORGANIZED HEALTH CARE EDUCATION/TRAINING PROGRAM
Payer: COMMERCIAL

## 2024-09-13 VITALS
HEART RATE: 73 BPM | DIASTOLIC BLOOD PRESSURE: 93 MMHG | TEMPERATURE: 98.6 F | SYSTOLIC BLOOD PRESSURE: 131 MMHG | RESPIRATION RATE: 20 BRPM | WEIGHT: 300 LBS | BODY MASS INDEX: 45.47 KG/M2 | OXYGEN SATURATION: 96 % | HEIGHT: 68 IN

## 2024-09-13 DIAGNOSIS — Z00.8 ENCOUNTER FOR PSYCHOLOGICAL EVALUATION: Primary | ICD-10-CM

## 2024-09-13 PROCEDURE — 36415 COLL VENOUS BLD VENIPUNCTURE: CPT | Performed by: STUDENT IN AN ORGANIZED HEALTH CARE EDUCATION/TRAINING PROGRAM

## 2024-09-13 PROCEDURE — 99285 EMERGENCY DEPT VISIT HI MDM: CPT | Performed by: STUDENT IN AN ORGANIZED HEALTH CARE EDUCATION/TRAINING PROGRAM

## 2024-09-13 PROCEDURE — 99284 EMERGENCY DEPT VISIT MOD MDM: CPT

## 2024-09-13 SDOH — HEALTH STABILITY: MENTAL HEALTH: IN THE PAST FEW WEEKS, HAVE YOU FELT THAT YOU OR YOUR FAMILY WOULD BE BETTER OFF IF YOU WERE DEAD?: NO

## 2024-09-13 SDOH — HEALTH STABILITY: MENTAL HEALTH: ARE YOU HAVING THOUGHTS OF KILLING YOURSELF RIGHT NOW?: NO

## 2024-09-13 SDOH — HEALTH STABILITY: MENTAL HEALTH: SUICIDAL BEHAVIOR (3 MONTHS): NO

## 2024-09-13 SDOH — HEALTH STABILITY: MENTAL HEALTH: ANXIETY SYMPTOMS: NO PROBLEMS REPORTED OR OBSERVED.

## 2024-09-13 SDOH — HEALTH STABILITY: MENTAL HEALTH: DEPRESSION SYMPTOMS: NO PROBLEMS REPORTED OR OBSERVED.

## 2024-09-13 SDOH — HEALTH STABILITY: MENTAL HEALTH: NON-SPECIFIC ACTIVE SUICIDAL THOUGHTS (PAST 1 MONTH): NO

## 2024-09-13 SDOH — ECONOMIC STABILITY: HOUSING INSECURITY: FEELS SAFE LIVING IN HOME: YES

## 2024-09-13 SDOH — HEALTH STABILITY: MENTAL HEALTH: SUICIDAL BEHAVIOR (LIFETIME): YES

## 2024-09-13 SDOH — HEALTH STABILITY: MENTAL HEALTH: IN THE PAST FEW WEEKS, HAVE YOU WISHED YOU WERE DEAD?: NO

## 2024-09-13 SDOH — HEALTH STABILITY: MENTAL HEALTH: IN THE PAST WEEK, HAVE YOU BEEN HAVING THOUGHTS ABOUT KILLING YOURSELF?: NO

## 2024-09-13 SDOH — HEALTH STABILITY: MENTAL HEALTH: HAVE YOU EVER TRIED TO KILL YOURSELF?: YES

## 2024-09-13 SDOH — HEALTH STABILITY: MENTAL HEALTH: WISH TO BE DEAD (PAST 1 MONTH): NO

## 2024-09-13 ASSESSMENT — LIFESTYLE VARIABLES
EVER FELT BAD OR GUILTY ABOUT YOUR DRINKING: NO
HAVE YOU EVER FELT YOU SHOULD CUT DOWN ON YOUR DRINKING: NO
PRESCIPTION_ABUSE_PAST_12_MONTHS: NO
EVER HAD A DRINK FIRST THING IN THE MORNING TO STEADY YOUR NERVES TO GET RID OF A HANGOVER: NO
SUBSTANCE_ABUSE_PAST_12_MONTHS: YES
HAVE PEOPLE ANNOYED YOU BY CRITICIZING YOUR DRINKING: NO
TOTAL SCORE: 0

## 2024-09-13 ASSESSMENT — COLUMBIA-SUICIDE SEVERITY RATING SCALE - C-SSRS
6. HAVE YOU EVER DONE ANYTHING, STARTED TO DO ANYTHING, OR PREPARED TO DO ANYTHING TO END YOUR LIFE?: YES
2. HAVE YOU ACTUALLY HAD ANY THOUGHTS OF KILLING YOURSELF?: NO
1. IN THE PAST MONTH, HAVE YOU WISHED YOU WERE DEAD OR WISHED YOU COULD GO TO SLEEP AND NOT WAKE UP?: NO
6. HAVE YOU EVER DONE ANYTHING, STARTED TO DO ANYTHING, OR PREPARED TO DO ANYTHING TO END YOUR LIFE?: NO

## 2024-09-13 ASSESSMENT — PAIN SCALES - GENERAL: PAINLEVEL_OUTOF10: 10 - WORST POSSIBLE PAIN

## 2024-09-13 ASSESSMENT — PAIN - FUNCTIONAL ASSESSMENT: PAIN_FUNCTIONAL_ASSESSMENT: 0-10
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
## 2024-09-13 NOTE — ED TRIAGE NOTES
Pt arrived to ED reporting left wrist pain. Pt states that he cut his wrist with a razor in 2000 and it is still giving him a lot of pain. Pt states that he was trying to hurt himself 24 years because he was bad off but the wrist still gives him pain. Pt denies having any present thoughts of wanting to harm himself

## 2024-09-13 NOTE — ED TRIAGE NOTES
"Pt presents with c/o hearing voices, \"saying different things\", Pt denies Si or HI. Pt then c/o left wrist pain in an attempt to harm himself,states he cut himself in 2000.  "

## 2024-09-13 NOTE — ED PROVIDER NOTES
"Emergency Department Provider Note        History of Present Illness     History provided by: Patient  Limitations to History: Mental Illness  External Records Reviewed with Brief Summary:   multiple ER visits for schizophrenia and knee pain including 1 yesterday, on 9/10, , ,   of this      HPI:  Anibal Caro is a 44 y.o. male  who is well-known to this department who arrives with a chief complaint of hearing voices that are \"saying things\".  Denies suicidal ideation, denies plan,  denies homicidal ideation, denies visual hallucinations.  Patient does state that he has a history of schizophrenia and is supposed to be taking Haldol and  Cogentin.  Patient states his last medication was filled 1 week ago, and that he follows up with his psychiatrist.  He denies chest pain denies shortness of breath, denies syncope, denies changes in stool or urine, denies trauma.  He denies medical complaints at this time and states that he would like to speak to the emergency psychiatric assessment team.  He states he has no physical complaints at this time.    Physical Exam   Triage vitals:  T 36.3 °C (97.3 °F)  HR 91  BP (!) 166/107  RR 18  O2 96 %      General: Awake, alert, in no acute distress, appears disheveled  Eyes: Gaze conjugate.  No scleral icterus or injection  HENT: Normo-cephalic, atraumatic. No stridor  CV: Regular rate, regular rhythm. Radial pulses 2+ bilaterally  Resp: Breathing non-labored, speaking in full sentences.  Clear to auscultation bilaterally  GI: Soft, non-distended, non-tender. No rebound or guarding.  :  deferred  MSK/Extremities: No gross bony deformities. Moving all extremities  Skin: Warm. Appropriate color  Neuro: Alert. Oriented. Face symmetric. Speech is fluent.  Gross strength and sensation intact in b/l UE and LEs  Psych: Appropriate mood and affect    Medical Decision Making & ED Course   Medical Decision Makin y.o. male  who arrives to the emergency department with " "a chief complaint of hearing voices that are \"saying different things\" and nonspecific auditory hallucinations.  He denies homicidal ideation, denies suicidal ideation, denies plan, denies visual hallucinations.  He denies physical symptoms, states he is here to speak with psychiatric counseling.  EPAT was consulted.  ----      Differential diagnoses considered include but are not limited to:  schizophrenia, medical noncompliance, psychiatric illness exacerbation     Social Determinants of Health which Significantly Impact Care: Mental health disorder The following actions were taken to address these social determinants:  EPAT consulted  Independent Result Review and Interpretation: Relevant laboratory and radiographic results were reviewed and independently interpreted by myself.  As necessary, they are commented on in the ED Course.    Chronic conditions affecting the patient's care: As documented above in [facility]    The patient was discussed with the following consultants/services:   EPAT    Care Considerations: As documented above in [facility]    ED Course:  ED Course as of 09/13/24 0934   Fri Sep 13, 2024   0901   EPAT has evaluated the patient and cleared him, they state he has follow-up with [facility], they have no medical concerns for the patient.  They state the patient can be discharged safely. [PV]   0914 Emergency Medicine Attending Attestation:     My assessment reveals a 44-year-old male with known history schizophrenia presenting to the emergency department with hallucinations.  He is seen regularly in multiple emergency departments around our city for similar complaints.  Was valued by EPAT who feels he is at his baseline.  Patient denies any SI, HI.  Patient will be discharged home.  Patient agreeable to this.  No acute medical concerns. [SH]      ED Course User Index  [PV] Yves Garcia DO  [SH] Joseph Lau MD     Disposition   As a result of the work-up, the patient was discharged home.  he was " informed of his diagnosis and instructed to come back with any concerns or worsening of condition.  he and was agreeable to the plan as discussed above.  he was given the opportunity to ask questions.  All of the patient's questions were answered.    Procedures   Procedures    Patient seen and discussed with ED attending physician.    Yves Garcia DO  Emergency Medicine     Yves Garcia DO  Resident  09/13/24 0954

## 2024-09-13 NOTE — DISCHARGE INSTRUCTIONS
Please follow-up with [facility] as instructed by your psychiatric team.  please take your medications as   You can always return a emergency department anytime if you need to be reevaluated.

## 2024-09-26 ENCOUNTER — HOSPITAL ENCOUNTER (EMERGENCY)
Facility: HOSPITAL | Age: 44
Discharge: HOME | End: 2024-09-26
Payer: COMMERCIAL

## 2024-09-26 ENCOUNTER — HOSPITAL ENCOUNTER (EMERGENCY)
Facility: HOSPITAL | Age: 44
Discharge: HOME | End: 2024-09-27
Payer: COMMERCIAL

## 2024-09-26 VITALS
SYSTOLIC BLOOD PRESSURE: 123 MMHG | RESPIRATION RATE: 18 BRPM | HEART RATE: 103 BPM | TEMPERATURE: 98.1 F | DIASTOLIC BLOOD PRESSURE: 85 MMHG | HEIGHT: 68 IN | OXYGEN SATURATION: 98 % | BODY MASS INDEX: 45.47 KG/M2 | WEIGHT: 300 LBS

## 2024-09-26 DIAGNOSIS — G89.29 CHRONIC PAIN OF LEFT KNEE: Primary | ICD-10-CM

## 2024-09-26 DIAGNOSIS — M25.562 CHRONIC PAIN OF LEFT KNEE: Primary | ICD-10-CM

## 2024-09-26 PROCEDURE — 99283 EMERGENCY DEPT VISIT LOW MDM: CPT

## 2024-09-26 PROCEDURE — 4500999001 HC ED NO CHARGE

## 2024-09-26 ASSESSMENT — LIFESTYLE VARIABLES
HAVE PEOPLE ANNOYED YOU BY CRITICIZING YOUR DRINKING: NO
EVER FELT BAD OR GUILTY ABOUT YOUR DRINKING: NO
EVER HAD A DRINK FIRST THING IN THE MORNING TO STEADY YOUR NERVES TO GET RID OF A HANGOVER: NO
TOTAL SCORE: 0
HAVE YOU EVER FELT YOU SHOULD CUT DOWN ON YOUR DRINKING: NO

## 2024-09-26 ASSESSMENT — PAIN DESCRIPTION - FREQUENCY: FREQUENCY: CONSTANT/CONTINUOUS

## 2024-09-26 ASSESSMENT — PAIN - FUNCTIONAL ASSESSMENT: PAIN_FUNCTIONAL_ASSESSMENT: 0-10

## 2024-09-26 ASSESSMENT — PAIN DESCRIPTION - LOCATION: LOCATION: LEG

## 2024-09-26 ASSESSMENT — PAIN DESCRIPTION - DESCRIPTORS: DESCRIPTORS: ACHING

## 2024-09-26 ASSESSMENT — PAIN DESCRIPTION - ORIENTATION: ORIENTATION: LEFT;RIGHT

## 2024-09-26 ASSESSMENT — PAIN SCALES - GENERAL: PAINLEVEL_OUTOF10: 4

## 2024-09-26 ASSESSMENT — PAIN DESCRIPTION - PAIN TYPE: TYPE: ACUTE PAIN

## 2024-09-27 ENCOUNTER — APPOINTMENT (OUTPATIENT)
Dept: RADIOLOGY | Facility: HOSPITAL | Age: 44
End: 2024-09-27
Payer: COMMERCIAL

## 2024-09-27 PROCEDURE — 73562 X-RAY EXAM OF KNEE 3: CPT | Mod: LEFT SIDE | Performed by: RADIOLOGY

## 2024-09-27 PROCEDURE — 2500000001 HC RX 250 WO HCPCS SELF ADMINISTERED DRUGS (ALT 637 FOR MEDICARE OP): Mod: SE

## 2024-09-27 PROCEDURE — 73562 X-RAY EXAM OF KNEE 3: CPT | Mod: LT

## 2024-09-27 RX ORDER — LIDOCAINE 560 MG/1
1 PATCH PERCUTANEOUS; TOPICAL; TRANSDERMAL DAILY
Status: DISCONTINUED | OUTPATIENT
Start: 2024-09-27 | End: 2024-09-27 | Stop reason: HOSPADM

## 2024-09-27 RX ORDER — IBUPROFEN 600 MG/1
600 TABLET ORAL EVERY 6 HOURS PRN
Qty: 28 TABLET | Refills: 0 | Status: SHIPPED | OUTPATIENT
Start: 2024-09-27 | End: 2024-10-04

## 2024-09-27 RX ORDER — ACETAMINOPHEN 500 MG
500 TABLET ORAL EVERY 6 HOURS PRN
Qty: 28 TABLET | Refills: 0 | Status: SHIPPED | OUTPATIENT
Start: 2024-09-27 | End: 2024-10-04

## 2024-09-27 RX ORDER — IBUPROFEN 600 MG/1
600 TABLET ORAL ONCE
Status: COMPLETED | OUTPATIENT
Start: 2024-09-27 | End: 2024-09-27

## 2024-09-27 RX ORDER — ACETAMINOPHEN 325 MG/1
975 TABLET ORAL ONCE
Status: COMPLETED | OUTPATIENT
Start: 2024-09-27 | End: 2024-09-27

## 2024-09-27 ASSESSMENT — PAIN SCALES - GENERAL: PAINLEVEL_OUTOF10: 5 - MODERATE PAIN

## 2024-09-27 NOTE — ED TRIAGE NOTES
Pt reports 4/10 bilateral leg pain that he has had for awhile he would like a prescription for a wheelchair

## 2024-09-27 NOTE — ED PROVIDER NOTES
"HPI   Chief Complaint   Patient presents with    Leg Pain   This is a 44 year old male with a past medical history significant for hypertension, schizophrenia, homelessness and polysubstance abuse who presents to the ED with leg pain.  Patient states he has had pain in his left knee for \"a long time\".  He states that it has been worse recently as he has been walking around a lot, currently rated 4/10.  Denies any weakness, numbness, tingling or coolness in his extremities. Denies any falls or direct blows.  He has not taken anything OTC for his symptoms.  Patient is requesting to speak to social work to help him \"get off these drugs\".  Upon review of patient's records, he has been seen multiple times in the ED for various complaints, including multiple visits for left leg pain.     Denies fevers, chills, headache, chest pain, shortness of breath, abdominal pain, N/V/D    Independent Historians: Patient  External Records Reviewed: Prior ED notes      Patient History   Past Medical History:   Diagnosis Date    Acute (undifferentiated) schizophrenia (Multi)      Past Surgical History:   Procedure Laterality Date    CT AORTA AND BILATERAL ILIOFEMORAL RUNOFF ANGIOGRAM W AND/OR WO IV CONTRAST  11/3/2022    CT AORTA AND BILATERAL ILIOFEMORAL RUNOFF ANGIOGRAM W AND/OR WO IV CONTRAST 11/3/2022 [facility]     No family history on file.  Social History     Tobacco Use    Smoking status: Never    Smokeless tobacco: Never   Vaping Use    Vaping status: Never Used   Substance Use Topics    Alcohol use: Never    Drug use: Not on file       Physical Exam   ED Triage Vitals [09/26/24 2207]   Temperature Heart Rate Respirations BP   36.7 °C (98.1 °F) (!) 103 18 123/85      Pulse Ox Temp Source Heart Rate Source Patient Position   98 % Temporal Monitor Sitting      BP Location FiO2 (%)     Left arm --       Physical Exam  Constitutional:       General: He is not in acute distress.     Appearance: He is obese. He is not " "ill-appearing.      Comments: Patient appears unkempt and is malodorous   HENT:      Head: Normocephalic and atraumatic.   Cardiovascular:      Rate and Rhythm: Normal rate and regular rhythm.      Pulses:           Dorsalis pedis pulses are 2+ on the right side and 2+ on the left side.      Heart sounds: Normal heart sounds.   Pulmonary:      Effort: Pulmonary effort is normal. No respiratory distress.      Breath sounds: No wheezing, rhonchi or rales.   Abdominal:      General: Bowel sounds are normal.      Palpations: Abdomen is soft.      Tenderness: There is no abdominal tenderness. There is no guarding.      Hernia: No hernia is present.   Musculoskeletal:         General: No deformity or signs of injury.      Cervical back: Normal range of motion and neck supple. No pain with movement, spinous process tenderness or muscular tenderness.      Thoracic back: No bony tenderness. Normal range of motion.      Lumbar back: No bony tenderness. Normal range of motion.      Right knee: Normal.      Left knee: No swelling, deformity, erythema or ecchymosis. Normal range of motion. Tenderness present.      Right lower leg: No edema.      Left lower leg: No edema.      Comments: Patient endorses generalized tenderness to palpation of his knee.  No joint laxity with varus or valgus stress. No edema, erythema, ecchymosis, fluctuance, or increased warmth to the knee.   Neurological:      General: No focal deficit present.      Mental Status: He is alert.      Gait: Gait normal.         ED Course & MDM   Diagnoses as of 09/27/24 0118   Chronic pain of left knee       Medical Decision Making  This is a 44 year old male with a past medical history significant for hypertension, schizophrenia, homelessness and polysubstance abuse who presents to the ED with leg pain.  Patient states he has had pain in his left knee for \"a long time\".  He states that it has been worse recently as he has been walking around a lot, currently rated " 4/10.    On physical exam, patient is non-ill-appearing and in no acute distress.  He does appear unkempt and is malodorous.  There are no gross deformities or obvious signs of injury.  Head is normocephalic and atraumatic.  There is no midline spinous process tenderness in the cervical, thoracic or lumbar spine.  No step-offs in the back.  Patient endorses generalized tenderness to palpation of his left knee.  No edema, erythema, ecchymosis, fluctuance or increased warmth to the knee. No joint laxity on varus or valgus stress.  ROM of the knee is normal.  Sensation is intact throughout all surfaces of the LLE.  Pedal pulses are equal bilaterally    Engage [facility] at patient's request.  [facility].  Administered Tylenol, Motrin and a lidocaine patch for symptom management. No evidence of acute infection or neurovascular compromise based on benign physical exam findings. Left knee x-ray showed severe tricompartmental degenerative changes without evidence of  acute fracture.  Discussed results of ED findings and advised patient to follow-up with a PCP, provided the contact information for the [facility]. Discussed ED return criteria.  Patient verbalized understanding was agreeable plan of care. Discharged in stable condition.       XR knee left 3 views   Final Result   1. Severe tricompartmental degenerative changes without evidence of   acute fracture.   2. Slight lateral translation of the tibia with respect to the femur   similar to previous exam.                  Signed by: Lit Cummins 9/27/2024 12:48 AM   Dictation workstation:   [id number]              Elda Jay PA-C  09/27/24 0208

## 2024-10-04 ENCOUNTER — APPOINTMENT (OUTPATIENT)
Dept: RADIOLOGY | Facility: HOSPITAL | Age: 44
End: 2024-10-04
Payer: COMMERCIAL

## 2024-10-04 ENCOUNTER — HOSPITAL ENCOUNTER (EMERGENCY)
Facility: HOSPITAL | Age: 44
Discharge: HOME | End: 2024-10-05
Attending: EMERGENCY MEDICINE
Payer: COMMERCIAL

## 2024-10-04 DIAGNOSIS — R11.0 NAUSEA: ICD-10-CM

## 2024-10-04 DIAGNOSIS — R10.9 ABDOMINAL PAIN, UNSPECIFIED ABDOMINAL LOCATION: Primary | ICD-10-CM

## 2024-10-04 PROCEDURE — 99284 EMERGENCY DEPT VISIT MOD MDM: CPT | Performed by: EMERGENCY MEDICINE

## 2024-10-04 PROCEDURE — 71045 X-RAY EXAM CHEST 1 VIEW: CPT

## 2024-10-04 PROCEDURE — 74018 RADEX ABDOMEN 1 VIEW: CPT

## 2024-10-04 PROCEDURE — 99284 EMERGENCY DEPT VISIT MOD MDM: CPT | Mod: 25

## 2024-10-04 PROCEDURE — 2500000005 HC RX 250 GENERAL PHARMACY W/O HCPCS: Mod: SE

## 2024-10-04 RX ORDER — ONDANSETRON 4 MG/1
4 TABLET, ORALLY DISINTEGRATING ORAL ONCE
Status: COMPLETED | OUTPATIENT
Start: 2024-10-04 | End: 2024-10-04

## 2024-10-04 ASSESSMENT — PAIN DESCRIPTION - PAIN TYPE: TYPE: ACUTE PAIN

## 2024-10-04 ASSESSMENT — PAIN DESCRIPTION - LOCATION: LOCATION: ABDOMEN

## 2024-10-04 ASSESSMENT — PAIN DESCRIPTION - ORIENTATION: ORIENTATION: MID

## 2024-10-04 ASSESSMENT — PAIN SCALES - GENERAL: PAINLEVEL_OUTOF10: 5 - MODERATE PAIN

## 2024-10-04 ASSESSMENT — PAIN - FUNCTIONAL ASSESSMENT: PAIN_FUNCTIONAL_ASSESSMENT: 0-10

## 2024-10-05 ENCOUNTER — APPOINTMENT (OUTPATIENT)
Dept: RADIOLOGY | Facility: HOSPITAL | Age: 44
End: 2024-10-05
Payer: COMMERCIAL

## 2024-10-05 ENCOUNTER — HOSPITAL ENCOUNTER (EMERGENCY)
Facility: HOSPITAL | Age: 44
Discharge: HOME | End: 2024-10-05
Payer: COMMERCIAL

## 2024-10-05 ENCOUNTER — HOSPITAL ENCOUNTER (EMERGENCY)
Facility: HOSPITAL | Age: 44
Discharge: HOME | End: 2024-10-06
Attending: EMERGENCY MEDICINE
Payer: COMMERCIAL

## 2024-10-05 ENCOUNTER — CLINICAL SUPPORT (OUTPATIENT)
Dept: EMERGENCY MEDICINE | Facility: HOSPITAL | Age: 44
End: 2024-10-05
Payer: COMMERCIAL

## 2024-10-05 VITALS
DIASTOLIC BLOOD PRESSURE: 98 MMHG | OXYGEN SATURATION: 95 % | SYSTOLIC BLOOD PRESSURE: 147 MMHG | TEMPERATURE: 97 F | HEIGHT: 68 IN | HEART RATE: 90 BPM | RESPIRATION RATE: 18 BRPM | BODY MASS INDEX: 45.47 KG/M2 | WEIGHT: 300 LBS

## 2024-10-05 DIAGNOSIS — Z76.0 MEDICATION REFILL: ICD-10-CM

## 2024-10-05 DIAGNOSIS — F14.90 CRACK COCAINE USE: Primary | ICD-10-CM

## 2024-10-05 LAB
ALBUMIN SERPL BCP-MCNC: 3.8 G/DL (ref 3.4–5)
ALP SERPL-CCNC: 77 U/L (ref 33–120)
ALT SERPL W P-5'-P-CCNC: 19 U/L (ref 10–52)
ANION GAP SERPL CALC-SCNC: 14 MMOL/L (ref 10–20)
APAP SERPL-MCNC: <10 UG/ML
AST SERPL W P-5'-P-CCNC: 19 U/L (ref 9–39)
ATRIAL RATE: 102 BPM
BASOPHILS # BLD AUTO: 0.06 X10*3/UL (ref 0–0.1)
BASOPHILS NFR BLD AUTO: 0.9 %
BILIRUB SERPL-MCNC: 0.3 MG/DL (ref 0–1.2)
BUN SERPL-MCNC: 13 MG/DL (ref 6–23)
CALCIUM SERPL-MCNC: 9.2 MG/DL (ref 8.6–10.6)
CHLORIDE SERPL-SCNC: 100 MMOL/L (ref 98–107)
CO2 SERPL-SCNC: 24 MMOL/L (ref 21–32)
CREAT SERPL-MCNC: 1.04 MG/DL (ref 0.5–1.3)
EGFRCR SERPLBLD CKD-EPI 2021: >90 ML/MIN/1.73M*2
EOSINOPHIL # BLD AUTO: 0.14 X10*3/UL (ref 0–0.7)
EOSINOPHIL NFR BLD AUTO: 2.1 %
ERYTHROCYTE [DISTWIDTH] IN BLOOD BY AUTOMATED COUNT: 13.6 % (ref 11.5–14.5)
ETHANOL SERPL-MCNC: <10 MG/DL
GLUCOSE SERPL-MCNC: 75 MG/DL (ref 74–99)
HCT VFR BLD AUTO: 47.6 % (ref 41–52)
HGB BLD-MCNC: 15.3 G/DL (ref 13.5–17.5)
IMM GRANULOCYTES # BLD AUTO: 0.01 X10*3/UL (ref 0–0.7)
IMM GRANULOCYTES NFR BLD AUTO: 0.2 % (ref 0–0.9)
LYMPHOCYTES # BLD AUTO: 0.98 X10*3/UL (ref 1.2–4.8)
LYMPHOCYTES NFR BLD AUTO: 14.8 %
MCH RBC QN AUTO: 27.6 PG (ref 26–34)
MCHC RBC AUTO-ENTMCNC: 32.1 G/DL (ref 32–36)
MCV RBC AUTO: 86 FL (ref 80–100)
MONOCYTES # BLD AUTO: 0.8 X10*3/UL (ref 0.1–1)
MONOCYTES NFR BLD AUTO: 12.1 %
NEUTROPHILS # BLD AUTO: 4.62 X10*3/UL (ref 1.2–7.7)
NEUTROPHILS NFR BLD AUTO: 69.9 %
NRBC BLD-RTO: 0 /100 WBCS (ref 0–0)
P AXIS: 52 DEGREES
P OFFSET: 213 MS
P ONSET: 157 MS
PLATELET # BLD AUTO: 258 X10*3/UL (ref 150–450)
POTASSIUM SERPL-SCNC: 4.1 MMOL/L (ref 3.5–5.3)
PR INTERVAL: 128 MS
PROT SERPL-MCNC: 7.2 G/DL (ref 6.4–8.2)
Q ONSET: 221 MS
QRS COUNT: 17 BEATS
QRS DURATION: 86 MS
QT INTERVAL: 358 MS
QTC CALCULATION(BAZETT): 466 MS
QTC FREDERICIA: 427 MS
R AXIS: 70 DEGREES
RBC # BLD AUTO: 5.55 X10*6/UL (ref 4.5–5.9)
SALICYLATES SERPL-MCNC: <3 MG/DL
SODIUM SERPL-SCNC: 134 MMOL/L (ref 136–145)
T AXIS: -21 DEGREES
T OFFSET: 400 MS
VENTRICULAR RATE: 102 BPM
WBC # BLD AUTO: 6.6 X10*3/UL (ref 4.4–11.3)

## 2024-10-05 PROCEDURE — 85025 COMPLETE CBC W/AUTO DIFF WBC: CPT

## 2024-10-05 PROCEDURE — 36415 COLL VENOUS BLD VENIPUNCTURE: CPT

## 2024-10-05 PROCEDURE — 87636 SARSCOV2 & INF A&B AMP PRB: CPT

## 2024-10-05 PROCEDURE — 2500000001 HC RX 250 WO HCPCS SELF ADMINISTERED DRUGS (ALT 637 FOR MEDICARE OP): Mod: SE

## 2024-10-05 PROCEDURE — 71045 X-RAY EXAM CHEST 1 VIEW: CPT

## 2024-10-05 PROCEDURE — 74018 RADEX ABDOMEN 1 VIEW: CPT | Mod: FOREIGN READ | Performed by: RADIOLOGY

## 2024-10-05 PROCEDURE — 80143 DRUG ASSAY ACETAMINOPHEN: CPT

## 2024-10-05 PROCEDURE — 93005 ELECTROCARDIOGRAM TRACING: CPT

## 2024-10-05 PROCEDURE — 99283 EMERGENCY DEPT VISIT LOW MDM: CPT | Mod: 25

## 2024-10-05 PROCEDURE — 71045 X-RAY EXAM CHEST 1 VIEW: CPT | Mod: FOREIGN READ | Performed by: RADIOLOGY

## 2024-10-05 PROCEDURE — 4500999001 HC ED NO CHARGE

## 2024-10-05 PROCEDURE — 80053 COMPREHEN METABOLIC PANEL: CPT

## 2024-10-05 RX ORDER — ACETAMINOPHEN 325 MG/1
975 TABLET ORAL ONCE
Status: COMPLETED | OUTPATIENT
Start: 2024-10-05 | End: 2024-10-06

## 2024-10-05 RX ORDER — BENZONATATE 100 MG/1
100 CAPSULE ORAL ONCE
Status: COMPLETED | OUTPATIENT
Start: 2024-10-05 | End: 2024-10-06

## 2024-10-05 RX ORDER — ONDANSETRON 4 MG/1
4 TABLET, FILM COATED ORAL EVERY 6 HOURS
Qty: 12 TABLET | Refills: 0 | Status: SHIPPED | OUTPATIENT
Start: 2024-10-05 | End: 2024-10-08

## 2024-10-05 RX ORDER — IBUPROFEN 600 MG/1
600 TABLET ORAL ONCE
Status: COMPLETED | OUTPATIENT
Start: 2024-10-05 | End: 2024-10-06

## 2024-10-05 RX ORDER — DICYCLOMINE HYDROCHLORIDE 10 MG/1
10 CAPSULE ORAL ONCE
Status: COMPLETED | OUTPATIENT
Start: 2024-10-05 | End: 2024-10-05

## 2024-10-05 ASSESSMENT — PAIN SCALES - GENERAL: PAINLEVEL_OUTOF10: 0 - NO PAIN

## 2024-10-05 NOTE — DISCHARGE INSTRUCTIONS
You are prescribed Zofran to take as needed for symptom control. Your x-ray results were negative for any acute foreign body.Please do not eat trash as this will upset your stomach more.  You may have diarrhea in the next coming days.  That is normal given the fact that you may have a gastroenteritis.  Return if you have worsening symptoms or unable to eat despite taking Zofran

## 2024-10-05 NOTE — ED TRIAGE NOTES
Patient presents to the ED for evaluation of abdominal pain that began today. Patient reports that he ate some food out of the trash and has had some abdominal pain since then. Patient denies any N/V.

## 2024-10-05 NOTE — ED PROVIDER NOTES
CC: Abdominal Pain     HPI:   Patient is a 44-year-old male with past medical history of schizophrenia, hypertension, homelessness, polysubstance use disorder presenting due to concerns of nausea after eating trash.  Patient reports he was hungry and had with sandwich with chicken out of the trash.  He does report he was wrapped in plastic and believes he took a plastic off prior to consuming it.  He has not had any episodes of emesis, hematemesis but does have nausea.  Patient has not had any diarrhea and denies any congestion, cough or chest pain.  Patient has a soft abdomen that is distended secondary to body habitus.  Patient has baseline hallucinations and is consistently talking to himself when provider is not in the room.  Patient denies any recent drug use prior to arrival.    Limitations to History: schizophrenia  Additional History Obtained from: patient alone    PMHx/PSHx:  Per HPI.   - has a past medical history of Acute (undifferentiated) schizophrenia (Multi).  - has a past surgical history that includes CT angio aorta and bilateral iliofemoral runoff w and or wo IV contrast (11/3/2022).    Social History:  - Tobacco:  reports that he has never smoked. He has never used smokeless tobacco.   - Alcohol:  reports no history of alcohol use.   - Drugs:  has no history on file for drug use.     Medications: Reviewed in EMR.     Allergies:  Amoxicillin, Bee pollen, Grass pollen, and Pollen extracts    ???????????????????????????????????????????????????????????????  Triage Vitals:  T 36.6 °C (97.8 °F)  HR 99  /82  RR 18  O2 95 % None (Room air)    Physical Exam  Vitals and nursing note reviewed.   Constitutional:       General: He is not in acute distress.     Appearance: He is well-developed. He is obese.   HENT:      Head: Normocephalic and atraumatic.   Eyes:      Conjunctiva/sclera: Conjunctivae normal.   Cardiovascular:      Rate and Rhythm: Normal rate and regular rhythm.      Heart sounds: No  murmur heard.  Pulmonary:      Effort: Pulmonary effort is normal. No respiratory distress.      Breath sounds: Normal breath sounds.   Abdominal:      General: Abdomen is protuberant. There are no signs of injury.      Palpations: Abdomen is soft. There is no shifting dullness.      Tenderness: There is no abdominal tenderness. Negative signs include Cortés's sign, Rovsing's sign and McBurney's sign.   Musculoskeletal:         General: No swelling.      Cervical back: Neck supple.   Skin:     General: Skin is warm and dry.      Capillary Refill: Capillary refill takes less than 2 seconds.   Neurological:      Mental Status: He is alert.   Psychiatric:         Mood and Affect: Mood normal.       ???????????????????????????????????????????????????????????????  EKG (per my interpretation):  not obtained    ED Course       Medical Decision Making:  Patient is a 44-year-old male with history of schizophrenia, hypertension, homelessness and polysubstance use disorder presenting due to eating trash.  Differentials considered but not limited to gastric perforation, foreign body ingestion, gastritis, food poisoning. Based on patient's history and physical examination KUB and chest x-ray were obtained to evaluate for any foreign body or obstructive gas pattern.  Patient was also given Zofran for symptom control.  He was able to eat without any difficulty and had no foreign body noted.  Patient was discharged with Zofran prescription and given return precautions regarding food poisoning.    External records reviewed: recent inpatient, clinic, and prior ED notes  Diagnostic imaging independently reviewed/interpreted by me (as reflected in MDM) includes: KUB, CXR  Social Determinants Affecting Care: Mental health issues  Discussion of management with other providers: attending  Prescription Drug Consideration: zofran  Escalation of Care: none    Impression:   Abdominal Pain  Nausea    Disposition: Discharge      Procedures ?  SmartLinks last updated 10/4/2024 11:48 PM        Maria De Jesus Earl MD  Resident  10/05/24 0052       Lit Varela MD  10/05/24 9733

## 2024-10-06 VITALS
DIASTOLIC BLOOD PRESSURE: 55 MMHG | HEIGHT: 69 IN | RESPIRATION RATE: 20 BRPM | SYSTOLIC BLOOD PRESSURE: 145 MMHG | BODY MASS INDEX: 44.43 KG/M2 | OXYGEN SATURATION: 95 % | HEART RATE: 78 BPM | TEMPERATURE: 98.4 F | WEIGHT: 300 LBS

## 2024-10-06 LAB
FLUAV RNA RESP QL NAA+PROBE: NOT DETECTED
FLUBV RNA RESP QL NAA+PROBE: NOT DETECTED
SARS-COV-2 RNA RESP QL NAA+PROBE: NOT DETECTED

## 2024-10-06 PROCEDURE — 2500000001 HC RX 250 WO HCPCS SELF ADMINISTERED DRUGS (ALT 637 FOR MEDICARE OP): Mod: SE

## 2024-10-06 PROCEDURE — 2500000002 HC RX 250 W HCPCS SELF ADMINISTERED DRUGS (ALT 637 FOR MEDICARE OP, ALT 636 FOR OP/ED): Mod: SE

## 2024-10-06 RX ORDER — BENZTROPINE MESYLATE 1 MG/1
1 TABLET ORAL NIGHTLY
Qty: 30 TABLET | Refills: 0 | Status: SHIPPED | OUTPATIENT
Start: 2024-10-06 | End: 2024-11-05

## 2024-10-06 RX ORDER — RISPERIDONE 2 MG/1
3 TABLET ORAL DAILY
Qty: 45 TABLET | Refills: 0 | Status: SHIPPED | OUTPATIENT
Start: 2024-10-06 | End: 2024-11-05

## 2024-10-06 RX ORDER — BENZTROPINE MESYLATE 1 MG/1
1 TABLET ORAL ONCE
Status: COMPLETED | OUTPATIENT
Start: 2024-10-06 | End: 2024-10-06

## 2024-10-06 RX ORDER — OLANZAPINE 20 MG/1
20 TABLET ORAL NIGHTLY
COMMUNITY
Start: 2024-08-17 | End: 2024-10-06

## 2024-10-06 RX ORDER — BENZONATATE 100 MG/1
100 CAPSULE ORAL EVERY 8 HOURS
Qty: 9 CAPSULE | Refills: 0 | Status: SHIPPED | OUTPATIENT
Start: 2024-10-06 | End: 2024-10-10

## 2024-10-06 RX ORDER — TALC
2 POWDER (GRAM) TOPICAL NIGHTLY
COMMUNITY
Start: 2024-02-07

## 2024-10-06 RX ORDER — AMLODIPINE BESYLATE 5 MG/1
5 TABLET ORAL DAILY
Qty: 30 TABLET | Refills: 0 | Status: SHIPPED | OUTPATIENT
Start: 2024-10-06 | End: 2024-11-05

## 2024-10-06 RX ORDER — TRAZODONE HYDROCHLORIDE 150 MG/1
150 TABLET ORAL NIGHTLY
Qty: 30 TABLET | Refills: 0 | Status: SHIPPED | OUTPATIENT
Start: 2024-10-06 | End: 2024-11-05

## 2024-10-06 RX ORDER — OLANZAPINE 20 MG/1
20 TABLET ORAL NIGHTLY
Qty: 30 TABLET | Refills: 0 | Status: SHIPPED | OUTPATIENT
Start: 2024-10-06 | End: 2024-11-05

## 2024-10-06 RX ORDER — HALOPERIDOL DECANOATE 100 MG/ML
225 INJECTION INTRAMUSCULAR
COMMUNITY
Start: 2024-02-22

## 2024-10-06 RX ORDER — OLANZAPINE 5 MG/1
20 TABLET ORAL ONCE
Status: COMPLETED | OUTPATIENT
Start: 2024-10-06 | End: 2024-10-06

## 2024-10-06 RX ORDER — ALBUTEROL SULFATE 90 UG/1
2 INHALANT RESPIRATORY (INHALATION) EVERY 4 HOURS PRN
Qty: 8.5 G | Refills: 0 | Status: SHIPPED | OUTPATIENT
Start: 2024-10-06

## 2024-10-06 RX ORDER — DIVALPROEX SODIUM 250 MG/1
500 TABLET, FILM COATED, EXTENDED RELEASE ORAL ONCE
Status: COMPLETED | OUTPATIENT
Start: 2024-10-06 | End: 2024-10-06

## 2024-10-06 RX ORDER — DIVALPROEX SODIUM 500 MG/1
500 TABLET, FILM COATED, EXTENDED RELEASE ORAL 2 TIMES DAILY
Qty: 60 TABLET | Refills: 0 | Status: SHIPPED | OUTPATIENT
Start: 2024-10-06 | End: 2024-11-05

## 2024-10-06 ASSESSMENT — PAIN SCALES - GENERAL: PAINLEVEL_OUTOF10: 5 - MODERATE PAIN

## 2024-10-06 NOTE — ED TRIAGE NOTES
Patient presents to ED with complaint of hallucinations that are making him angry. Venkat HI/SI but does feel violent. Patient also has a cough that started yesterday.

## 2024-10-06 NOTE — ED PROVIDER NOTES
History of Present Illness     History provided by: Patient  Limitations to History: None Identified  External Records Reviewed with Brief Summary: Previous ED visits/recent PCP notes for PMH     HPI:  Patient is a 44-year-old male with past medical history of schizophrenia, hypertension, homelessness, polysubstance use disorder presenting for evaluation of hallucinations as well as URI symptoms.  He was seen here yesterday for upset stomach after eating spoiled food out of the trash.  He denies any SI or HI but does state that he is having auditory hallucinations.  No ongoing nausea or vomiting.  Does endorse body aches.  No fevers or chills.    Physical Exam   Triage vitals:  T 37.1 °C (98.8 °F)  HR 79  /87  RR 20  O2 97 % None (Room air)    General: Awake, alert, in no acute distress  Eyes: Gaze conjugate.  No scleral icterus or injection  HENT: Normo-cephalic, atraumatic. No stridor  CV: RRR. Radial/PT pulses 2+ bilaterally  Resp: Breathing non-labored, speaking in full sentences.  Clear to auscultation bilaterally  GI: Soft, non-distended, non-tender. No rebound or guarding.  : Deferred  MSK/Extremities: No gross bony deformities. Moving all extremities  Skin: Warm. Appropriate color  Neuro: Alert. Oriented. Face symmetric. Speech is fluent.  Gross strength and sensation intact in b/l UE and LEs  Psych: Pressured speech with flat affect.  Endorses auditory hallucinations without SI HI    Medical Decision Making & ED Course   Medical Decision Making:  MDM:    Anibal Caro is a 44 y.o. male with a past medical history of Schizophrenia hypertension polysubstance use disorder and housing insecurity who presents for cocaine intoxication requesting help for detox.  Pt was endorsing auditory hallucinations which is his baseline.  He states that he takes psychiatric medications though has been out for a day or 2, no SI HI or command hallucinations.  Patient was given home Cogentin and Zyprexa.  Will  obtain medical reconciliation to ensure correct psychiatric medications are represcribed.. Low concern for medical etiology including infection, metabolic, dementia, delirium, or drug induced psychosis in the setting of cocaine intoxication.  Patient having ongoing cough and COVID and flu test were obtained as well as a chest x-ray all of which showed no acute processes.  Patient did have episode of tachycardia that improved, with p.o. fluids and rest.  Will continue to monitor while in the ED the overall medical clearance labs and EKG were obtained and unremarkable.  Patient was discussed with [facility] who will attempt placement for this patient.  ----      EKG Independent Interpretation: EKG interpreted by myself. Please see ED Course and [facility] for full interpretation.    Independent Result Review and Interpretation: Results were independently reviewed and interpreted by myself. Please see ED course and [facility] for full interpretation.    Chronic conditions affecting the patient's care: As documented in the [facility]    Care Considerations: As per [facility]    ED Course:  ED Course as of 10/06/24 0909   Sat Oct 05, 2024   0817 Electrocardiogram, 12-lead  Sinus tachycardia with a heart rate of 102 IA interval 120 MS QRS 86 MS QTc 466 MS within normal limits no acute ST segment elevations or T wave inversions concerning for acute ischemia no arrhythmia or heart block. [SC]   2323 Patient seen and evaluated along with resident, please see their documentation for further details.  Patient's presenting to the ED due to increasing hallucinations, states that they are otherwise at his baseline, denies any suicidal homicidal ideations.  He states that the hallucinations are making him upset.  He states over the last day to 2 days he has been having a cough and increasing shortness of breath.  Denies any fevers or chills.  Patient actively coughing on exam.  Has associated body aches.  Plan basic labs, urine screening for possible EPAT  evaluation versus [facility] evaluation, will check COVID and flu an swabs.  Chest x-ray was obtained and limited secondary to penetration/body habitus. [LP]   Sun Oct 06, 2024   0014 CBC and Auto Differential(!) [SC]   0014 Comprehensive Metabolic Panel(!)  No acute leukocytosis anemia thrombocytopenia no acute renal hepatic or electrolyte abnormalities [SC]   0014 XR chest 1 view  No acute cardiopulmonary process [SC]   0018 Sars-CoV-2 PCR  COVID and flu are negative [SC]      ED Course User Index  [LP] Rosanne Vegas DO  [SC] Berenice Dillon DO         Diagnoses as of 10/06/24 0909   Crack cocaine use     Disposition   Patient was signed out to oncoming ED care provider pending [facility] evaluation and placement.    Procedures   Procedures    Patient seen and discussed with ED attending physician.    Berenice Dillon DO  PGY-3 Emergency Medicine     Berenice Dillon DO  Resident  10/06/24 0912

## 2024-10-06 NOTE — PROGRESS NOTES
Emergency Department Transition of Care Note       Signout   I received Anibal Caro in signout from Dr. Dillon.  Please see the ED Provider Note for all HPI, PE and MDM up to the time of signout at 7am.  This is in addition to the primary record.    In brief Anibal Caro is an 44 y.o. male presenting for Patient is a 44-year-old male with history of schizophrenia, homelessness, drug abuse who presented initially to the emergency room for psychiatric evaluation as well as a cough, he had a negative flu and COVID test, CBC CMP unremarkable, tox panel was negative, chest x-ray without evidence of pneumonia.  Patient was requesting to speak with [facility], I also waited for a medication reconciliation since patient needed medications refilled but did not know which ones.  I did refill all of patient's current medications, he was instructed to go to [facility] pharmacy to pick them up.  [facility] was notified of need for placement, patient will be discharged for placement by [facility] from the waiting room.    At the time of signout we were awaiting:  Med rec from pharmacy, [facility] evaluation    ED Course & Medical Decision Making   Medical Decision Making:  Under my care, med rec was completed, all meds were filled including tessalon pearles and sent to [facility], [facility] was consulted (left VM since they are not here at this time) and patient [facility]  .  XR chest 1 view   Final Result   Limited quality images due to the patient's body habitus and   diminished inspiration.  No definite acute cardiopulmonary disease.   Signed by Edward Hoffman        Labs Reviewed   CBC WITH AUTO DIFFERENTIAL - Abnormal       Result Value    WBC 6.6      nRBC 0.0      RBC 5.55      Hemoglobin 15.3      Hematocrit 47.6      MCV 86      MCH 27.6      MCHC 32.1      RDW 13.6      Platelets 258      Neutrophils % 69.9      Immature Granulocytes %, Automated 0.2      Lymphocytes % 14.8      Monocytes % 12.1       Eosinophils % 2.1      Basophils % 0.9      Neutrophils Absolute 4.62      Immature Granulocytes Absolute, Automated 0.01      Lymphocytes Absolute 0.98 (*)     Monocytes Absolute 0.80      Eosinophils Absolute 0.14      Basophils Absolute 0.06     COMPREHENSIVE METABOLIC PANEL - Abnormal    Glucose 75      Sodium 134 (*)     Potassium 4.1      Chloride 100      Bicarbonate 24      Anion Gap 14      Urea Nitrogen 13      Creatinine 1.04      eGFR >90      Calcium 9.2      Albumin 3.8      Alkaline Phosphatase 77      Total Protein 7.2      AST 19      Bilirubin, Total 0.3      ALT 19     ACUTE TOXICOLOGY PANEL, BLOOD - Normal    Acetaminophen <10.0      Salicylate  <3      Alcohol <10     INFLUENZA A AND B PCR - Normal    Flu A Result Not Detected      Flu B Result Not Detected      Narrative:     This assay is an in vitro diagnostic multiplex nucleic acid amplification test for the detection and discrimination of Influenza A & B from nasopharyngeal specimens, and has been validated for use at [facility]. Negative results do not preclude Influenza A/B infections, and should not be used as the sole basis for diagnosis, treatment, or other management decisions. If Influenza A/B and RSV PCR results are negative, testing for Parainfluenza virus, Adenovirus and Metapneumovirus is routinely performed for [facility] pediatric oncology and intensive care inpatients, and is available on other patients by placing an add-on request.   SARS-COV-2 PCR - Normal    Coronavirus 2019, PCR Not Detected      Narrative:     This assay has received FDA Emergency Use Authorization (EUA) and is only authorized for the duration of time that circumstances exist to justify the authorization of the emergency use of in vitro diagnostic tests for the detection of SARS-CoV-2 virus and/or diagnosis of COVID-19 infection under section 564(b)(1) of the Act, 21 U.S.C. 360bbb-3(b)(1). This assay is an in vitro diagnostic nucleic acid  amplification test for the qualitative detection of SARS-CoV-2 from nasopharyngeal specimens and has been validated for use at [facility]. Negative results do not preclude COVID-19 infections and should not be used as the sole basis for diagnosis, treatment, or other management decisions.       ED Course as of 10/06/24 1246   Sat Oct 05, 2024   2247 Electrocardiogram, 12-lead  Sinus tachycardia with a heart rate of 102 MO interval 120 MS QRS 86 MS QTc 466 MS within normal limits no acute ST segment elevations or T wave inversions concerning for acute ischemia no arrhythmia or heart block. [SC]   2328 Patient seen and evaluated along with resident, please see their documentation for further details.  Patient's presenting to the ED due to increasing hallucinations, states that they are otherwise at his baseline, denies any suicidal homicidal ideations.  He states that the hallucinations are making him upset.  He states over the last day to 2 days he has been having a cough and increasing shortness of breath.  Denies any fevers or chills.  Patient actively coughing on exam.  Has associated body aches.  Plan basic labs, urine screening for possible EPAT evaluation versus [facility] evaluation, will check COVID and flu an swabs.  Chest x-ray was obtained and limited secondary to penetration/body habitus. [LP]   Sun Oct 06, 2024   0014 CBC and Auto Differential(!) [SC]   0014 Comprehensive Metabolic Panel(!)  No acute leukocytosis anemia thrombocytopenia no acute renal hepatic or electrolyte abnormalities [SC]   0014 XR chest 1 view  No acute cardiopulmonary process [SC]   0018 Sars-CoV-2 PCR  COVID and flu are negative [SC]      ED Course User Index  [LP] Rosanne Vegas DO  [SC] Berenice Dillon DO         Diagnoses as of 10/06/24 1246   Crack cocaine use   Medication refill       ED Course:  ED Course as of 10/06/24 1244   Sat Oct 05, 2024   2247 Electrocardiogram, 12-lead  Sinus tachycardia with a  heart rate of 102 [place] interval 120 MS QRS 86 MS QTc 466 MS within normal limits no acute ST segment elevations or T wave inversions concerning for acute ischemia no arrhythmia or heart block. [SC]   2328 Patient seen and evaluated along with resident, please see their documentation for further details.  Patient's presenting to the ED due to increasing hallucinations, states that they are otherwise at his baseline, denies any suicidal homicidal ideations.  He states that the hallucinations are making him upset.  He states over the last day to 2 days he has been having a cough and increasing shortness of breath.  Denies any fevers or chills.  Patient actively coughing on exam.  Has associated body aches.  Plan basic labs, urine screening for possible EPAT evaluation versus [facility] evaluation, will check COVID and flu an swabs.  Chest x-ray was obtained and limited secondary to penetration/body habitus. [LP]   Sun Oct 06, 2024   0014 CBC and Auto Differential(!) [SC]   0014 Comprehensive Metabolic Panel(!)  No acute leukocytosis anemia thrombocytopenia no acute renal hepatic or electrolyte abnormalities [SC]   0014 XR chest 1 view  No acute cardiopulmonary process [SC]   0018 Sars-CoV-2 PCR  COVID and flu are negative [SC]      ED Course User Index  [LP] Rosanne Vegas DO  [SC] Berenice Dillon DO         Diagnoses as of 10/06/24 1244   Crack cocaine use   Medication refill       Disposition   As a result of the work-up, the patient was discharged home.  he was informed of his diagnosis and instructed to come back with any concerns or worsening of condition.  he and was agreeable to the plan as discussed above.  he was given the opportunity to ask questions.  All of the patient's questions were answered.    Procedures   Procedures    This was a shared visit with an ED attending.  The patient was seen and discussed with the ED attending Sophia Padgett PA-C  Emergency Medicine

## 2024-10-06 NOTE — PROGRESS NOTES
Anibal Caro is a 44 y.o. male. Patient in the ED earlier today and was med/psych cleared, and discharged. Patient was also told by ED staff that he would not be able to perform.     10/06/24 1519   Discharge Planning   Living Arrangements Alone   Assistance Needed Safe discharge planning; going home -  [id number]  [facility] [address]   Type of Residence Private residence   Number of Stairs to Enter Residence 5   Number of Stairs Within Residence 12   Do you have animals or pets at home? No   Who is requesting discharge planning? Patient   Home or Post Acute Services None;Other (Comment)  (Patient declined additional services)   Expected Discharge Disposition Home   Does the patient need discharge transport arranged? Yes   RoundTrip coordination needed? Yes   Has discharge transport been arranged? No   What day is the transport expected? 10/06/24     HERMELINDO met with patient, who was outside of the ED lobby in a wheelchair. Patient explains that he is ready for home (rideshare transport); confirms he does not have IV in. HERMELINDO followed up with  medical providers, who are agreeable. HERMELINDO will provide ETA when available.

## 2024-10-06 NOTE — PROGRESS NOTES
Pharmacy Medication History Review    Anibal Caro is a 44 y.o. male admitted for No Principal Problem: There is no principal problem currently on the Problem List. Please update the Problem List and refresh.. Pharmacy reviewed the patient's [id number] medications and allergies for accuracy.    Medications ADDED:  Olanzapine 20mg  Haloperidol 100mg  Medications CHANGED:  None  Medications REMOVED:   None    The list below reflects the updated PTA list.   Prior to Admission Medications   Prescriptions Last Dose Informant   OLANZapine (ZyPREXA) 20 mg tablet  Self   Sig: Take 1 tablet (20 mg) by mouth once daily at bedtime.   acetaminophen (Tylenol) 500 mg tablet     Sig: Take 1 tablet (500 mg) by mouth every 6 hours if needed for mild pain (1 - 3) for up to 7 days.   albuterol 90 mcg/actuation inhaler  Self   Sig: Inhale every 4 hours if needed.   amLODIPine (Norvasc) 5 mg tablet  Self   Sig: Take 1 tablet (5 mg) by mouth once daily.   benztropine (Cogentin) 1 mg tablet     Sig: Take 1 tablet (1 mg) by mouth once daily at bedtime.   cholecalciferol (Vitamin D-3) 5,000 Units tablet  Self   Sig: Take 1 tablet (125 mcg) by mouth once daily.   divalproex (Depakote) 500 mg EC tablet  Self   Sig: Take 1 tablet (500 mg) by mouth twice a day.   ergocalciferol (Vitamin D-2) 1.25 MG (36217 UT) capsule  Self   Sig: Take 1 capsule (50,000 Units) by mouth.   gabapentin (Neurontin) 300 mg capsule  Self   Sig: Take by mouth.   haloperidol decanoate (Haldol Decanoate) 100 mg/mL injection  Self   Sig: Inject 2.25 mL (225 mg) into the muscle every 28 (twenty-eight) days.   hydrOXYzine HCL (Atarax) 50 mg tablet  Self   Sig: Take by mouth 2 times a day as needed.   ibuprofen 600 mg tablet     Sig: Take 1 tablet (600 mg) by mouth every 6 hours if needed for mild pain (1 - 3) for up to 7 days.   melatonin 3 mg tablet  Self   Sig: Take 2 tablets (6 mg) by mouth once daily at bedtime.   naltrexone ER (Vivitrol) injection  Self  "  Sig: Inject 4 mL (380 mg) into the muscle.   ondansetron (Zofran) 4 mg tablet  Self   Sig: Take 1 tablet (4 mg) by mouth every 6 hours for 3 days.   risperiDONE (RisperDAL) 2 mg tablet  Self   Sig: Take 1.5 tablets (3 mg) by mouth.   traZODone (Desyrel) 150 mg tablet  Self   Sig: Take by mouth.      Facility-Administered Medications: None        The list below reflects the updated allergy list. Please review each documented allergy for additional clarification and justification.  Allergies  Reviewed by Nickolas Hammer on 10/6/2024        Severity Reactions Comments    Amoxicillin Low Unknown, Rash GI upset    Bee Pollen Low Rash Other reaction(s): Intolerance    Grass Pollen Low Hives Sneezing    Pollen Extracts Low Itching Other reaction(s): Intolerance            Patient was unable to be assessed for M2B at discharge.     Sources:   Very Poor Historian patient interview   Dispense History   No OARRS Hx  9/5/24 psychiatry note used [facility]  8/7/24 [facility] emergency summary     Additional Comments:  Poor Historian patient interview  Pt reports he has not taken any medicine for a month or so   Pt was unsure of last doses on all medications and directions       NICKOLAS HAMMER  Pharmacy Technician  10/06/24     Secure Chat preferred   If no response call [id number] or Pinstant Karma \"Med Rec\"    "

## 2024-10-19 ENCOUNTER — APPOINTMENT (OUTPATIENT)
Dept: RADIOLOGY | Facility: HOSPITAL | Age: 44
End: 2024-10-19
Payer: COMMERCIAL

## 2024-10-19 ENCOUNTER — HOSPITAL ENCOUNTER (EMERGENCY)
Facility: HOSPITAL | Age: 44
Discharge: HOME | End: 2024-10-20
Attending: EMERGENCY MEDICINE
Payer: COMMERCIAL

## 2024-10-19 VITALS
SYSTOLIC BLOOD PRESSURE: 173 MMHG | HEART RATE: 98 BPM | DIASTOLIC BLOOD PRESSURE: 68 MMHG | OXYGEN SATURATION: 94 % | RESPIRATION RATE: 22 BRPM | TEMPERATURE: 98 F

## 2024-10-19 DIAGNOSIS — J18.9 PNEUMONIA OF RIGHT LOWER LOBE DUE TO INFECTIOUS ORGANISM: Primary | ICD-10-CM

## 2024-10-19 DIAGNOSIS — Z76.0 MEDICATION REFILL: ICD-10-CM

## 2024-10-19 PROCEDURE — 99284 EMERGENCY DEPT VISIT MOD MDM: CPT | Mod: 25

## 2024-10-19 PROCEDURE — 87636 SARSCOV2 & INF A&B AMP PRB: CPT | Performed by: EMERGENCY MEDICINE

## 2024-10-19 PROCEDURE — 2500000001 HC RX 250 WO HCPCS SELF ADMINISTERED DRUGS (ALT 637 FOR MEDICARE OP): Mod: SE

## 2024-10-19 PROCEDURE — 71046 X-RAY EXAM CHEST 2 VIEWS: CPT

## 2024-10-19 PROCEDURE — 71046 X-RAY EXAM CHEST 2 VIEWS: CPT | Performed by: SURGERY

## 2024-10-19 RX ORDER — TRAZODONE HYDROCHLORIDE 150 MG/1
150 TABLET ORAL NIGHTLY
Qty: 30 TABLET | Refills: 0 | Status: SHIPPED | OUTPATIENT
Start: 2024-10-19 | End: 2024-11-18

## 2024-10-19 RX ORDER — GABAPENTIN 100 MG/1
300 CAPSULE ORAL 3 TIMES DAILY
Qty: 90 CAPSULE | Refills: 0 | Status: SHIPPED | OUTPATIENT
Start: 2024-10-19

## 2024-10-19 RX ORDER — BENZTROPINE MESYLATE 1 MG/1
1 TABLET ORAL NIGHTLY
Qty: 30 TABLET | Refills: 0 | Status: SHIPPED | OUTPATIENT
Start: 2024-10-19 | End: 2024-11-18

## 2024-10-19 RX ORDER — DOXYCYCLINE 100 MG/1
100 TABLET ORAL 2 TIMES DAILY
Qty: 14 TABLET | Refills: 0 | Status: SHIPPED | OUTPATIENT
Start: 2024-10-19 | End: 2024-10-26

## 2024-10-19 RX ORDER — AMLODIPINE BESYLATE 5 MG/1
5 TABLET ORAL ONCE
Status: COMPLETED | OUTPATIENT
Start: 2024-10-19 | End: 2024-10-19

## 2024-10-19 RX ORDER — RISPERIDONE 2 MG/1
3 TABLET ORAL DAILY
Qty: 45 TABLET | Refills: 0 | Status: SHIPPED | OUTPATIENT
Start: 2024-10-19 | End: 2024-11-18

## 2024-10-19 RX ORDER — GUAIFENESIN 100 MG/5ML
200 SOLUTION ORAL ONCE
Status: COMPLETED | OUTPATIENT
Start: 2024-10-19 | End: 2024-10-19

## 2024-10-19 RX ORDER — AMOXICILLIN AND CLAVULANATE POTASSIUM 875; 125 MG/1; MG/1
1 TABLET, FILM COATED ORAL ONCE
Status: COMPLETED | OUTPATIENT
Start: 2024-10-19 | End: 2024-10-19

## 2024-10-19 RX ORDER — DOXYCYCLINE HYCLATE 100 MG
100 TABLET ORAL ONCE
Status: COMPLETED | OUTPATIENT
Start: 2024-10-19 | End: 2024-10-19

## 2024-10-19 RX ORDER — BENZONATATE 100 MG/1
200 CAPSULE ORAL ONCE
Status: COMPLETED | OUTPATIENT
Start: 2024-10-19 | End: 2024-10-19

## 2024-10-19 RX ORDER — GABAPENTIN 300 MG/1
300 CAPSULE ORAL EVERY 8 HOURS SCHEDULED
Status: DISCONTINUED | OUTPATIENT
Start: 2024-10-19 | End: 2024-10-20 | Stop reason: HOSPADM

## 2024-10-19 RX ORDER — DIVALPROEX SODIUM 250 MG/1
500 TABLET, FILM COATED, EXTENDED RELEASE ORAL ONCE
Status: COMPLETED | OUTPATIENT
Start: 2024-10-19 | End: 2024-10-19

## 2024-10-19 RX ORDER — DIVALPROEX SODIUM 500 MG/1
500 TABLET, FILM COATED, EXTENDED RELEASE ORAL 2 TIMES DAILY
Qty: 60 TABLET | Refills: 11 | Status: SHIPPED | OUTPATIENT
Start: 2024-10-19 | End: 2025-10-19

## 2024-10-19 RX ORDER — BENZONATATE 100 MG/1
200 CAPSULE ORAL EVERY 8 HOURS
Qty: 42 CAPSULE | Refills: 0 | Status: SHIPPED | OUTPATIENT
Start: 2024-10-19 | End: 2024-10-26

## 2024-10-19 RX ORDER — AMLODIPINE BESYLATE 5 MG/1
5 TABLET ORAL DAILY
Qty: 30 TABLET | Refills: 0 | Status: SHIPPED | OUTPATIENT
Start: 2024-10-19 | End: 2024-11-18

## 2024-10-19 RX ORDER — OLANZAPINE 20 MG/1
20 TABLET ORAL NIGHTLY
Qty: 30 TABLET | Refills: 0 | Status: SHIPPED | OUTPATIENT
Start: 2024-10-19 | End: 2024-11-18

## 2024-10-19 RX ORDER — AMOXICILLIN AND CLAVULANATE POTASSIUM 875; 125 MG/1; MG/1
1 TABLET, FILM COATED ORAL EVERY 12 HOURS
Qty: 14 TABLET | Refills: 0 | Status: SHIPPED | OUTPATIENT
Start: 2024-10-19 | End: 2024-10-26

## 2024-10-19 RX ORDER — IBUPROFEN 200 MG
1 TABLET ORAL DAILY
Status: DISCONTINUED | OUTPATIENT
Start: 2024-10-20 | End: 2024-10-20 | Stop reason: HOSPADM

## 2024-10-19 RX ADMIN — AMLODIPINE BESYLATE 5 MG: 5 TABLET ORAL at 23:03

## 2024-10-19 RX ADMIN — GUAIFENESIN 200 MG: 200 SOLUTION ORAL at 21:40

## 2024-10-19 RX ADMIN — GABAPENTIN 300 MG: 300 CAPSULE ORAL at 23:03

## 2024-10-19 RX ADMIN — AMOXICILLIN AND CLAVULANATE POTASSIUM 1 TABLET: 875; 125 TABLET, FILM COATED ORAL at 23:13

## 2024-10-19 RX ADMIN — BENZONATATE 200 MG: 100 CAPSULE ORAL at 21:40

## 2024-10-19 RX ADMIN — DIVALPROEX SODIUM 500 MG: 250 TABLET, EXTENDED RELEASE ORAL at 23:04

## 2024-10-19 RX ADMIN — DOXYCYCLINE HYCLATE 100 MG: 100 TABLET, COATED ORAL at 23:04

## 2024-10-19 SDOH — SOCIAL STABILITY: SOCIAL INSECURITY: FAMILY BEHAVIORS: COOPERATIVE;VERBAL

## 2024-10-19 SDOH — HEALTH STABILITY: MENTAL HEALTH: BEHAVIORAL HEALTH(WDL): EXCEPTIONS TO WDL

## 2024-10-19 ASSESSMENT — LIFESTYLE VARIABLES
TOTAL SCORE: 0
HAVE YOU EVER FELT YOU SHOULD CUT DOWN ON YOUR DRINKING: NO
EVER HAD A DRINK FIRST THING IN THE MORNING TO STEADY YOUR NERVES TO GET RID OF A HANGOVER: NO
HAVE PEOPLE ANNOYED YOU BY CRITICIZING YOUR DRINKING: NO
EVER FELT BAD OR GUILTY ABOUT YOUR DRINKING: NO

## 2024-10-19 ASSESSMENT — PAIN - FUNCTIONAL ASSESSMENT: PAIN_FUNCTIONAL_ASSESSMENT: 0-10

## 2024-10-19 ASSESSMENT — PAIN SCALES - GENERAL: PAINLEVEL_OUTOF10: 0 - NO PAIN

## 2024-10-20 NOTE — ED PROVIDER NOTES
CC: Illness and Addiction Problem     HPI:  Patient is a 44-year-old male with a past medical history of schizophrenia, hypertension, homelessness, and polysubstance use disorder who presented to the ED for crack use and cough.  Patient was noted be using crack today and called EMS.  Patient noted that he wanted substance rehabilitation services.  Patient notes a chronic cough that has been acutely worsening.  Notes that his cough will be intermittently productive with sputum.  Denied fevers, chills, trauma, falls, chest pain, difficulty breathing, headache, abdominal pain, neck pain, back pain, dysuria, and abnormal bowel movements.    Limitations to history: None  Independent historian(s): Patient and EMS  Records Reviewed: Recent available ED and inpatient notes reviewed in EMR.    PMHx/PSHx:  Per HPI.   - has a past medical history of Acute (undifferentiated) schizophrenia (Multi).  - has a past surgical history that includes CT angio aorta and bilateral iliofemoral runoff w and or wo IV contrast (11/3/2022).    Medications:  Reviewed in EMR. See EMR for complete list of medications and doses.    Allergies:  Amoxicillin, Bee pollen, Grass pollen, and Pollen extracts    Social History:  - Tobacco:  reports that he has never smoked. He has never used smokeless tobacco.   - Alcohol:  reports no history of alcohol use.   - Illicit Drugs:  has no history on file for drug use.     ROS:  Per HPI.       ???????????????????????????????????????????????????????????????  Triage Vitals:  T 36.5 °C (97.7 °F)  HR 98  BP (!) 169/92  RR (!) 22  O2 95 %      Physical Exam  Vitals and nursing note reviewed.   Constitutional:       General: He is not in acute distress.     Comments: Harsh cough   HENT:      Head: Normocephalic and atraumatic.      Nose: Nose normal.      Mouth/Throat:      Mouth: Mucous membranes are moist.   Eyes:      Conjunctiva/sclera: Conjunctivae normal.   Cardiovascular:      Rate and Rhythm: Normal rate  and regular rhythm.      Pulses: Normal pulses.      Comments: Coarse breath sounds diffusely.  Pulmonary:      Effort: Pulmonary effort is normal. No respiratory distress.      Breath sounds: Normal breath sounds.   Abdominal:      Palpations: Abdomen is soft.      Tenderness: There is no abdominal tenderness.   Skin:     General: Skin is warm.   Neurological:      General: No focal deficit present.      Mental Status: He is alert.         ???????????????????????????????????????????????????????????????  Labs:   Labs Reviewed   SARS-COV-2 PCR - Normal       Result Value    Coronavirus 2019, PCR Not Detected      Narrative:     This assay has received FDA Emergency Use Authorization (EUA) and is only authorized for the duration of time that circumstances exist to justify the authorization of the emergency use of in vitro diagnostic tests for the detection of SARS-CoV-2 virus and/or diagnosis of COVID-19 infection under section 564(b)(1) of the Act, 21 U.S.C. 360bbb-3(b)(1). This assay is an in vitro diagnostic nucleic acid amplification test for the qualitative detection of SARS-CoV-2 from nasopharyngeal specimens and has been validated for use at [facility]. Negative results do not preclude COVID-19 infections and should not be used as the sole basis for diagnosis, treatment, or other management decisions.     INFLUENZA A AND B PCR - Normal    Flu A Result Not Detected      Flu B Result Not Detected      Narrative:     This assay is an in vitro diagnostic multiplex nucleic acid amplification test for the detection and discrimination of Influenza A & B from nasopharyngeal specimens, and has been validated for use at [facility]. Negative results do not preclude Influenza A/B infections, and should not be used as the sole basis for diagnosis, treatment, or other management decisions. If Influenza A/B and RSV PCR results are negative, testing for Parainfluenza virus,  Adenovirus and Metapneumovirus is routinely performed for [facility] pediatric oncology and intensive care inpatients, and is available on other patients by placing an add-on request.        Imaging:   XR chest 2 views              MDM:  Patient is a 44-year-old male with a past medical history of schizophrenia, hypertension, homelessness, and polysubstance use disorder who presented to the ED for crack use and cough.  Patient presented HDS.  Physical exam findings significant for coarse breath sounds diffusely and a harsh cough.  Low clinical concern for sepsis, pneumothorax, PE, ACS, or traumatic process.  Patient ambulated and tolerated p.o.  Patient to sat 94% on walking pulse ox.  Patient administered Robitussin and Tessalon Perles.  COVID and flu testing were negative. CXR displayed findings concerning for pneumonia.  Patient has noted to have an allergy to amoxicillin in the chart.  Patient notes that he has previously tolerated amoxicillin.  Patient administered Augmentin and doxycycline.  Patient tolerated Augmentin without any reaction.  Patient administered home medications including antipsychotics amlodipine.  [facility] was consulted for the patient.  [facility] placed patient.  Patient prescribed home medications as well as Augmentin and doxycycline.  Patient ordered referral to primary care.  Discussed ED findings, plan for [facility] placement for substance rehabilitation, follow-up with primary care within the next week, and strict return precautions for any new or worsening symptoms.  Patient stated understanding and agreement the plan.  All questions were answered.  Patient discharged in stable condition.    ED Course:  ED Course as of 10/21/24 1801   Sat Oct 19, 2024   2251 Chart reviewed states the patient has previously had an amoxicillin allergy.  Patient notes that he has previously tolerated amoxicillin.  [MH]      ED Course User Index  [MH] Chris Yoon MD         Diagnoses as of 10/21/24 1801   Pneumonia  of right lower lobe due to infectious organism       Social Determinants Limiting Care:  None identified    Disposition:  Discharge    Chris Yoon MD   Emergency Medicine PGY-3  [facility]    Comment: Please note this report has been produced using speech recognition software and may contain errors related to that system including errors in grammar, punctuation, and spelling as well as words and phrases that may be inappropriate.  If there are any questions or concerns please feel free to contact the dictating provider for clarification.    Procedures ? SmartLinks last updated 10/19/2024 10:43 PM        Chris Yoon MD  Resident  10/21/24 8619

## 2024-10-20 NOTE — DISCHARGE INSTRUCTIONS
You presented to the ED for cough.  You are noted to have pneumonia on chest x-ray.  Please take your antibiotics as prescribed.  You were seen by [facility].  [facility] place you at a facility.  Please return to the emergency department for any new or worsening symptoms including elevated to difficulty breathing and chest pain.

## 2024-10-21 ENCOUNTER — APPOINTMENT (OUTPATIENT)
Dept: RADIOLOGY | Facility: HOSPITAL | Age: 44
End: 2024-10-21
Payer: COMMERCIAL

## 2024-10-21 ENCOUNTER — HOSPITAL ENCOUNTER (EMERGENCY)
Facility: HOSPITAL | Age: 44
Discharge: HOME | End: 2024-10-21
Attending: EMERGENCY MEDICINE
Payer: COMMERCIAL

## 2024-10-21 VITALS
WEIGHT: 299.83 LBS | HEIGHT: 69 IN | SYSTOLIC BLOOD PRESSURE: 146 MMHG | BODY MASS INDEX: 44.41 KG/M2 | HEART RATE: 84 BPM | OXYGEN SATURATION: 99 % | DIASTOLIC BLOOD PRESSURE: 90 MMHG | TEMPERATURE: 98.2 F | RESPIRATION RATE: 16 BRPM

## 2024-10-21 DIAGNOSIS — M25.561 CHRONIC PAIN OF BOTH KNEES: Primary | ICD-10-CM

## 2024-10-21 DIAGNOSIS — G89.29 CHRONIC PAIN OF BOTH KNEES: Primary | ICD-10-CM

## 2024-10-21 DIAGNOSIS — M25.562 CHRONIC PAIN OF BOTH KNEES: Primary | ICD-10-CM

## 2024-10-21 PROCEDURE — 73560 X-RAY EXAM OF KNEE 1 OR 2: CPT | Mod: BILATERAL PROCEDURE | Performed by: RADIOLOGY

## 2024-10-21 PROCEDURE — 2500000001 HC RX 250 WO HCPCS SELF ADMINISTERED DRUGS (ALT 637 FOR MEDICARE OP): Mod: SE

## 2024-10-21 PROCEDURE — 96372 THER/PROPH/DIAG INJ SC/IM: CPT

## 2024-10-21 PROCEDURE — 99284 EMERGENCY DEPT VISIT MOD MDM: CPT | Mod: 25

## 2024-10-21 PROCEDURE — 2500000004 HC RX 250 GENERAL PHARMACY W/ HCPCS (ALT 636 FOR OP/ED): Mod: SE

## 2024-10-21 PROCEDURE — 73560 X-RAY EXAM OF KNEE 1 OR 2: CPT | Mod: 50

## 2024-10-21 RX ORDER — IBUPROFEN 600 MG/1
600 TABLET ORAL EVERY 6 HOURS PRN
Qty: 28 TABLET | Refills: 0 | Status: SHIPPED | OUTPATIENT
Start: 2024-10-21 | End: 2024-10-28

## 2024-10-21 RX ORDER — LIDOCAINE 560 MG/1
1 PATCH PERCUTANEOUS; TOPICAL; TRANSDERMAL DAILY
Qty: 10 PATCH | Refills: 0 | Status: SHIPPED | OUTPATIENT
Start: 2024-10-21 | End: 2024-10-31

## 2024-10-21 RX ORDER — ACETAMINOPHEN 325 MG/1
975 TABLET ORAL ONCE
Status: COMPLETED | OUTPATIENT
Start: 2024-10-21 | End: 2024-10-21

## 2024-10-21 RX ORDER — CYCLOBENZAPRINE HCL 10 MG
10 TABLET ORAL 2 TIMES DAILY PRN
Qty: 20 TABLET | Refills: 0 | Status: SHIPPED | OUTPATIENT
Start: 2024-10-21 | End: 2024-10-31

## 2024-10-21 RX ORDER — KETOROLAC TROMETHAMINE 15 MG/ML
15 INJECTION, SOLUTION INTRAMUSCULAR; INTRAVENOUS ONCE
Status: COMPLETED | OUTPATIENT
Start: 2024-10-21 | End: 2024-10-21

## 2024-10-21 ASSESSMENT — PAIN SCALES - GENERAL: PAINLEVEL_OUTOF10: 8

## 2024-10-21 NOTE — ED PROVIDER NOTES
CC: Knee Pain     HPI:   Patient is a 44-year-old male who is well-known to this emergency department with history of schizophrenia, hypertension, homelessness, polysubstance use disorder presenting due to concerns of bilateral knee pain.  Patient reports that he needs a wheelchair as his knees have been hurting him more and more.  Patient reports that he fell on his knees but has still been ambulatory.  He denies any head strike or loss of consciousness. He denies any pain anywhere else. Patient denies any recent fevers, chills and has no open wounds.  No headache, dizziness, vision changes, neck pain, back pain, cough, chest pain, shortness of breath, nausea, vomiting, abdominal pain, diarrhea, constipation, urinary symptoms, numbness, tingling, fevers, or chills. Patient was able to walk into the emergency department without difficulty. Patient denies any suicidal or homicidal ideation. He denies any hallucinations.     Limitations to History: mental health  Additional History Obtained from: none    PMHx/PSHx:  Per HPI.   - has a past medical history of Acute (undifferentiated) schizophrenia (Multi).  - has a past surgical history that includes CT angio aorta and bilateral iliofemoral runoff w and or wo IV contrast (11/3/2022).    Social History:  - Tobacco:  reports that he has never smoked. He has never used smokeless tobacco.   - Alcohol:  reports no history of alcohol use.   - Drugs:  has no history on file for drug use.     Medications: Reviewed in EMR.     Allergies:  Amoxicillin, Bee pollen, Grass pollen, and Pollen extracts    ???????????????????????????????????????????????????????????????  Triage Vitals:  T 36.8 °C (98.2 °F)  HR 84  /90  RR 16  O2 99 % None (Room air)    Physical Exam  Vitals and nursing note reviewed.   Constitutional:       General: He is not in acute distress.     Appearance: He is well-developed. He is obese.   HENT:      Head: Normocephalic and atraumatic.       Mouth/Throat:      Mouth: Mucous membranes are moist.   Eyes:      Extraocular Movements: Extraocular movements intact.      Conjunctiva/sclera: Conjunctivae normal.      Pupils: Pupils are equal, round, and reactive to light.   Cardiovascular:      Rate and Rhythm: Normal rate and regular rhythm.      Heart sounds: No murmur heard.  Pulmonary:      Effort: Pulmonary effort is normal. No respiratory distress.      Breath sounds: Normal breath sounds. No wheezing.   Abdominal:      General: There is no distension.      Palpations: Abdomen is soft.      Tenderness: There is no abdominal tenderness. There is no guarding or rebound.   Musculoskeletal:         General: No swelling, tenderness or deformity. Normal range of motion.      Cervical back: Normal range of motion and neck supple.      Comments: Calves soft and nontender. Compartments are soft. No tenderness to palpation to all extremities. No erythema. Neurovascularly intact throughout.    Skin:     General: Skin is warm and dry.      Capillary Refill: Capillary refill takes less than 2 seconds.      Findings: No rash.   Neurological:      Mental Status: He is alert and oriented to person, place, and time.      Cranial Nerves: No cranial nerve deficit.      Sensory: No sensory deficit.      Motor: No weakness.      Gait: Gait normal.   Psychiatric:         Mood and Affect: Mood normal.      Comments: Denies suicidal or homicidal ideation. Denies hallucinations.        ???????????????????????????????????????????????????????????????  EKG (per my interpretation):  none    ED Course  ED Course as of 10/23/24 2210   Mon Oct 21, 2024   0600 Stable degenerative knee changes   [RR]      ED Course User Index  [RR] Maria De Jesus Earl MD         Diagnoses as of 10/23/24 2210   Chronic pain of both knees       Medical Decision Making:  Patient is a 44-year-old male who is well-known to this emergency department with history of schizophrenia, hypertension, homelessness,  polysubstance use disorder presenting due to concerns of bilateral knee pain.  Differentials considered is broad and includes but not limited to gout, septic arthritis, chronic osteoarthritis, cellulitis, fracture, dislocation, musculoskeletal pain, sprain/strain.    Based on patient's history and physical exam I have low suspicion at this time for septic arthritis or gout given the fact that patient does not have a history of either and has full intact range of motion without any overlying erythema, warmth, or swelling noted. No fluctuance. He has no overlying tenderness to palpation of the knees.  X-rays of bilateral knees were obtained that were negative for fracture or dislocation.  He has chronic osteoarthritis and degenerative changes. Patient was given tylenol and IM toradol to treat his pain in the ED with improvement. The patent remains stable, neurovascularly intact. Patient was informed of the results. He is ambulatory with steady gait. The patient was discharged with multimodal pain therapy. The patient felt comfortable being discharged. The patient was instructed of supportive measures and to follow-up with a primary care physician. Return precautions were provided, for which the patient expressed understanding. The patient was discharged in stable condition. They should feel free to return to the Emergency Department at any time should their condition worsen or should they have any questions or concerns.     External records reviewed: recent inpatient, clinic, and prior ED notes  Diagnostic imaging independently reviewed/interpreted by me (as reflected in MDM) includes: xray knees  Social Determinants Affecting Care: Difficulty paying for/obtaining medications, Financial difficulties, Housing insecurity, and Mental health issues  Discussion of management with other providers: attending  Prescription Drug Consideration: motrin, lidocaine patch, flexaril  Escalation of Care: none    Impression:   Knee  Pain    Disposition: Discharge      Procedures ? SmartLinks last updated 10/21/2024 6:43 AM        Maria De Jesus Earl MD  Resident  10/21/24 2787    I saw and evaluated the patient. I personally obtained the key and critical portions of the history and physical exam or was physically present for key and critical portions performed by the resident/fellow. I reviewed the resident/fellow's documentation and discussed the patient with the resident/fellow. I agree with the resident/fellow's medical decision making as documented in the note.    MD Mirta Brewer MD  10/23/24 9334

## 2024-10-21 NOTE — DISCHARGE INSTRUCTIONS
You were seen today due to concerns of chronic knee pain. He had a fall. You do not have any fractures or dislocations.  Please take your multimodal pain therapy and follow RICE therapy.  Return if you have any worsening symptoms

## 2024-10-23 ENCOUNTER — HOSPITAL ENCOUNTER (EMERGENCY)
Facility: HOSPITAL | Age: 44
Discharge: HOME | End: 2024-10-23
Payer: COMMERCIAL

## 2024-10-23 VITALS
HEART RATE: 100 BPM | OXYGEN SATURATION: 100 % | HEIGHT: 68 IN | BODY MASS INDEX: 47.74 KG/M2 | SYSTOLIC BLOOD PRESSURE: 139 MMHG | WEIGHT: 315 LBS | DIASTOLIC BLOOD PRESSURE: 70 MMHG | TEMPERATURE: 97 F | RESPIRATION RATE: 18 BRPM

## 2024-10-23 DIAGNOSIS — R51.9 ACUTE NONINTRACTABLE HEADACHE, UNSPECIFIED HEADACHE TYPE: Primary | ICD-10-CM

## 2024-10-23 DIAGNOSIS — F14.90 CRACK COCAINE USE: ICD-10-CM

## 2024-10-23 PROCEDURE — 99283 EMERGENCY DEPT VISIT LOW MDM: CPT | Performed by: NURSE PRACTITIONER

## 2024-10-23 PROCEDURE — 99282 EMERGENCY DEPT VISIT SF MDM: CPT

## 2024-10-23 PROCEDURE — 2500000001 HC RX 250 WO HCPCS SELF ADMINISTERED DRUGS (ALT 637 FOR MEDICARE OP): Mod: SE | Performed by: NURSE PRACTITIONER

## 2024-10-23 RX ORDER — ACETAMINOPHEN 325 MG/1
975 TABLET ORAL ONCE
Status: COMPLETED | OUTPATIENT
Start: 2024-10-23 | End: 2024-10-23

## 2024-10-23 RX ORDER — ACETAMINOPHEN 325 MG/1
650 TABLET ORAL EVERY 6 HOURS PRN
Qty: 30 TABLET | Refills: 0 | OUTPATIENT
Start: 2024-10-23 | End: 2024-10-28

## 2024-10-23 ASSESSMENT — PAIN - FUNCTIONAL ASSESSMENT: PAIN_FUNCTIONAL_ASSESSMENT: 0-10

## 2024-10-23 ASSESSMENT — PAIN DESCRIPTION - LOCATION: LOCATION: HEAD

## 2024-10-23 ASSESSMENT — PAIN DESCRIPTION - PAIN TYPE: TYPE: ACUTE PAIN

## 2024-10-23 ASSESSMENT — PAIN SCALES - GENERAL
PAINLEVEL_OUTOF10: 5 - MODERATE PAIN
PAINLEVEL_OUTOF10: 7

## 2024-10-23 ASSESSMENT — ENCOUNTER SYMPTOMS: HEADACHES: 1

## 2024-10-24 NOTE — ED PROVIDER NOTES
Emergency Department Encounter  [facility] EMERGENCY MEDICINE    Patient: Anibal Caro  MRN: [id number]  : 1980  Date of Evaluation: 10/23/2024  ED Provider: RIGOBERTO Nash      Chief Complaint       Chief Complaint   Patient presents with    Headache        Limitations to History: none  Historian: patient  Records reviewed: EMR inpatient and outpatient notes, Care Everywhere    This is a 44-year-old male with a PMH of schizophrenia, hypertension, polysubstance use who presents to the emergency room with a headache.  Patient states that he developed a diffuse headache after smoking crack earlier today.  Patient states that he had a nosebleed as well.  Nosebleed resolved and patient still has a mild headache.  Denies any nausea, vomiting, recent fall or injury.  Patient does not take any blood thinners.  Denies taking any pain medications prior to arrival.  Patient describes her headache as a sharp, constant pain rating it a 10 out of 10. Denies any SI or HI.  Denies any chest pain or shortness of breath.    PMH: Schizophrenia, HTN, polysubstance use  PSH: Denies  Allergies: Reviewed per EMR  Social HX: + smoker, occasional alcohol use, + crack use  Family HX: No family history pertinent to current presenting problem  Medications: Reviewed per EMR    ROS:     Review of Systems   Neurological:  Positive for headaches.     14 systems reviewed and otherwise acutely negative except as in the [place].        Past History     Past Medical History:   Diagnosis Date    Acute (undifferentiated) schizophrenia (Multi)      Past Surgical History:   Procedure Laterality Date    CT AORTA AND BILATERAL ILIOFEMORAL RUNOFF ANGIOGRAM W AND/OR WO IV CONTRAST  11/3/2022    CT AORTA AND BILATERAL ILIOFEMORAL RUNOFF ANGIOGRAM W AND/OR WO IV CONTRAST 11/3/2022 [facility]         Medications/Allergies     Previous Medications    ALBUTEROL 90 MCG/ACTUATION INHALER    Inhale 2 puffs every 4 hours if  needed for wheezing or shortness of breath.    AMLODIPINE (NORVASC) 5 MG TABLET    Take 1 tablet (5 mg) by mouth once daily.    AMLODIPINE (NORVASC) 5 MG TABLET    Take 1 tablet (5 mg) by mouth once daily.    AMOXICILLIN-POT CLAVULANATE (AUGMENTIN) 875-125 MG TABLET    Take 1 tablet by mouth every 12 hours for 7 days.    BENZONATATE (TESSALON) 100 MG CAPSULE    Take 2 capsules (200 mg) by mouth every 8 hours for 7 days. Do not crush or chew.    BENZTROPINE (COGENTIN) 1 MG TABLET    Take 1 tablet (1 mg) by mouth once daily at bedtime.    CHOLECALCIFEROL (VITAMIN D-3) 5,000 UNITS TABLET    Take 1 tablet (125 mcg) by mouth once daily.    CYCLOBENZAPRINE (FLEXERIL) 10 MG TABLET    Take 1 tablet (10 mg) by mouth 2 times a day as needed for muscle spasms for up to 10 days.    DIVALPROEX (DEPAKOTE ER) 500 MG 24 HR TABLET    Take 1 tablet (500 mg) by mouth 2 times a day. Do not crush, chew, or split.    DIVALPROEX (DEPAKOTE ER) 500 MG 24 HR TABLET    Take 1 tablet (500 mg) by mouth 2 times a day. Do not crush, chew, or split.    DIVALPROEX (DEPAKOTE) 500 MG EC TABLET    Take 1 tablet (500 mg) by mouth twice a day.    DOXYCYCLINE (ADOXA) 100 MG TABLET    Take 1 tablet (100 mg) by mouth 2 times a day for 7 days. Take with a full glass of water and do not lie down for at least 30 minutes after    GABAPENTIN (NEURONTIN) 100 MG CAPSULE    Take 3 capsules (300 mg) by mouth 3 times a day.    GABAPENTIN (NEURONTIN) 300 MG CAPSULE    Take by mouth.    HALOPERIDOL DECANOATE (HALDOL DECANOATE) 100 MG/ML INJECTION    Inject 2.25 mL (225 mg) into the muscle every 28 (twenty-eight) days.    HYDROXYZINE HCL (ATARAX) 50 MG TABLET    Take by mouth 2 times a day as needed.    IBUPROFEN 600 MG TABLET    Take 1 tablet (600 mg) by mouth every 6 hours if needed for mild pain (1 - 3) for up to 7 days.    LIDOCAINE 4 % PATCH    Place 1 patch over 12 hours on the skin once daily for 10 days. Remove & discard patch within 12 hours or as directed by  MD. Place over area of pain    MELATONIN 3 MG TABLET    Take 2 tablets (6 mg) by mouth once daily at bedtime.    NALTREXONE ER (VIVITROL) INJECTION    Inject 4 mL (380 mg) into the muscle.    OLANZAPINE (ZYPREXA) 20 MG TABLET    Take 1 tablet (20 mg) by mouth once daily at bedtime.    RISPERIDONE (RISPERDAL) 2 MG TABLET    Take 1.5 tablets (3 mg) by mouth once daily.    TRAZODONE (DESYREL) 150 MG TABLET    Take 1 tablet (150 mg) by mouth once daily at bedtime.     Allergies   Allergen Reactions    Amoxicillin Unknown and Rash     GI upset    Bee Pollen Rash     Other reaction(s): Intolerance    Grass Pollen Hives     Sneezing    Pollen Extracts Itching     Other reaction(s): Intolerance        Physical Exam       ED Triage Vitals [10/23/24 2018]   Temperature Heart Rate Respirations BP   36.1 °C (97 °F) 100 18 139/70      Pulse Ox Temp Source Heart Rate Source Patient Position   100 % Temporal -- --      BP Location FiO2 (%)     -- --       Physical Exam:    Appearance: Alert, oriented , cooperative,  in no acute distress.     Skin: Intact,  dry skin, no lesions, rash, petechiae or purpura.     Eyes: PERRLA, EOMs intact.    ENT: Hearing grossly intact. External auditory canals patent, tympanic membranes intact with visible landmarks. Nares patent, mucus membranes moist. Dentition without lesions. Pharynx clear, uvula midline.     Neck: Supple, without meningismus.     Pulmonary: Clear bilaterally with good chest wall excursion. No rales, rhonchi or wheezing. No accessory muscle use or stridor.    Cardiac: Normal S1, S2 without murmur, rub, gallop or extrasystole. No JVD, Carotids without bruits.    Abdomen: Soft, nontender, active bowel sounds.  No palpable organomegaly.  No rebound or guarding.      Musculoskeletal: Full range of motion. No deformity.     Neurological:  Normal sensation, no weakness, no focal findings identified.    Psychiatric: Appropriate mood and affect.       Diagnostics   Labs:  No results  "found for this or any previous visit (from the past 24 hours).   Radiographs:  No orders to display             Assessment   In brief, Anibal Caro is a 44 y.o. male who presented to the emergency department with a headache.          ED Course/MDM     Diagnoses as of 10/23/24 2119   Acute nonintractable headache, unspecified headache type   Crack cocaine use      Visit Vitals  /70   Pulse 100   Temp 36.1 °C (97 °F) (Temporal)   Resp 18   Ht 1.727 m (5' 8\")   Wt (!) 168 kg (370 lb)   SpO2 100%   BMI 56.26 kg/m²   Smoking Status Never   BSA 2.84 m²       Medications   acetaminophen (Tylenol) tablet 975 mg (975 mg oral Given 10/23/24 2114)       Patient remained stable while in the emergency department. Previous outpatient and ED records were reviewed. Outside records were reviewed.  Patient did not have any red flag symptoms or neurological deficits on exam.  Patient received Tylenol for symptoms.  Vital signs were stable.  There was no active nosebleed on exam.  Patient was discharged home, advised to follow-up with his primary care doctor and return the emergency room with worsening symptoms.    Final Impression      1. Acute nonintractable headache, unspecified headache type    2. Crack cocaine use          DISPOSITION  Disposition: Discharged home    Comment: Please note this report has been produced using speech recognition software and may contain errors related to that system including errors in grammar, punctuation, and spelling, as well as words and phrases that may be inappropriate.  If there are any questions or concerns please feel free to contact the dictating provider for clarification.    RIGOBERTO Nash APRN-CNP  10/23/24 2120    "

## 2024-10-28 ENCOUNTER — HOSPITAL ENCOUNTER (EMERGENCY)
Facility: HOSPITAL | Age: 44
Discharge: HOME | End: 2024-10-28
Attending: STUDENT IN AN ORGANIZED HEALTH CARE EDUCATION/TRAINING PROGRAM
Payer: COMMERCIAL

## 2024-10-28 VITALS
OXYGEN SATURATION: 98 % | HEIGHT: 68 IN | TEMPERATURE: 96.8 F | RESPIRATION RATE: 18 BRPM | WEIGHT: 300 LBS | HEART RATE: 87 BPM | BODY MASS INDEX: 45.47 KG/M2 | DIASTOLIC BLOOD PRESSURE: 105 MMHG | SYSTOLIC BLOOD PRESSURE: 156 MMHG

## 2024-10-28 DIAGNOSIS — M25.532 WRIST PAIN, CHRONIC, LEFT: Primary | ICD-10-CM

## 2024-10-28 DIAGNOSIS — G89.29 WRIST PAIN, CHRONIC, LEFT: Primary | ICD-10-CM

## 2024-10-28 PROCEDURE — 96372 THER/PROPH/DIAG INJ SC/IM: CPT

## 2024-10-28 PROCEDURE — 99284 EMERGENCY DEPT VISIT MOD MDM: CPT | Performed by: STUDENT IN AN ORGANIZED HEALTH CARE EDUCATION/TRAINING PROGRAM

## 2024-10-28 PROCEDURE — 99283 EMERGENCY DEPT VISIT LOW MDM: CPT

## 2024-10-28 PROCEDURE — 2500000004 HC RX 250 GENERAL PHARMACY W/ HCPCS (ALT 636 FOR OP/ED): Mod: SE

## 2024-10-28 PROCEDURE — 2500000001 HC RX 250 WO HCPCS SELF ADMINISTERED DRUGS (ALT 637 FOR MEDICARE OP): Mod: SE

## 2024-10-28 RX ORDER — IBUPROFEN 600 MG/1
600 TABLET ORAL EVERY 6 HOURS PRN
Qty: 28 TABLET | Refills: 0 | Status: SHIPPED | OUTPATIENT
Start: 2024-10-28 | End: 2024-11-04

## 2024-10-28 RX ORDER — KETOROLAC TROMETHAMINE 15 MG/ML
15 INJECTION, SOLUTION INTRAMUSCULAR; INTRAVENOUS ONCE
Status: COMPLETED | OUTPATIENT
Start: 2024-10-28 | End: 2024-10-28

## 2024-10-28 RX ORDER — ACETAMINOPHEN 325 MG/1
975 TABLET ORAL ONCE
Status: COMPLETED | OUTPATIENT
Start: 2024-10-28 | End: 2024-10-28

## 2024-10-28 RX ORDER — ACETAMINOPHEN 325 MG/1
650 TABLET ORAL EVERY 6 HOURS PRN
Qty: 30 TABLET | Refills: 0 | OUTPATIENT
Start: 2024-10-28 | End: 2024-10-31

## 2024-10-30 ENCOUNTER — HOSPITAL ENCOUNTER (EMERGENCY)
Facility: HOSPITAL | Age: 44
Discharge: HOME | End: 2024-10-30
Payer: COMMERCIAL

## 2024-10-30 VITALS
BODY MASS INDEX: 47.74 KG/M2 | HEIGHT: 68 IN | WEIGHT: 315 LBS | TEMPERATURE: 98.3 F | RESPIRATION RATE: 20 BRPM | DIASTOLIC BLOOD PRESSURE: 90 MMHG | HEART RATE: 95 BPM | SYSTOLIC BLOOD PRESSURE: 136 MMHG | OXYGEN SATURATION: 95 %

## 2024-10-30 PROCEDURE — 4500999001 HC ED NO CHARGE

## 2024-10-30 ASSESSMENT — PAIN DESCRIPTION - PAIN TYPE: TYPE: ACUTE PAIN

## 2024-10-30 ASSESSMENT — LIFESTYLE VARIABLES
TOTAL SCORE: 0
EVER HAD A DRINK FIRST THING IN THE MORNING TO STEADY YOUR NERVES TO GET RID OF A HANGOVER: NO
HAVE PEOPLE ANNOYED YOU BY CRITICIZING YOUR DRINKING: NO
EVER FELT BAD OR GUILTY ABOUT YOUR DRINKING: NO
HAVE YOU EVER FELT YOU SHOULD CUT DOWN ON YOUR DRINKING: NO

## 2024-10-30 ASSESSMENT — PAIN SCALES - GENERAL: PAINLEVEL_OUTOF10: 10 - WORST POSSIBLE PAIN

## 2024-10-30 ASSESSMENT — PAIN DESCRIPTION - ORIENTATION: ORIENTATION: LEFT

## 2024-10-30 ASSESSMENT — PAIN DESCRIPTION - LOCATION: LOCATION: KNEE

## 2024-10-30 ASSESSMENT — PAIN DESCRIPTION - PROGRESSION: CLINICAL_PROGRESSION: NOT CHANGED

## 2024-10-30 ASSESSMENT — PAIN DESCRIPTION - DESCRIPTORS: DESCRIPTORS: ACHING

## 2024-10-30 ASSESSMENT — PAIN - FUNCTIONAL ASSESSMENT: PAIN_FUNCTIONAL_ASSESSMENT: 0-10

## 2024-10-31 ENCOUNTER — HOSPITAL ENCOUNTER (EMERGENCY)
Facility: HOSPITAL | Age: 44
Discharge: HOME | End: 2024-10-31
Attending: EMERGENCY MEDICINE
Payer: COMMERCIAL

## 2024-10-31 VITALS
WEIGHT: 315 LBS | TEMPERATURE: 97.7 F | DIASTOLIC BLOOD PRESSURE: 103 MMHG | HEART RATE: 78 BPM | HEIGHT: 68 IN | OXYGEN SATURATION: 97 % | BODY MASS INDEX: 47.74 KG/M2 | SYSTOLIC BLOOD PRESSURE: 153 MMHG | RESPIRATION RATE: 18 BRPM

## 2024-10-31 VITALS
HEART RATE: 88 BPM | DIASTOLIC BLOOD PRESSURE: 87 MMHG | TEMPERATURE: 98.6 F | HEIGHT: 68 IN | OXYGEN SATURATION: 95 % | RESPIRATION RATE: 18 BRPM | SYSTOLIC BLOOD PRESSURE: 147 MMHG | BODY MASS INDEX: 47.74 KG/M2 | WEIGHT: 315 LBS

## 2024-10-31 DIAGNOSIS — S39.012A LUMBAR STRAIN, INITIAL ENCOUNTER: Primary | ICD-10-CM

## 2024-10-31 DIAGNOSIS — R82.998 CALCIUM OXALATE CRYSTALS IN URINE: ICD-10-CM

## 2024-10-31 DIAGNOSIS — M54.50 CHRONIC LOW BACK PAIN WITHOUT SCIATICA, UNSPECIFIED BACK PAIN LATERALITY: Primary | ICD-10-CM

## 2024-10-31 DIAGNOSIS — G89.29 CHRONIC LOW BACK PAIN WITHOUT SCIATICA, UNSPECIFIED BACK PAIN LATERALITY: Primary | ICD-10-CM

## 2024-10-31 LAB
APPEARANCE UR: CLEAR
BILIRUB UR STRIP.AUTO-MCNC: NEGATIVE MG/DL
CAOX CRY #/AREA UR COMP ASSIST: ABNORMAL /HPF
COLOR UR: YELLOW
GLUCOSE UR STRIP.AUTO-MCNC: NORMAL MG/DL
KETONES UR STRIP.AUTO-MCNC: NEGATIVE MG/DL
LEUKOCYTE ESTERASE UR QL STRIP.AUTO: NEGATIVE
MUCOUS THREADS #/AREA URNS AUTO: ABNORMAL /LPF
NITRITE UR QL STRIP.AUTO: NEGATIVE
PH UR STRIP.AUTO: 6 [PH]
PROT UR STRIP.AUTO-MCNC: NORMAL MG/DL
RBC # UR STRIP.AUTO: NEGATIVE /UL
RBC #/AREA URNS AUTO: ABNORMAL /HPF
SP GR UR STRIP.AUTO: 1.03
SQUAMOUS #/AREA URNS AUTO: ABNORMAL /HPF
UROBILINOGEN UR STRIP.AUTO-MCNC: NORMAL MG/DL
WBC #/AREA URNS AUTO: ABNORMAL /HPF

## 2024-10-31 PROCEDURE — 81001 URINALYSIS AUTO W/SCOPE: CPT

## 2024-10-31 PROCEDURE — 99283 EMERGENCY DEPT VISIT LOW MDM: CPT

## 2024-10-31 PROCEDURE — 2500000004 HC RX 250 GENERAL PHARMACY W/ HCPCS (ALT 636 FOR OP/ED): Mod: SE

## 2024-10-31 PROCEDURE — 99284 EMERGENCY DEPT VISIT MOD MDM: CPT | Performed by: EMERGENCY MEDICINE

## 2024-10-31 PROCEDURE — 2500000001 HC RX 250 WO HCPCS SELF ADMINISTERED DRUGS (ALT 637 FOR MEDICARE OP): Mod: SE

## 2024-10-31 PROCEDURE — 2500000005 HC RX 250 GENERAL PHARMACY W/O HCPCS: Mod: SE

## 2024-10-31 PROCEDURE — 96372 THER/PROPH/DIAG INJ SC/IM: CPT

## 2024-10-31 RX ORDER — ACETAMINOPHEN 325 MG/1
975 TABLET ORAL ONCE
Status: COMPLETED | OUTPATIENT
Start: 2024-10-31 | End: 2024-10-31

## 2024-10-31 RX ORDER — LIDOCAINE 560 MG/1
1 PATCH PERCUTANEOUS; TOPICAL; TRANSDERMAL ONCE
Status: DISCONTINUED | OUTPATIENT
Start: 2024-10-31 | End: 2024-11-01 | Stop reason: HOSPADM

## 2024-10-31 RX ORDER — ACETAMINOPHEN 325 MG/1
650 TABLET ORAL EVERY 6 HOURS PRN
Qty: 60 TABLET | Refills: 0 | Status: SHIPPED | OUTPATIENT
Start: 2024-10-31 | End: 2024-11-14

## 2024-10-31 RX ORDER — KETOROLAC TROMETHAMINE 15 MG/ML
15 INJECTION, SOLUTION INTRAMUSCULAR; INTRAVENOUS ONCE
Status: COMPLETED | OUTPATIENT
Start: 2024-10-31 | End: 2024-10-31

## 2024-10-31 RX ORDER — IBUPROFEN 400 MG/1
400 TABLET ORAL ONCE
Status: COMPLETED | OUTPATIENT
Start: 2024-10-31 | End: 2024-10-31

## 2024-10-31 ASSESSMENT — LIFESTYLE VARIABLES
HAVE YOU EVER FELT YOU SHOULD CUT DOWN ON YOUR DRINKING: NO
HAVE PEOPLE ANNOYED YOU BY CRITICIZING YOUR DRINKING: NO
EVER FELT BAD OR GUILTY ABOUT YOUR DRINKING: NO
TOTAL SCORE: 0
EVER HAD A DRINK FIRST THING IN THE MORNING TO STEADY YOUR NERVES TO GET RID OF A HANGOVER: NO

## 2024-10-31 ASSESSMENT — COLUMBIA-SUICIDE SEVERITY RATING SCALE - C-SSRS
2. HAVE YOU ACTUALLY HAD ANY THOUGHTS OF KILLING YOURSELF?: NO
6. HAVE YOU EVER DONE ANYTHING, STARTED TO DO ANYTHING, OR PREPARED TO DO ANYTHING TO END YOUR LIFE?: NO
1. IN THE PAST MONTH, HAVE YOU WISHED YOU WERE DEAD OR WISHED YOU COULD GO TO SLEEP AND NOT WAKE UP?: NO
2. HAVE YOU ACTUALLY HAD ANY THOUGHTS OF KILLING YOURSELF?: NO
6. HAVE YOU EVER DONE ANYTHING, STARTED TO DO ANYTHING, OR PREPARED TO DO ANYTHING TO END YOUR LIFE?: NO
1. IN THE PAST MONTH, HAVE YOU WISHED YOU WERE DEAD OR WISHED YOU COULD GO TO SLEEP AND NOT WAKE UP?: NO

## 2024-10-31 ASSESSMENT — PAIN - FUNCTIONAL ASSESSMENT
PAIN_FUNCTIONAL_ASSESSMENT: 0-10
PAIN_FUNCTIONAL_ASSESSMENT: 0-10

## 2024-10-31 ASSESSMENT — PAIN DESCRIPTION - LOCATION
LOCATION: BACK
LOCATION: BACK

## 2024-10-31 ASSESSMENT — PAIN SCALES - GENERAL
PAINLEVEL_OUTOF10: 6
PAINLEVEL_OUTOF10: 10 - WORST POSSIBLE PAIN

## 2024-10-31 ASSESSMENT — PAIN DESCRIPTION - PAIN TYPE: TYPE: ACUTE PAIN

## 2024-11-01 LAB — HOLD SPECIMEN: NORMAL

## 2024-11-06 ENCOUNTER — HOSPITAL ENCOUNTER (EMERGENCY)
Facility: HOSPITAL | Age: 44
Discharge: HOME | End: 2024-11-06
Payer: COMMERCIAL

## 2024-11-06 PROCEDURE — 4500999001 HC ED NO CHARGE

## 2024-11-13 ENCOUNTER — HOSPITAL ENCOUNTER (EMERGENCY)
Facility: HOSPITAL | Age: 44
Discharge: HOME | End: 2024-11-13
Payer: COMMERCIAL

## 2024-11-13 VITALS
HEIGHT: 68 IN | SYSTOLIC BLOOD PRESSURE: 145 MMHG | OXYGEN SATURATION: 96 % | BODY MASS INDEX: 45.47 KG/M2 | HEART RATE: 85 BPM | RESPIRATION RATE: 16 BRPM | TEMPERATURE: 97 F | WEIGHT: 300 LBS | DIASTOLIC BLOOD PRESSURE: 92 MMHG

## 2024-11-13 PROCEDURE — 4500999001 HC ED NO CHARGE

## 2024-11-13 ASSESSMENT — PAIN SCALES - GENERAL: PAINLEVEL_OUTOF10: 0 - NO PAIN

## 2024-11-13 ASSESSMENT — PAIN - FUNCTIONAL ASSESSMENT: PAIN_FUNCTIONAL_ASSESSMENT: 0-10

## 2024-11-17 ENCOUNTER — HOSPITAL ENCOUNTER (EMERGENCY)
Facility: HOSPITAL | Age: 44
Discharge: HOME | End: 2024-11-17
Attending: STUDENT IN AN ORGANIZED HEALTH CARE EDUCATION/TRAINING PROGRAM
Payer: COMMERCIAL

## 2024-11-17 VITALS
TEMPERATURE: 96.8 F | RESPIRATION RATE: 18 BRPM | HEART RATE: 79 BPM | OXYGEN SATURATION: 98 % | SYSTOLIC BLOOD PRESSURE: 154 MMHG | BODY MASS INDEX: 45.47 KG/M2 | WEIGHT: 300 LBS | HEIGHT: 68 IN | DIASTOLIC BLOOD PRESSURE: 104 MMHG

## 2024-11-17 DIAGNOSIS — R51.9 NONINTRACTABLE HEADACHE, UNSPECIFIED CHRONICITY PATTERN, UNSPECIFIED HEADACHE TYPE: Primary | ICD-10-CM

## 2024-11-17 PROCEDURE — 99283 EMERGENCY DEPT VISIT LOW MDM: CPT

## 2024-11-17 PROCEDURE — 2500000001 HC RX 250 WO HCPCS SELF ADMINISTERED DRUGS (ALT 637 FOR MEDICARE OP)

## 2024-11-17 PROCEDURE — 99283 EMERGENCY DEPT VISIT LOW MDM: CPT | Performed by: STUDENT IN AN ORGANIZED HEALTH CARE EDUCATION/TRAINING PROGRAM

## 2024-11-17 RX ORDER — ACETAMINOPHEN 325 MG/1
975 TABLET ORAL ONCE
Status: COMPLETED | OUTPATIENT
Start: 2024-11-17 | End: 2024-11-17

## 2024-11-17 ASSESSMENT — PAIN DESCRIPTION - LOCATION: LOCATION: HEAD

## 2024-11-17 ASSESSMENT — PAIN - FUNCTIONAL ASSESSMENT: PAIN_FUNCTIONAL_ASSESSMENT: 0-10

## 2024-11-17 ASSESSMENT — PAIN SCALES - GENERAL
PAINLEVEL_OUTOF10: 8
PAINLEVEL_OUTOF10: 8

## 2024-11-17 NOTE — ED TRIAGE NOTES
Brought in via EMS with c/o HA x 1 hr. Pt states he has a nose bleed today. Denies any other symptoms

## 2024-11-17 NOTE — ED PROVIDER NOTES
Emergency Department Provider Note        History of Present Illness     History provided by: Patient  Limitations to History: None    HPI:  Patient is a 44-year-old male presenting to the ED for a headache.  Patient states morning he had a nosebleed that only lasted a few minutes.  Patient states he is out of both nostrils.  Patient states on that he had a frontal headache no blurriness of vision no dizziness no lightheadedness.  Patient denies any trauma to the area.  Physical Exam   Triage vitals:  T 36.1 °C (96.9 °F)  HR 82  /77  RR 16  O2 96 % None (Room air)    Physical Exam  Constitutional:       Appearance: Normal appearance.   HENT:      Head: Normocephalic.   Eyes:      Extraocular Movements: Extraocular movements intact.      Pupils: Pupils are equal, round, and reactive to light.   Cardiovascular:      Rate and Rhythm: Normal rate.   Pulmonary:      Effort: Pulmonary effort is normal.   Abdominal:      General: Abdomen is flat.   Musculoskeletal:         General: Normal range of motion.      Cervical back: Normal range of motion.   Skin:     General: Skin is warm.   Neurological:      Mental Status: He is alert and oriented to person, place, and time. Mental status is at baseline.      GCS: GCS eye subscore is 4. GCS verbal subscore is 5. GCS motor subscore is 6.      Motor: No weakness.      Coordination: Coordination normal.   Psychiatric:         Mood and Affect: Mood normal.         Behavior: Behavior normal.          Medical Decision Making & ED Course   Medical Decision Making:  Patient is a 44-year-old male presented ED for headache.  Patient states he had a nosebleed this morning following that had a frontal headache with no radiation.  Patient denies any blurriness of vision, lightheadedness or dizziness.  Patient is ANO x 4, no nystagmus, has full strength and movement in his extremities and no limb ataxia.  Unable to assess gait as patient states he is in a wheelchair because he has  been injured and cannot walk.  Low concern for any brain bleed or any mass defects and no imaging needed at this time.  This is most likely following his nosebleed and patient was given Tylenol in the ED.  Please see ED course for further details  ----           EKG Independent Interpretation: EKG not obtained        The patient was discussed with the following consultants/services: None      ED Course as of 11/17/24 1318   Sun Nov 17, 2024   1313 Patient reexamined and states feeling better and would like to be discharged.  Patient was discharged [TS]      ED Course User Index  [TS] Annalisa Bustamante MD         Diagnoses as of 11/17/24 1318   Nonintractable headache, unspecified chronicity pattern, unspecified headache type          Disposition   As a result of the work-up, the patient was discharged home.  he was informed of his diagnosis and instructed to come back with any concerns or worsening of condition.  he and was agreeable to the plan as discussed above.  he was given the opportunity to ask questions.  All of the patient's questions were answered.    Procedures   Procedures    Patient seen and discussed with ED attending physician.    Annalisa Bustamante MD  Emergency Medicine       Annalisa Bustamante MD  Resident  11/17/24 1318

## 2024-11-20 ENCOUNTER — APPOINTMENT (OUTPATIENT)
Dept: RADIOLOGY | Facility: HOSPITAL | Age: 44
End: 2024-11-20
Payer: COMMERCIAL

## 2024-11-20 ENCOUNTER — HOSPITAL ENCOUNTER (EMERGENCY)
Facility: HOSPITAL | Age: 44
Discharge: HOME | End: 2024-11-20
Attending: EMERGENCY MEDICINE
Payer: COMMERCIAL

## 2024-11-20 ENCOUNTER — CLINICAL SUPPORT (OUTPATIENT)
Dept: EMERGENCY MEDICINE | Facility: HOSPITAL | Age: 44
End: 2024-11-20
Payer: COMMERCIAL

## 2024-11-20 VITALS
RESPIRATION RATE: 18 BRPM | HEART RATE: 73 BPM | TEMPERATURE: 97.9 F | HEIGHT: 68 IN | BODY MASS INDEX: 45.47 KG/M2 | WEIGHT: 300 LBS | DIASTOLIC BLOOD PRESSURE: 58 MMHG | OXYGEN SATURATION: 98 % | SYSTOLIC BLOOD PRESSURE: 130 MMHG

## 2024-11-20 DIAGNOSIS — F14.90 COCAINE USE: Primary | ICD-10-CM

## 2024-11-20 DIAGNOSIS — M79.604 RIGHT LEG PAIN: ICD-10-CM

## 2024-11-20 LAB
ATRIAL RATE: 80 BPM
P AXIS: 29 DEGREES
P OFFSET: 200 MS
P ONSET: 145 MS
PR INTERVAL: 150 MS
Q ONSET: 220 MS
QRS COUNT: 13 BEATS
QRS DURATION: 86 MS
QT INTERVAL: 412 MS
QTC CALCULATION(BAZETT): 475 MS
QTC FREDERICIA: 453 MS
R AXIS: 54 DEGREES
T AXIS: 28 DEGREES
T OFFSET: 426 MS
VENTRICULAR RATE: 80 BPM

## 2024-11-20 PROCEDURE — 73590 X-RAY EXAM OF LOWER LEG: CPT | Mod: RT

## 2024-11-20 PROCEDURE — 73590 X-RAY EXAM OF LOWER LEG: CPT | Mod: RIGHT SIDE | Performed by: RADIOLOGY

## 2024-11-20 PROCEDURE — 93005 ELECTROCARDIOGRAM TRACING: CPT

## 2024-11-20 PROCEDURE — 2500000001 HC RX 250 WO HCPCS SELF ADMINISTERED DRUGS (ALT 637 FOR MEDICARE OP)

## 2024-11-20 PROCEDURE — 99283 EMERGENCY DEPT VISIT LOW MDM: CPT

## 2024-11-20 RX ORDER — ACETAMINOPHEN 325 MG/1
975 TABLET ORAL ONCE
Status: COMPLETED | OUTPATIENT
Start: 2024-11-20 | End: 2024-11-20

## 2024-11-20 ASSESSMENT — PAIN SCALES - GENERAL: PAINLEVEL_OUTOF10: 7

## 2024-11-20 ASSESSMENT — COLUMBIA-SUICIDE SEVERITY RATING SCALE - C-SSRS
2. HAVE YOU ACTUALLY HAD ANY THOUGHTS OF KILLING YOURSELF?: NO
1. IN THE PAST MONTH, HAVE YOU WISHED YOU WERE DEAD OR WISHED YOU COULD GO TO SLEEP AND NOT WAKE UP?: NO

## 2024-11-20 ASSESSMENT — PAIN DESCRIPTION - ORIENTATION: ORIENTATION: LEFT

## 2024-11-20 ASSESSMENT — PAIN DESCRIPTION - LOCATION: LOCATION: LEG

## 2024-11-20 ASSESSMENT — PAIN - FUNCTIONAL ASSESSMENT: PAIN_FUNCTIONAL_ASSESSMENT: 0-10
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
## 2024-11-21 NOTE — DISCHARGE INSTRUCTIONS
Extremity Injury Instructions: Return to the ED if you develop increased pain in your injured limb, loss of sensation, or change in color of the limb.

## 2024-11-21 NOTE — ED PROVIDER NOTES
"History of Present Illness     History provided by: Patient  Limitations to History: None  External Records Reviewed with Brief Summary:  [facility] ed visit from earlier In the day     HPI:  Anibal Caro is a 44 y.o. male with PMHx of schizphrenia who is presenting with concern for worsening schizophrenia. Patient was resting comfortably. Patient was denying SI, HI. Reports AH and VH but not different from baseline. Does not appear to be internally stimulated. Patient was reporting right leg pain. Denied left leg pain and denied a headache. Patient reports crack cocaine use. Reports interest in getting help.       Physical Exam   Triage vitals:  T 36.6 °C (97.9 °F)  HR 98  /89  RR 16  O2 97 % None (Room air)    General: Awake, alert, in no acute distress  Eyes: Gaze conjugate.  No scleral icterus or injection  HENT: Normo-cephalic, atraumatic. No stridor  CV: Regular rate, regular rhythm. Radial pulses 2+ bilaterally  Resp: Breathing non-labored, speaking in full sentences.  Clear to auscultation bilaterally  GI: Soft, non-distended, non-tender. No rebound or guarding.  MSK/Extremities: No gross bony deformities. Moving all extremities  Skin: Warm. Appropriate color  Neuro: Alert. Oriented. Face symmetric. Speech is fluent.  Gross strength and sensation intact in b/l UE and Les. Ambulating appropriately  Psych: Appropriate mood and affect      Medical Decision Making & ED Course   Medical Decision Makin y.o. male  with a Pmhx of schizphrenia who is presenting with \"worsening schizophrenia.\" Denied SI, HI. Patient was discussed with [facility] who are familiar with the patient and reported that this is his normal presentation and that he was at [facility] this morning for similar complaints. They believe this is his baseline. Patient was discussed with [facility] regarding resources for cocaine use. [facility] was familiar with the patient and reported that they had attempted to help this patient multiple times but he had been " wilner to their uber  and homes in the past. They reported that he left multiple rehab facilities in the past. Patient was offered tylenol and xrays of the leg. Xrays were negative. Patient was discharged home in stable condition. Patient was advised to return if symptoms worsen or don’t improve. Patient was advised to follow up with their PCP as needed.     ----      Differential diagnoses considered include but are not limited to: cocaine use, psychiatric disease, fracture of the right leg     Social Determinants of Health which Significantly Impact Care: None identified     EKG Independent Interpretation: EKG interpreted by myself. Please see ED Course and [facility] for full interpretation.    Independent Result Review and Interpretation: Results were independently reviewed and interpreted by myself. Please see ED course and [facility] for full interpretation.    Chronic conditions affecting the patient's care: As documented in the MDM    The patient was discussed with the following consultants/services: None    Care Considerations: As per [facility]    ED Course:  ED Course as of 11/21/24 0919   Wed Nov 20, 2024   2108 Electrocardiogram, 12-lead  ECG, per my read, with normal sinus rhythm, heart rate 80 bpm, normal axis prolonged QTc, otherwise normal intervals, no T wave inversions, no ST segment abnormalities. No change from prior ECG in June and October 2024, including QTc prolongation. [MB]      ED Course User Index  [MB] Charmaine Lindquist MD         Diagnoses as of 11/21/24 0919   Cocaine use   Right leg pain     Disposition   As a result of the work-up, the patient was discharged home.  he was informed of his diagnosis and instructed to come back with any concerns or worsening of condition.  he and was agreeable to the plan as discussed above.  he was given the opportunity to ask questions.  All of the patient's questions were answered.    Procedures   Procedures    Patient seen and discussed with ED attending  physician.    Shanna West MD  Emergency Medicine  Resident  11/21/24 0926       Charmaine Lindquist MD  11/25/24 0753

## 2024-11-22 PROCEDURE — 99283 EMERGENCY DEPT VISIT LOW MDM: CPT

## 2024-11-23 ENCOUNTER — HOSPITAL ENCOUNTER (EMERGENCY)
Facility: HOSPITAL | Age: 44
Discharge: HOME | End: 2024-11-23
Attending: EMERGENCY MEDICINE
Payer: COMMERCIAL

## 2024-11-23 ENCOUNTER — HOSPITAL ENCOUNTER (EMERGENCY)
Facility: HOSPITAL | Age: 44
Discharge: OTHER INSTITUTION | End: 2024-11-23
Attending: EMERGENCY MEDICINE
Payer: COMMERCIAL

## 2024-11-23 VITALS
WEIGHT: 300 LBS | DIASTOLIC BLOOD PRESSURE: 74 MMHG | HEART RATE: 117 BPM | RESPIRATION RATE: 18 BRPM | OXYGEN SATURATION: 95 % | HEIGHT: 68 IN | TEMPERATURE: 98.5 F | BODY MASS INDEX: 45.47 KG/M2 | SYSTOLIC BLOOD PRESSURE: 123 MMHG

## 2024-11-23 VITALS
RESPIRATION RATE: 18 BRPM | SYSTOLIC BLOOD PRESSURE: 170 MMHG | WEIGHT: 300 LBS | OXYGEN SATURATION: 98 % | BODY MASS INDEX: 45.47 KG/M2 | HEART RATE: 88 BPM | DIASTOLIC BLOOD PRESSURE: 110 MMHG | TEMPERATURE: 98.5 F | HEIGHT: 68 IN

## 2024-11-23 DIAGNOSIS — F20.9 SCHIZOPHRENIA, UNSPECIFIED TYPE: Primary | ICD-10-CM

## 2024-11-23 DIAGNOSIS — Z76.0 MEDICATION REFILL: Primary | ICD-10-CM

## 2024-11-23 PROCEDURE — 99284 EMERGENCY DEPT VISIT MOD MDM: CPT

## 2024-11-23 PROCEDURE — 99281 EMR DPT VST MAYX REQ PHY/QHP: CPT

## 2024-11-23 PROCEDURE — 99283 EMERGENCY DEPT VISIT LOW MDM: CPT

## 2024-11-23 PROCEDURE — 2500000001 HC RX 250 WO HCPCS SELF ADMINISTERED DRUGS (ALT 637 FOR MEDICARE OP)

## 2024-11-23 RX ORDER — AMLODIPINE BESYLATE 5 MG/1
5 TABLET ORAL DAILY
Status: DISCONTINUED | OUTPATIENT
Start: 2024-11-23 | End: 2024-11-23 | Stop reason: HOSPADM

## 2024-11-23 RX ORDER — OLANZAPINE 10 MG/1
20 TABLET, ORALLY DISINTEGRATING ORAL ONCE
Status: DISCONTINUED | OUTPATIENT
Start: 2024-11-23 | End: 2024-11-23 | Stop reason: HOSPADM

## 2024-11-23 RX ORDER — ACETAMINOPHEN 325 MG/1
975 TABLET ORAL ONCE
Status: COMPLETED | OUTPATIENT
Start: 2024-11-23 | End: 2024-11-23

## 2024-11-23 RX ADMIN — ACETAMINOPHEN 975 MG: 325 TABLET ORAL at 01:18

## 2024-11-23 ASSESSMENT — COLUMBIA-SUICIDE SEVERITY RATING SCALE - C-SSRS
6. HAVE YOU EVER DONE ANYTHING, STARTED TO DO ANYTHING, OR PREPARED TO DO ANYTHING TO END YOUR LIFE?: NO
1. IN THE PAST MONTH, HAVE YOU WISHED YOU WERE DEAD OR WISHED YOU COULD GO TO SLEEP AND NOT WAKE UP?: NO
2. HAVE YOU ACTUALLY HAD ANY THOUGHTS OF KILLING YOURSELF?: NO
6. HAVE YOU EVER DONE ANYTHING, STARTED TO DO ANYTHING, OR PREPARED TO DO ANYTHING TO END YOUR LIFE?: NO
2. HAVE YOU ACTUALLY HAD ANY THOUGHTS OF KILLING YOURSELF?: NO
1. IN THE PAST MONTH, HAVE YOU WISHED YOU WERE DEAD OR WISHED YOU COULD GO TO SLEEP AND NOT WAKE UP?: NO

## 2024-11-23 ASSESSMENT — PAIN - FUNCTIONAL ASSESSMENT
PAIN_FUNCTIONAL_ASSESSMENT: 0-10
PAIN_FUNCTIONAL_ASSESSMENT: 0-10

## 2024-11-23 ASSESSMENT — PAIN SCALES - GENERAL
PAINLEVEL_OUTOF10: 5 - MODERATE PAIN
PAINLEVEL_OUTOF10: 0 - NO PAIN
PAINLEVEL_OUTOF10: 0 - NO PAIN
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
## 2024-11-23 NOTE — DISCHARGE INSTRUCTIONS
You were seen in the emergency department and we felt that you are safe to go home. Follow up with your primary doctor and psychiatrist so you can receive your long acting injection. Return to the ED if you have any worsening or change in your hallucinations or have any other concerns.
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
## 2024-11-23 NOTE — ED PROVIDER NOTES
HPI   Chief Complaint   Patient presents with    Med Refill       44-year-old male with history of HTN, schizophrenia, substance use disorder including crack cocaine, EtOH, marijuana, wet/PCP, presents for chief complaints of wanting detox.  States he has been using meds, including crack, and would like to go to detox.  States he has been living on the streets and off of his medications.  He supposed to take Haldol decannulate injection monthly as well as Depakote he says.  Also possibly olanzapine.  States that he has not been going to get his medications for no good reason, per patient.  Denies chest pain or dyspnea.  No nausea or vomiting.  No changes in urination or bowel movement.  States that he would like to talk to [facility].              Patient History   Past Medical History:   Diagnosis Date    Acute (undifferentiated) schizophrenia (Multi)      Past Surgical History:   Procedure Laterality Date    CT ANGIO AORTA AND BILATERAL ILIOFEMORAL RUN OFF INCLUDING WITHOUT CONTRAST IF PERFORMED  11/3/2022    CT AORTA AND BILATERAL ILIOFEMORAL RUNOFF ANGIOGRAM W AND/OR WO IV CONTRAST 11/3/2022 [facility]     No family history on file.  Social History     Tobacco Use    Smoking status: Never    Smokeless tobacco: Never   Vaping Use    Vaping status: Never Used   Substance Use Topics    Alcohol use: Never    Drug use: Not on file       Physical Exam   ED Triage Vitals [11/23/24 0750]   Temperature Heart Rate Respirations BP   36.9 °C (98.5 °F) 88 18 (!) 170/110      Pulse Ox Temp src Heart Rate Source Patient Position   98 % -- -- --      BP Location FiO2 (%)     -- --       Physical Exam  Vitals and nursing note reviewed.   Constitutional:       General: He is not in acute distress.     Appearance: He is well-developed.   HENT:      Head: Normocephalic and atraumatic.   Eyes:      Conjunctiva/sclera: Conjunctivae normal.   Cardiovascular:      Rate and Rhythm: Normal rate and regular rhythm.      Heart  sounds: No murmur heard.  Pulmonary:      Effort: Pulmonary effort is normal. No respiratory distress.      Breath sounds: Normal breath sounds.   Abdominal:      Palpations: Abdomen is soft.      Tenderness: There is no abdominal tenderness.   Musculoskeletal:         General: No swelling.      Cervical back: Neck supple.   Skin:     General: Skin is warm and dry.      Capillary Refill: Capillary refill takes less than 2 seconds.   Neurological:      Mental Status: He is alert.   Psychiatric:         Mood and Affect: Mood normal.         Speech: Speech normal.         Behavior: Behavior normal. Behavior is cooperative.         Thought Content: Thought content normal.      Comments: Clinically sober           ED Course & MDM   Diagnoses as of 11/23/24 0936   Medication refill                 No data recorded     Julius Coma Scale Score: 15 (11/23/24 0749 : Carla Velazco RN)                           Medical Decision Making  Vital signs reviewed, significant for hypertension at 170/110.  Denies any red flag symptoms.  He is overall well-appearing and in no apparent distress.  Speaks full sentences without difficulty.  Clinically sober at this time calm and cooperative asking to go to the detox with [facility].  [facility] was contacted. Dose of olanzapine and amlodipine given.  [facility] were able to see the patient and states that he would be eligible to go to detox likely if he is able to get his medications.  However after [facility] looked into it further, it turns out the patient is banned from [facility], as well as all other known detox centers.  When I went to talk to the patient about this, the nurse stated that he had already left.  Stated that he wanted to go home.  Last seen in stable condition.        Procedure  Procedures     Cali Lozano, ELVIA-CNP  11/23/24 0917

## 2024-12-22 ENCOUNTER — APPOINTMENT (OUTPATIENT)
Dept: RADIOLOGY | Facility: HOSPITAL | Age: 44
End: 2024-12-22
Payer: COMMERCIAL

## 2024-12-22 ENCOUNTER — HOSPITAL ENCOUNTER (EMERGENCY)
Facility: HOSPITAL | Age: 44
Discharge: LEFT BEFORE DISCHARGE | End: 2024-12-22
Payer: COMMERCIAL

## 2024-12-22 ENCOUNTER — HOSPITAL ENCOUNTER (EMERGENCY)
Facility: HOSPITAL | Age: 44
Discharge: HOME | End: 2024-12-22
Attending: STUDENT IN AN ORGANIZED HEALTH CARE EDUCATION/TRAINING PROGRAM
Payer: COMMERCIAL

## 2024-12-22 VITALS
DIASTOLIC BLOOD PRESSURE: 85 MMHG | RESPIRATION RATE: 17 BRPM | WEIGHT: 300 LBS | HEART RATE: 101 BPM | SYSTOLIC BLOOD PRESSURE: 150 MMHG | TEMPERATURE: 97.3 F | BODY MASS INDEX: 45.47 KG/M2 | OXYGEN SATURATION: 95 % | HEIGHT: 68 IN

## 2024-12-22 DIAGNOSIS — S09.90XA HEAD INJURY, INITIAL ENCOUNTER: Primary | ICD-10-CM

## 2024-12-22 DIAGNOSIS — S01.01XA LACERATION OF SCALP, INITIAL ENCOUNTER: ICD-10-CM

## 2024-12-22 LAB
APPEARANCE UR: CLEAR
BILIRUB UR STRIP.AUTO-MCNC: NEGATIVE MG/DL
COLOR UR: YELLOW
GLUCOSE UR STRIP.AUTO-MCNC: NORMAL MG/DL
GRAN CASTS #/AREA UR COMP ASSIST: ABNORMAL /LPF
HOLD SPECIMEN: NORMAL
HYALINE CASTS #/AREA URNS AUTO: ABNORMAL /LPF
KETONES UR STRIP.AUTO-MCNC: ABNORMAL MG/DL
LEUKOCYTE ESTERASE UR QL STRIP.AUTO: NEGATIVE
MUCOUS THREADS #/AREA URNS AUTO: ABNORMAL /LPF
NITRITE UR QL STRIP.AUTO: NEGATIVE
PH UR STRIP.AUTO: 5.5 [PH]
PROT UR STRIP.AUTO-MCNC: ABNORMAL MG/DL
RBC # UR STRIP.AUTO: ABNORMAL /UL
RBC #/AREA URNS AUTO: ABNORMAL /HPF
SP GR UR STRIP.AUTO: 1.03
SQUAMOUS #/AREA URNS AUTO: ABNORMAL /HPF
UROBILINOGEN UR STRIP.AUTO-MCNC: NORMAL MG/DL
WBC #/AREA URNS AUTO: ABNORMAL /HPF

## 2024-12-22 PROCEDURE — 4500999001 HC ED NO CHARGE

## 2024-12-22 PROCEDURE — 99283 EMERGENCY DEPT VISIT LOW MDM: CPT

## 2024-12-22 PROCEDURE — 70450 CT HEAD/BRAIN W/O DYE: CPT

## 2024-12-22 PROCEDURE — 99285 EMERGENCY DEPT VISIT HI MDM: CPT | Mod: 25 | Performed by: STUDENT IN AN ORGANIZED HEALTH CARE EDUCATION/TRAINING PROGRAM

## 2024-12-22 PROCEDURE — 81001 URINALYSIS AUTO W/SCOPE: CPT | Performed by: PHYSICIAN ASSISTANT

## 2024-12-22 PROCEDURE — 70450 CT HEAD/BRAIN W/O DYE: CPT | Performed by: RADIOLOGY

## 2024-12-22 RX ORDER — OLANZAPINE 10 MG/2ML
10 INJECTION, POWDER, FOR SOLUTION INTRAMUSCULAR ONCE
Status: DISCONTINUED | OUTPATIENT
Start: 2024-12-22 | End: 2024-12-22

## 2024-12-22 SDOH — HEALTH STABILITY: MENTAL HEALTH: NON-SPECIFIC ACTIVE SUICIDAL THOUGHTS (PAST 1 MONTH): NO

## 2024-12-22 SDOH — HEALTH STABILITY: MENTAL HEALTH: SUICIDAL BEHAVIOR (LIFETIME): NO

## 2024-12-22 SDOH — HEALTH STABILITY: MENTAL HEALTH: ANXIETY SYMPTOMS: NO PROBLEMS REPORTED OR OBSERVED.

## 2024-12-22 SDOH — HEALTH STABILITY: MENTAL HEALTH: DEPRESSION SYMPTOMS: NO PROBLEMS REPORTED OR OBSERVED.

## 2024-12-22 SDOH — HEALTH STABILITY: MENTAL HEALTH: BEHAVIORAL HEALTH(WDL): EXCEPTIONS TO WDL

## 2024-12-22 SDOH — HEALTH STABILITY: MENTAL HEALTH: WISH TO BE DEAD (PAST 1 MONTH): NO

## 2024-12-22 SDOH — ECONOMIC STABILITY: HOUSING INSECURITY: FEELS SAFE LIVING IN HOME: YES

## 2024-12-22 ASSESSMENT — LIFESTYLE VARIABLES
HAVE YOU EVER FELT YOU SHOULD CUT DOWN ON YOUR DRINKING: NO
TOTAL SCORE: 0
SUBSTANCE_ABUSE_PAST_12_MONTHS: YES
PRESCIPTION_ABUSE_PAST_12_MONTHS: YES
HAVE PEOPLE ANNOYED YOU BY CRITICIZING YOUR DRINKING: NO
EVER HAD A DRINK FIRST THING IN THE MORNING TO STEADY YOUR NERVES TO GET RID OF A HANGOVER: NO
EVER FELT BAD OR GUILTY ABOUT YOUR DRINKING: NO

## 2024-12-22 ASSESSMENT — PAIN SCALES - GENERAL: PAINLEVEL_OUTOF10: 8

## 2024-12-22 ASSESSMENT — PAIN - FUNCTIONAL ASSESSMENT: PAIN_FUNCTIONAL_ASSESSMENT: 0-10
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
## 2025-01-08 ENCOUNTER — HOSPITAL ENCOUNTER (EMERGENCY)
Facility: HOSPITAL | Age: 45
Discharge: HOME | End: 2025-01-08
Attending: EMERGENCY MEDICINE
Payer: COMMERCIAL

## 2025-01-08 VITALS
BODY MASS INDEX: 45.47 KG/M2 | SYSTOLIC BLOOD PRESSURE: 148 MMHG | WEIGHT: 300 LBS | HEART RATE: 92 BPM | RESPIRATION RATE: 17 BRPM | TEMPERATURE: 97.5 F | OXYGEN SATURATION: 100 % | HEIGHT: 68 IN | DIASTOLIC BLOOD PRESSURE: 98 MMHG

## 2025-01-08 DIAGNOSIS — R44.0 AUDITORY HALLUCINATION: Primary | ICD-10-CM

## 2025-01-08 LAB
ALBUMIN SERPL BCP-MCNC: 3.7 G/DL (ref 3.4–5)
ALP SERPL-CCNC: 72 U/L (ref 33–120)
ALT SERPL W P-5'-P-CCNC: 18 U/L (ref 10–52)
ANION GAP SERPL CALC-SCNC: 11 MMOL/L (ref 10–20)
APAP SERPL-MCNC: <10 UG/ML
AST SERPL W P-5'-P-CCNC: 21 U/L (ref 9–39)
ATRIAL RATE: 100 BPM
BASOPHILS # BLD AUTO: 0.05 X10*3/UL (ref 0–0.1)
BASOPHILS NFR BLD AUTO: 0.9 %
BILIRUB SERPL-MCNC: 0.3 MG/DL (ref 0–1.2)
BUN SERPL-MCNC: 16 MG/DL (ref 6–23)
CALCIUM SERPL-MCNC: 9.1 MG/DL (ref 8.6–10.6)
CHLORIDE SERPL-SCNC: 107 MMOL/L (ref 98–107)
CO2 SERPL-SCNC: 26 MMOL/L (ref 21–32)
CREAT SERPL-MCNC: 0.83 MG/DL (ref 0.5–1.3)
EGFRCR SERPLBLD CKD-EPI 2021: >90 ML/MIN/1.73M*2
EOSINOPHIL # BLD AUTO: 0.11 X10*3/UL (ref 0–0.7)
EOSINOPHIL NFR BLD AUTO: 1.9 %
ERYTHROCYTE [DISTWIDTH] IN BLOOD BY AUTOMATED COUNT: 14 % (ref 11.5–14.5)
ETHANOL SERPL-MCNC: <10 MG/DL
GLUCOSE SERPL-MCNC: 83 MG/DL (ref 74–99)
HCT VFR BLD AUTO: 47.7 % (ref 41–52)
HGB BLD-MCNC: 15.5 G/DL (ref 13.5–17.5)
IMM GRANULOCYTES # BLD AUTO: 0.02 X10*3/UL (ref 0–0.7)
IMM GRANULOCYTES NFR BLD AUTO: 0.3 % (ref 0–0.9)
LYMPHOCYTES # BLD AUTO: 2.02 X10*3/UL (ref 1.2–4.8)
LYMPHOCYTES NFR BLD AUTO: 35.3 %
MCH RBC QN AUTO: 27.5 PG (ref 26–34)
MCHC RBC AUTO-ENTMCNC: 32.5 G/DL (ref 32–36)
MCV RBC AUTO: 85 FL (ref 80–100)
MONOCYTES # BLD AUTO: 0.6 X10*3/UL (ref 0.1–1)
MONOCYTES NFR BLD AUTO: 10.5 %
NEUTROPHILS # BLD AUTO: 2.93 X10*3/UL (ref 1.2–7.7)
NEUTROPHILS NFR BLD AUTO: 51.1 %
NRBC BLD-RTO: 0 /100 WBCS (ref 0–0)
P AXIS: 57 DEGREES
P OFFSET: 206 MS
P ONSET: 155 MS
PLATELET # BLD AUTO: 228 X10*3/UL (ref 150–450)
POTASSIUM SERPL-SCNC: 4.2 MMOL/L (ref 3.5–5.3)
PR INTERVAL: 136 MS
PROT SERPL-MCNC: 6.9 G/DL (ref 6.4–8.2)
Q ONSET: 223 MS
QRS COUNT: 16 BEATS
QRS DURATION: 84 MS
QT INTERVAL: 346 MS
QTC CALCULATION(BAZETT): 446 MS
QTC FREDERICIA: 410 MS
R AXIS: 81 DEGREES
RBC # BLD AUTO: 5.64 X10*6/UL (ref 4.5–5.9)
SALICYLATES SERPL-MCNC: <3 MG/DL
SODIUM SERPL-SCNC: 140 MMOL/L (ref 136–145)
T AXIS: -26 DEGREES
T OFFSET: 396 MS
T4 FREE SERPL-MCNC: 1.52 NG/DL (ref 0.78–1.48)
TSH SERPL-ACNC: 0.36 MIU/L (ref 0.44–3.98)
VENTRICULAR RATE: 100 BPM
WBC # BLD AUTO: 5.7 X10*3/UL (ref 4.4–11.3)

## 2025-01-08 PROCEDURE — 99284 EMERGENCY DEPT VISIT MOD MDM: CPT | Mod: 25 | Performed by: STUDENT IN AN ORGANIZED HEALTH CARE EDUCATION/TRAINING PROGRAM

## 2025-01-08 PROCEDURE — 84439 ASSAY OF FREE THYROXINE: CPT | Performed by: PHYSICIAN ASSISTANT

## 2025-01-08 PROCEDURE — 99285 EMERGENCY DEPT VISIT HI MDM: CPT | Performed by: EMERGENCY MEDICINE

## 2025-01-08 PROCEDURE — 80179 DRUG ASSAY SALICYLATE: CPT | Performed by: PHYSICIAN ASSISTANT

## 2025-01-08 PROCEDURE — 99285 EMERGENCY DEPT VISIT HI MDM: CPT | Performed by: PHYSICIAN ASSISTANT

## 2025-01-08 PROCEDURE — 85025 COMPLETE CBC W/AUTO DIFF WBC: CPT | Performed by: PHYSICIAN ASSISTANT

## 2025-01-08 PROCEDURE — 84075 ASSAY ALKALINE PHOSPHATASE: CPT | Performed by: PHYSICIAN ASSISTANT

## 2025-01-08 PROCEDURE — 36415 COLL VENOUS BLD VENIPUNCTURE: CPT | Performed by: PHYSICIAN ASSISTANT

## 2025-01-08 PROCEDURE — 84443 ASSAY THYROID STIM HORMONE: CPT | Performed by: PHYSICIAN ASSISTANT

## 2025-01-08 ASSESSMENT — PAIN SCALES - GENERAL: PAINLEVEL_OUTOF10: 8

## 2025-01-08 ASSESSMENT — PAIN - FUNCTIONAL ASSESSMENT: PAIN_FUNCTIONAL_ASSESSMENT: 0-10

## 2025-01-08 NOTE — PROGRESS NOTES
Emergency Medicine Transition of Care Note.    I received Anibal Caro in signout from previous provider. Please see the previous ED provider note for all HPI, PE and MDM up to the time of sign-out. This is in addition to the primary record.    ED Course as of 01/08/25 1752 Wed Jan 08, 2025   1506 At the end of my shift awaiting evaluation by EPAT.  Signout given to oncoming resident [GERARD]      ED Course User Index  [GERARD] Swapnil Dolan PA-C         Diagnoses as of 01/08/25 1752   Auditory hallucination     Medical Decision Making    Final diagnoses:   [R44.0] Auditory hallucination     At the time of sign out, vital signs were as follows:  Vitals:    01/08/25 1432   BP: (!) 148/98   Pulse: 92   Resp: 17   Temp: 36.4 °C (97.5 °F)   SpO2: 100%     In brief Anibal Caro is an 44 y.o. male with history of schizophrenia, prediabetes, HTN, chronic back pain, polysubstance abuse, presenting to the emergency department today for psychiatric evaluation.  Also here today by his group home due to concerns that he has been hearing more voices than usual.  On arrival to the emergency department patient has no acute complaints for us today and appears otherwise clinically well.    Patient was signed out to me with EPAT final recommendations pending.  On signout, blood pressure 148/98, rest of vital signs within normal limits.  Afebrile for us.  On examination, patient has no acute medical complaints.  Labs notable for white count of 5.7, hemoglobin stable at 15.5.  Lower concern for acute blood loss anemia today.  Chemistries relatively unremarkable.  Acute tox panel negative.  At this time patient is medically clear.  Evaluation.  After EPAT assessment, they believe he is at his clinical and psychiatric baseline.  They do not think he meets inpatient psychiatric anteriors today and are okay with discharge.  We agree with this plan.  Remains hemodynamically stable in the emergency department.  Will be discharged home in  stable condition.    Dispo: Discharge    Nitish Rodriguez MD  Emergency Medicine PGY3

## 2025-01-08 NOTE — ED TRIAGE NOTES
Patient presents to the Emergency department with a chief complaint of psychiatric evaluation. Patient was brought in from his group home by [place] [employer] after his group home staff called 911 due to the patient endorsing auditory hallucinations. Patient states a demon told him to urinate his pants. Patient states he is hearing voices that are loud and are causing him agitation. Patient denies any suicidal ideation, homicidal ideation, tactile hallucinations, or visual hallucinations. Patient states he smoked crack cocaine yesterday, but denies any other drug or alcohol use. Patient denies any other medical complaints at this time.

## 2025-01-08 NOTE — ED PROVIDER NOTES
"Emergency Department Provider Note        History of Present Illness     44-year-old male with history of schizophrenia, pre-DM, HTN, chronic back pain, polysubstance abuse presenting via PD for psych eval.  Reportedly group home staff called PD out of concerns.  The patient states that he has been hearing a lot of voices lately.  Would not specify and what they are saying.  Denies any command nature to the voices.  Denies any visual hallucinations.  Denies any suicidal or homicidal ideations.  States he has not had his meds \"in a while\" could not specify exactly how long.  States he did crack cocaine yesterday, none today.  Denies any alcohol use.  States he otherwise feels well in his normal state of health denying any chest pain, cough, shortness of breath, abdominal pain, nausea, vomiting, weakness, paresthesias, ataxia.  Denies any fevers chills night sweats or rigors.      Past Medical History:   Diagnosis Date    Acute (undifferentiated) schizophrenia (Multi)      Past Surgical History:   Procedure Laterality Date    CT ANGIO AORTA AND BILATERAL ILIOFEMORAL RUN OFF INCLUDING WITHOUT CONTRAST IF PERFORMED  11/3/2022    CT AORTA AND BILATERAL ILIOFEMORAL RUNOFF ANGIOGRAM W AND/OR WO IV CONTRAST 11/3/2022 [facility] EM[facility]GENCY LEGACY     Social History     Socioeconomic History    Marital status: Single   Tobacco Use    Smoking status: Never    Smokeless tobacco: Never   Vaping Use    Vaping status: Never Used   Substance and Sexual Activity    Alcohol use: Never     Social Drivers of Health     Financial Resource Strain: Medium Risk (10/25/2024)    Received from [facility]    Overall Financial Resource Strain (CARDIA)     Difficulty of Paying Living Expenses: Somewhat hard   Food Insecurity: Food Insecurity Present (11/21/2024)    Received from [facility]    Hunger Vital Sign     Worried About Running Out of Food in the Last Year: Never true     Ran Out of Food in the Last Year: Often true   Transportation " Needs: No Transportation Needs (10/25/2024)    Received from [facility]    PRAPARE - Transportation     Lack of Transportation (Medical): No     Lack of Transportation (Non-Medical): No   Physical Activity: Not on File (8/24/2019)    Received from [facility]    Physical Activity     Physical Activity: 0   Stress: Not on File (8/24/2019)    Received from [facility]    Stress     Stress: 0   Social Connections: Not on File (9/15/2024)    Received from [facility]    Social Connections     Connectedness: 0   Housing Stability: Low Risk  (10/25/2024)    Received from [facility]    Housing Stability Vital Sign     Unable to Pay for Housing in the Last Year: No     Number of Times Moved in the Last Year: 1     Homeless in the Last Year: No     Allergies   Allergen Reactions    Amoxicillin Rash and Unknown     GI upset    Bee Pollen Rash     Other reaction(s): Intolerance    Grass Pollen Hives     Sneezing    Pollen Extracts Itching and Unknown     Other reaction(s): Intolerance         External Records Reviewed including ED notes, H&P, Discharge Summary, outpatient PCP/specialist notes.  Physical Exam       Triage Vitals: T 36.8 °C (98.3 °F)  HR 94  BP (!) 148/99  RR 18  O2 98 % None (Room air)  GEN: NAD  Psych: Aggressive affect however able to be redirected, does not appear internally stimulated, able to answer questions appropriately with linear conversation.  EYES:  EOMs grossly intact, anicteric sclera  AMY: Mucosa moist.  NECK: Supple.  CARD: RRR  PULMONARY: Moving air well. Clear all lung fields.  ABDOMEN: Soft, no guarding, no rigidity. Nontender. NABS  EXTREMITIES: Full ROM, no pitting edema,   SKIN: Intact, warm and dry  NEURO: Alert and oriented x 3, speech is clear, no obvious deficits noted.         Medical Decision Making & ED Course     44-year-old male with history as above presenting via PD for auditory hallucinations.  On exam he is well-appearing sitting up comfortably.  VSS.  Aggressive  however redirectable.  PE is unremarkable.  Will check laboratory work, urine, have EPAT evaluate.    ED Course as of 01/08/25 1507   Wed Jan 08, 2025   1506 At the end of my shift awaiting evaluation by EPAT.  Signout given to oncoming resident [GERARD]      ED Course User Index  [GERARD] Swapnil Dolan PA-C         Diagnoses as of 01/08/25 1507   Auditory hallucination     No orders to display     Labs Reviewed   COMPREHENSIVE METABOLIC PANEL - Normal       Result Value    Glucose 83      Sodium 140      Potassium 4.2      Chloride 107      Bicarbonate 26      Anion Gap 11      Urea Nitrogen 16      Creatinine 0.83      eGFR >90      Calcium 9.1      Albumin 3.7      Alkaline Phosphatase 72      Total Protein 6.9      AST 21      Bilirubin, Total 0.3      ALT 18     ACUTE TOXICOLOGY PANEL, BLOOD - Normal    Acetaminophen <10.0      Salicylate  <3      Alcohol <10     CBC WITH AUTO DIFFERENTIAL    WBC 5.7      nRBC 0.0      RBC 5.64      Hemoglobin 15.5      Hematocrit 47.7      MCV 85      MCH 27.5      MCHC 32.5      RDW 14.0      Platelets 228      Neutrophils % 51.1      Immature Granulocytes %, Automated 0.3      Lymphocytes % 35.3      Monocytes % 10.5      Eosinophils % 1.9      Basophils % 0.9      Neutrophils Absolute 2.93      Immature Granulocytes Absolute, Automated 0.02      Lymphocytes Absolute 2.02      Monocytes Absolute 0.60      Eosinophils Absolute 0.11      Basophils Absolute 0.05     DRUG SCREEN,URINE   TSH WITH REFLEX TO FREE T4 IF ABNORMAL   URINALYSIS WITH REFLEX CULTURE AND MICROSCOPIC    Narrative:     The following orders were created for panel order Urinalysis with Reflex Culture and Microscopic.  Procedure                               Abnormality         Status                     ---------                               -----------         ------                     Urinalysis with Reflex C...[[id number]]                                                 Extra Urine Gray Tube[[id number]]                                                          Please view results for these tests on the individual orders.   URINALYSIS WITH REFLEX CULTURE AND MICROSCOPIC   EXTRA URINE GRAY TUBE       ----------------------------------------------------------------------------------------------------------------------------    This note was dictated using a speech recognition program.  While an attempt was made at proof reading to minimize errors, minor errors in transcription may be present call for questions.     Swapnil Dolan PA-C  01/08/25 1509

## 2025-01-08 NOTE — PROGRESS NOTES
Anibal Caro is a 44 y.o. male on day 0 of admission presenting with No Principal Problem: There is no principal problem currently on the Problem List. Please update the Problem List and refresh..    Social Work Note. At the request of patient and permission for doctor I spoke with patient at bedside.   He reports living in a group home for the last 15 years [phone].  With his permission I called and spoke to Jonathan Charles, .  He is aware that patient will return to facility once cleared.  Pre-transfer call to be completed.    Updated clinical team.    Tariq Fung LCSW
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
## 2025-02-04 ENCOUNTER — HOSPITAL ENCOUNTER (EMERGENCY)
Facility: HOSPITAL | Age: 45
Discharge: HOME | End: 2025-02-04
Payer: COMMERCIAL

## 2025-02-04 VITALS
RESPIRATION RATE: 18 BRPM | TEMPERATURE: 98.1 F | HEART RATE: 93 BPM | BODY MASS INDEX: 42.95 KG/M2 | WEIGHT: 300 LBS | OXYGEN SATURATION: 98 % | HEIGHT: 70 IN | DIASTOLIC BLOOD PRESSURE: 87 MMHG | SYSTOLIC BLOOD PRESSURE: 123 MMHG

## 2025-02-04 DIAGNOSIS — M25.562 CHRONIC PAIN OF BOTH KNEES: ICD-10-CM

## 2025-02-04 DIAGNOSIS — G89.29 CHRONIC PAIN OF BOTH KNEES: ICD-10-CM

## 2025-02-04 DIAGNOSIS — M25.561 CHRONIC PAIN OF BOTH KNEES: ICD-10-CM

## 2025-02-04 DIAGNOSIS — M25.532 LEFT WRIST PAIN: Primary | ICD-10-CM

## 2025-02-04 PROCEDURE — 99283 EMERGENCY DEPT VISIT LOW MDM: CPT

## 2025-02-04 PROCEDURE — 2500000001 HC RX 250 WO HCPCS SELF ADMINISTERED DRUGS (ALT 637 FOR MEDICARE OP): Performed by: PHYSICIAN ASSISTANT

## 2025-02-04 PROCEDURE — 99283 EMERGENCY DEPT VISIT LOW MDM: CPT | Performed by: PHYSICIAN ASSISTANT

## 2025-02-04 RX ORDER — IBUPROFEN 600 MG/1
600 TABLET ORAL EVERY 6 HOURS PRN
Qty: 28 TABLET | Refills: 0 | Status: SHIPPED | OUTPATIENT
Start: 2025-02-04 | End: 2025-02-11

## 2025-02-04 RX ORDER — IBUPROFEN 600 MG/1
600 TABLET ORAL ONCE
Status: COMPLETED | OUTPATIENT
Start: 2025-02-04 | End: 2025-02-04

## 2025-02-04 RX ADMIN — IBUPROFEN 600 MG: 600 TABLET, FILM COATED ORAL at 07:40

## 2025-02-04 ASSESSMENT — PAIN SCALES - GENERAL: PAINLEVEL_OUTOF10: 10 - WORST POSSIBLE PAIN

## 2025-02-04 ASSESSMENT — PAIN DESCRIPTION - PAIN TYPE: TYPE: ACUTE PAIN

## 2025-02-04 ASSESSMENT — LIFESTYLE VARIABLES
TOTAL SCORE: 0
HAVE PEOPLE ANNOYED YOU BY CRITICIZING YOUR DRINKING: NO
HAVE YOU EVER FELT YOU SHOULD CUT DOWN ON YOUR DRINKING: NO
EVER HAD A DRINK FIRST THING IN THE MORNING TO STEADY YOUR NERVES TO GET RID OF A HANGOVER: NO
EVER FELT BAD OR GUILTY ABOUT YOUR DRINKING: NO

## 2025-02-04 ASSESSMENT — PAIN DESCRIPTION - LOCATION: LOCATION: KNEE

## 2025-02-04 ASSESSMENT — PAIN - FUNCTIONAL ASSESSMENT: PAIN_FUNCTIONAL_ASSESSMENT: 0-10

## 2025-02-04 NOTE — DISCHARGE INSTRUCTIONS
You were seen in the emergency department for evaluation of left wrist and knee pain.    You were prescribed ibuprofen.  You were given a referral to see orthopedic hand specialist.    Please follow-up with your primary care provider.  If you do not have a primary care provider you can call [phone] to make an appointment.    Please do not hesitate to return to the ED if symptoms change or worsen.

## 2025-02-04 NOTE — ED TRIAGE NOTES
Pt presented to ED and then went outside for 45 minutes to smoke his cigarettes, pt is now complaining of 10/10 pain in his left hand, left wrist and left knee. He describes the pain as cramping, pt denies any recent trauma or injury

## 2025-02-04 NOTE — ED PROVIDER NOTES
HPI   Chief Complaint   Patient presents with    Knee Pain       HPI  Patient is a 44-year-old male with history of obesity, schizophrenia, pre-DM, HTN, chronic back pain, polysubstance abuse presenting to the ED for evaluation of left wrist and bilateral knee pain.  Patient reports years ago he cut his left wrist and never followed up for corrective surgery.  Endorses pain and numbness over the last couple of years.  Denies new injury.  Patient also reports he has had bilateral knee pain since high school.  No new injuries today.  Pain is worse when he walks around.  He arrived via EMS today from his group home.  No new symptoms today.  Denies alcohol use.  He is a smoker.      Patient History   Past Medical History:   Diagnosis Date    Acute (undifferentiated) schizophrenia (Multi)      Past Surgical History:   Procedure Laterality Date    CT ANGIO AORTA AND BILATERAL ILIOFEMORAL RUN OFF INCLUDING WITHOUT CONTRAST IF PERFORMED  11/3/2022    CT AORTA AND BILATERAL ILIOFEMORAL RUNOFF ANGIOGRAM W AND/OR WO IV CONTRAST 11[place]3/20[place] [facility]     No family history on file.  Social History     Tobacco Use    Smoking status: Never    Smokeless tobacco: Never   Vaping Use    Vaping status: Never Used   Substance Use Topics    Alcohol use: Never    Drug use: Not on file       Physical Exam   ED Triage Vitals [02/04/25 0437]   Temperature Heart Rate Respirations BP   36.7 °C (98.1 °F) 93 18 123/87      Pulse Ox Temp Source Heart Rate Source Patient Position   98 % Temporal Monitor Sitting      BP Location FiO2 (%)     Left arm --       Physical Exam  Vitals reviewed.   Constitutional:       General: He is not in acute distress.     Appearance: Normal appearance. He is not ill-appearing, toxic-appearing or diaphoretic.   HENT:      Head: Normocephalic and atraumatic.      Nose: No congestion or rhinorrhea.   Eyes:      General: No scleral icterus.        Right eye: No discharge.         Left eye: No discharge.       Conjunctiva/sclera: Conjunctivae normal.   Cardiovascular:      Rate and Rhythm: Normal rate and regular rhythm.      Pulses: Normal pulses.   Pulmonary:      Effort: Pulmonary effort is normal.   Musculoskeletal:      Right elbow: Normal.      Left elbow: Normal.      Right forearm: Normal.      Left forearm: Normal.      Right wrist: Normal.      Left wrist: Tenderness present. No swelling, deformity, effusion, bony tenderness or crepitus. Normal pulse.      Cervical back: Neck supple.      Right lower leg: No edema.      Left lower leg: No edema.      Comments: Endorses numbness to left fingertips.   Skin:     General: Skin is warm and dry.      Capillary Refill: Capillary refill takes less than 2 seconds.      Coloration: Skin is not jaundiced or pale.   Neurological:      General: No focal deficit present.      Mental Status: He is alert.   Psychiatric:         Mood and Affect: Mood normal.         Behavior: Behavior normal.           ED Course & MDM   Diagnoses as of 02/04/25 0743   Left wrist pain   Chronic pain of both knees                 No data recorded     Julius Coma Scale Score: 15 (02/04/25 0437 : Baltazar Patterson RN)                           Medical Decision Making  Patient is a 44-year-old male with history of obesity, schizophrenia, pre-DM, HTN, chronic back pain, polysubstance abuse presenting to the ED for evaluation of left wrist and bilateral knee pain.  On exam patient is well-appearing in no acute distress.  Vital signs are stable.  Cardiopulmonary exam is largely unremarkable.  He has no bony tenderness to left wrist.  Does endorse decreased sensation to fingertips.  2+ radial pulses bilaterally.  5 out of 5 strength bilaterally.  He is able to ambulate and bear weight on his knees.  No reproducible tenderness upon palpation.  Differential includes but is not limited to chronic knee pain, wrist/knee sprain/strain, carpal tunnel.    Patient is hungry requesting food.  He was provided with  food and pain controlled with ibuprofen 600 mg p.o. on reevaluation patient is doing well and requesting provide a ride.    Social determinants of health affecting care: Lives in group home    Patient was informed of the aforementioned findings.  Symptoms are felt to be due to chronic wrist and knee pain.  Patient will be prescribed ibuprofen.  He was given a referral to orthopedics.    At this time, low suspicion for an acute process that would necessitate further emergent workup, urgent specialist consultation, or hospitalization.  Based on clinical workup, the disposition of discharge is appropriate.  Advised patient to pursue close follow-up with PCP.  Patient was provided with appropriate information to assist in obtaining the proper follow-up.  Discussed strict return to ED protocols with patient, including low threshold to return for changing/worsening symptoms.  Patient demonstrated understanding and agreement with the plan of care, and all questions answered prior to discharge.  Patient stable at time of discharge.     --------------------------------------------------------------------------------------------------------------------------------------------------------------------------------------------------------------------------    This note was dictated using a speech recognition program.  While an attempt was made at proof reading to minimize errors, minor errors in transcription may be present call for questions.     Procedure  Procedures     Isai Lezama PA-C  02/04/25 0819

## 2025-03-02 ENCOUNTER — HOSPITAL ENCOUNTER (EMERGENCY)
Facility: HOSPITAL | Age: 45
Discharge: HOME | End: 2025-03-02
Attending: EMERGENCY MEDICINE
Payer: COMMERCIAL

## 2025-03-02 VITALS
TEMPERATURE: 98.2 F | SYSTOLIC BLOOD PRESSURE: 137 MMHG | WEIGHT: 300 LBS | HEART RATE: 106 BPM | BODY MASS INDEX: 45.47 KG/M2 | RESPIRATION RATE: 18 BRPM | OXYGEN SATURATION: 93 % | HEIGHT: 68 IN | DIASTOLIC BLOOD PRESSURE: 88 MMHG

## 2025-03-02 DIAGNOSIS — F20.9 SCHIZOPHRENIA, UNSPECIFIED TYPE: Primary | ICD-10-CM

## 2025-03-02 PROCEDURE — 99283 EMERGENCY DEPT VISIT LOW MDM: CPT | Performed by: EMERGENCY MEDICINE

## 2025-03-02 SDOH — HEALTH STABILITY: MENTAL HEALTH: BEHAVIORS/MOOD: WANDERING

## 2025-03-02 SDOH — HEALTH STABILITY: MENTAL HEALTH: BEHAVIORAL HEALTH(WDL): EXCEPTIONS TO WDL

## 2025-03-02 SDOH — HEALTH STABILITY: MENTAL HEALTH: HALLUCINATION: AUDITORY

## 2025-03-02 SDOH — HEALTH STABILITY: MENTAL HEALTH: CONTENT: HYPOCHONDRIA

## 2025-03-02 ASSESSMENT — PAIN DESCRIPTION - LOCATION: LOCATION: WRIST

## 2025-03-02 ASSESSMENT — PAIN - FUNCTIONAL ASSESSMENT: PAIN_FUNCTIONAL_ASSESSMENT: 0-10

## 2025-03-02 ASSESSMENT — PAIN SCALES - GENERAL: PAINLEVEL_OUTOF10: 4

## 2025-03-03 NOTE — ED TRIAGE NOTES
Pt BIB EMS. Pt was found in the middle of the street yelling about slit wrist. Pt wrist are intact at the time of triage with no bleeding or scars. Pt has a hx of schizophrenia, unsure if pt is compliant with medication. Pt A&Ox2 at this time. Unknown baseline. Pt ambulatory with no assistance.

## 2025-03-03 NOTE — ED PROVIDER NOTES
Emergency Department Provider Note        History of Present Illness     History provided by: Patient  Limitations to History: Mental Illness  External Records Reviewed with Brief Summary:  Multiple emergency department notes from  as well as outside hospital noting that patient comes in for similar concerns.  And that he usually ends up leaving shortly afterwards.  Has not required recent hospitalization in almost a year    HPI:  Anibal Caro is a 44 y.o. male presenting to the emergency department for concerns of left wrist pain.  He notes he has chronic wrist pain after he slashed his wrist in .  He says he has chronic nerve damage and is wanting surgical repair.  He also is requesting ginger ale at this time.  He says he is unable to use his fingers despite using them in front of me.    Physical Exam   Triage vitals:  T 36.8 °C (98.2 °F)  HR (!) 106  /88  RR 18  O2 (!) 93 % None (Room air)    Physical Exam  Vitals reviewed.   Constitutional:       Appearance: Normal appearance.   Eyes:      General:         Right eye: No discharge.         Left eye: No discharge.   Cardiovascular:      Rate and Rhythm: Normal rate.   Pulmonary:      Effort: Pulmonary effort is normal. No respiratory distress.   Musculoskeletal:         General: No deformity or signs of injury. Normal range of motion.   Skin:     General: Skin is warm and dry.      Capillary Refill: Capillary refill takes less than 2 seconds.   Neurological:      Mental Status: He is alert and oriented to person, place, and time.   Psychiatric:      Comments: Patient's thought process is difficult to follow as he notes he cannot use his left hand while actively using it and says he cannot bend his fingers while he bends them.  Denies suicidal thoughts or homicidal ideation.  He is not having any hallucinations          Medical Decision Making & ED Course   Medical Decision Makin y.o. male Patient presenting as per HPI.  Upon initial  evaluation patient was emotionally: Stable.   As a result after evaluating the patient it was determined in the best interest for patient safety, that we proceed with monitoring.  Ddx includes: anxiety and Schizophrenia that is unmedicated After reassessing the patient, patient was hemodynamically stable.  And requesting to leave as well as a bus pass.  Patient currently does not pose any threat to self or others, and is deemed appropriate to discharge the patient home.      Differential diagnoses considered include but are not limited to: As per [facility]    Chronic conditions affecting the patient's care: As documented above in [facility]    Care Considerations: As documented above in [facility]    ED Course:  Diagnoses as of 03/02/25 2012   Schizophrenia, unspecified type     Disposition   As a result of the work-up, the patient was discharged home.  he was informed of his diagnosis and instructed to come back with any concerns or worsening of condition.  he and was agreeable to the plan as discussed above.  he was given the opportunity to ask questions.  All of the patient's questions were answered.    Procedures   Procedures      Aayush Newton DO  Emergency Medicine      Aayush Newton DO  Resident  03/02/25 0620

## 2025-03-04 ENCOUNTER — HOSPITAL ENCOUNTER (EMERGENCY)
Facility: HOSPITAL | Age: 45
Discharge: LEFT BEFORE DISCHARGE | End: 2025-03-04
Payer: COMMERCIAL

## 2025-03-04 ENCOUNTER — APPOINTMENT (OUTPATIENT)
Dept: RADIOLOGY | Facility: HOSPITAL | Age: 45
End: 2025-03-04
Payer: COMMERCIAL

## 2025-03-04 ENCOUNTER — CLINICAL SUPPORT (OUTPATIENT)
Dept: EMERGENCY MEDICINE | Facility: HOSPITAL | Age: 45
End: 2025-03-04
Payer: COMMERCIAL

## 2025-03-04 ENCOUNTER — HOSPITAL ENCOUNTER (EMERGENCY)
Facility: HOSPITAL | Age: 45
Discharge: LEFT BEFORE DISCHARGE | End: 2025-03-05
Attending: EMERGENCY MEDICINE
Payer: COMMERCIAL

## 2025-03-04 DIAGNOSIS — J18.9 PNEUMONIA DUE TO INFECTIOUS ORGANISM, UNSPECIFIED LATERALITY, UNSPECIFIED PART OF LUNG: Primary | ICD-10-CM

## 2025-03-04 DIAGNOSIS — F10.10 ALCOHOL ABUSE: ICD-10-CM

## 2025-03-04 LAB
BASOPHILS # BLD AUTO: 0.07 X10*3/UL (ref 0–0.1)
BASOPHILS NFR BLD AUTO: 1.2 %
EOSINOPHIL # BLD AUTO: 0.2 X10*3/UL (ref 0–0.7)
EOSINOPHIL NFR BLD AUTO: 3.4 %
ERYTHROCYTE [DISTWIDTH] IN BLOOD BY AUTOMATED COUNT: 13.5 % (ref 11.5–14.5)
HCT VFR BLD AUTO: 43.5 % (ref 41–52)
HGB BLD-MCNC: 14.7 G/DL (ref 13.5–17.5)
IMM GRANULOCYTES # BLD AUTO: 0.01 X10*3/UL (ref 0–0.7)
IMM GRANULOCYTES NFR BLD AUTO: 0.2 % (ref 0–0.9)
LYMPHOCYTES # BLD AUTO: 2.62 X10*3/UL (ref 1.2–4.8)
LYMPHOCYTES NFR BLD AUTO: 44.2 %
MCH RBC QN AUTO: 27.5 PG (ref 26–34)
MCHC RBC AUTO-ENTMCNC: 33.8 G/DL (ref 32–36)
MCV RBC AUTO: 82 FL (ref 80–100)
MONOCYTES # BLD AUTO: 0.57 X10*3/UL (ref 0.1–1)
MONOCYTES NFR BLD AUTO: 9.6 %
NEUTROPHILS # BLD AUTO: 2.46 X10*3/UL (ref 1.2–7.7)
NEUTROPHILS NFR BLD AUTO: 41.4 %
NRBC BLD-RTO: 0 /100 WBCS (ref 0–0)
PLATELET # BLD AUTO: 297 X10*3/UL (ref 150–450)
RBC # BLD AUTO: 5.34 X10*6/UL (ref 4.5–5.9)
WBC # BLD AUTO: 5.9 X10*3/UL (ref 4.4–11.3)

## 2025-03-04 PROCEDURE — 93005 ELECTROCARDIOGRAM TRACING: CPT

## 2025-03-04 PROCEDURE — 85025 COMPLETE CBC W/AUTO DIFF WBC: CPT

## 2025-03-04 PROCEDURE — 36415 COLL VENOUS BLD VENIPUNCTURE: CPT

## 2025-03-04 PROCEDURE — 99283 EMERGENCY DEPT VISIT LOW MDM: CPT

## 2025-03-04 PROCEDURE — 99285 EMERGENCY DEPT VISIT HI MDM: CPT | Mod: 25 | Performed by: EMERGENCY MEDICINE

## 2025-03-04 PROCEDURE — 71046 X-RAY EXAM CHEST 2 VIEWS: CPT

## 2025-03-04 PROCEDURE — 83735 ASSAY OF MAGNESIUM: CPT

## 2025-03-04 PROCEDURE — 80053 COMPREHEN METABOLIC PANEL: CPT

## 2025-03-04 PROCEDURE — 71046 X-RAY EXAM CHEST 2 VIEWS: CPT | Performed by: RADIOLOGY

## 2025-03-04 PROCEDURE — 84132 ASSAY OF SERUM POTASSIUM: CPT

## 2025-03-04 RX ORDER — CYCLOBENZAPRINE HCL 10 MG
5 TABLET ORAL ONCE
Status: COMPLETED | OUTPATIENT
Start: 2025-03-04 | End: 2025-03-05

## 2025-03-04 RX ORDER — NAPROXEN 500 MG/1
500 TABLET ORAL ONCE
Status: COMPLETED | OUTPATIENT
Start: 2025-03-04 | End: 2025-03-05

## 2025-03-04 ASSESSMENT — PAIN - FUNCTIONAL ASSESSMENT: PAIN_FUNCTIONAL_ASSESSMENT: 0-10

## 2025-03-05 ENCOUNTER — PHARMACY VISIT (OUTPATIENT)
Dept: PHARMACY | Facility: CLINIC | Age: 45
End: 2025-03-05
Payer: COMMERCIAL

## 2025-03-05 VITALS
HEART RATE: 100 BPM | BODY MASS INDEX: 47.74 KG/M2 | OXYGEN SATURATION: 97 % | HEIGHT: 68 IN | WEIGHT: 315 LBS | RESPIRATION RATE: 18 BRPM | SYSTOLIC BLOOD PRESSURE: 132 MMHG | DIASTOLIC BLOOD PRESSURE: 76 MMHG | TEMPERATURE: 98.3 F

## 2025-03-05 LAB
ALBUMIN SERPL BCP-MCNC: 3.5 G/DL (ref 3.4–5)
ALP SERPL-CCNC: 79 U/L (ref 33–120)
ALT SERPL W P-5'-P-CCNC: 16 U/L (ref 10–52)
ANION GAP BLDV CALCULATED.4IONS-SCNC: 8 MMOL/L (ref 10–25)
ANION GAP SERPL CALC-SCNC: 14 MMOL/L (ref 10–20)
AST SERPL W P-5'-P-CCNC: 15 U/L (ref 9–39)
ATRIAL RATE: 99 BPM
BASE EXCESS BLDV CALC-SCNC: 1.4 MMOL/L (ref -2–3)
BILIRUB SERPL-MCNC: 0.2 MG/DL (ref 0–1.2)
BODY TEMPERATURE: 37 DEGREES CELSIUS
BUN SERPL-MCNC: 22 MG/DL (ref 6–23)
CA-I BLDV-SCNC: 1.23 MMOL/L (ref 1.1–1.33)
CALCIUM SERPL-MCNC: 9 MG/DL (ref 8.6–10.6)
CHLORIDE BLDV-SCNC: 105 MMOL/L (ref 98–107)
CHLORIDE SERPL-SCNC: 106 MMOL/L (ref 98–107)
CO2 SERPL-SCNC: 23 MMOL/L (ref 21–32)
CREAT SERPL-MCNC: 1 MG/DL (ref 0.5–1.3)
EGFRCR SERPLBLD CKD-EPI 2021: >90 ML/MIN/1.73M*2
GLUCOSE BLDV-MCNC: 120 MG/DL (ref 74–99)
GLUCOSE SERPL-MCNC: 120 MG/DL (ref 74–99)
HCO3 BLDV-SCNC: 27.2 MMOL/L (ref 22–26)
HCT VFR BLD EST: 45 % (ref 41–52)
HGB BLDV-MCNC: 14.9 G/DL (ref 13.5–17.5)
INHALED O2 CONCENTRATION: 21 %
LACTATE BLDV-SCNC: 1.7 MMOL/L (ref 0.4–2)
MAGNESIUM SERPL-MCNC: 2.03 MG/DL (ref 1.6–2.4)
OXYHGB MFR BLDV: 78.4 % (ref 45–75)
P AXIS: 41 DEGREES
P OFFSET: 211 MS
P ONSET: 154 MS
PCO2 BLDV: 46 MM HG (ref 41–51)
PH BLDV: 7.38 PH (ref 7.33–7.43)
PO2 BLDV: 50 MM HG (ref 35–45)
POTASSIUM BLDV-SCNC: 4.1 MMOL/L (ref 3.5–5.3)
POTASSIUM SERPL-SCNC: 4.3 MMOL/L (ref 3.5–5.3)
PR INTERVAL: 134 MS
PROT SERPL-MCNC: 6.1 G/DL (ref 6.4–8.2)
Q ONSET: 221 MS
QRS COUNT: 16 BEATS
QRS DURATION: 90 MS
QT INTERVAL: 344 MS
QTC CALCULATION(BAZETT): 441 MS
QTC FREDERICIA: 406 MS
R AXIS: 40 DEGREES
SAO2 % BLDV: 82 % (ref 45–75)
SODIUM BLDV-SCNC: 136 MMOL/L (ref 136–145)
SODIUM SERPL-SCNC: 139 MMOL/L (ref 136–145)
T AXIS: -5 DEGREES
T OFFSET: 393 MS
VENTRICULAR RATE: 99 BPM

## 2025-03-05 PROCEDURE — 2500000001 HC RX 250 WO HCPCS SELF ADMINISTERED DRUGS (ALT 637 FOR MEDICARE OP)

## 2025-03-05 RX ORDER — DOXYCYCLINE HYCLATE 100 MG
100 TABLET ORAL ONCE
Status: COMPLETED | OUTPATIENT
Start: 2025-03-05 | End: 2025-03-05

## 2025-03-05 RX ORDER — CEFUROXIME AXETIL 500 MG/1
500 TABLET ORAL 2 TIMES DAILY
Qty: 20 TABLET | Refills: 0 | Status: SHIPPED | OUTPATIENT
Start: 2025-03-05 | End: 2025-03-15

## 2025-03-05 RX ORDER — DOXYCYCLINE HYCLATE 100 MG
100 TABLET ORAL 2 TIMES DAILY
Qty: 14 TABLET | Refills: 0 | Status: SHIPPED | OUTPATIENT
Start: 2025-03-05 | End: 2025-03-12

## 2025-03-05 RX ORDER — CEFUROXIME AXETIL 250 MG/1
500 TABLET ORAL ONCE
Status: COMPLETED | OUTPATIENT
Start: 2025-03-05 | End: 2025-03-05

## 2025-03-05 RX ADMIN — CEFUROXIME AXETIL 500 MG: 250 TABLET, FILM COATED ORAL at 02:30

## 2025-03-05 RX ADMIN — NAPROXEN 500 MG: 500 TABLET ORAL at 00:18

## 2025-03-05 RX ADMIN — DOXYCYCLINE HYCLATE 100 MG: 100 TABLET, COATED ORAL at 02:30

## 2025-03-05 RX ADMIN — CYCLOBENZAPRINE HYDROCHLORIDE 5 MG: 10 TABLET, FILM COATED ORAL at 00:18

## 2025-03-05 SDOH — HEALTH STABILITY: MENTAL HEALTH: BEHAVIORS/MOOD: COOPERATIVE

## 2025-03-05 SDOH — HEALTH STABILITY: MENTAL HEALTH: BEHAVIORAL HEALTH(WDL): EXCEPTIONS TO WDL

## 2025-03-05 ASSESSMENT — PAIN SCALES - GENERAL: PAINLEVEL_OUTOF10: 6

## 2025-03-05 ASSESSMENT — PAIN - FUNCTIONAL ASSESSMENT: PAIN_FUNCTIONAL_ASSESSMENT: 0-10

## 2025-03-05 ASSESSMENT — PAIN DESCRIPTION - LOCATION: LOCATION: ARM

## 2025-03-05 NOTE — PROGRESS NOTES
Patient was handed off to me from the previous team. For full history, physical, and prior ED course, please see previous provider note prior to patient handoff. This is an addendum to the record.    This is a 44 year old male who was a sign out to me pending [facility] placement and his antibiotics to be delivered.   He had breakfast and walked out without his medications or [facility] placement.     Hospital Course/MDM:  Please see the original ED provider note     Disposition:  Would have been discharged     Adore Medina CNP  Emergency Medicine      Xels[employer] Pregnancy And Lactation Text: This medication is Pregnancy Category D and is not considered safe during pregnancy.  The risk during breast feeding is also uncertain.

## 2025-03-05 NOTE — ED TRIAGE NOTES
PT to the ED for [facility] and rehab eval. PT states that he used crack and marijuana but doesn't not remember the last time he used.

## 2025-03-05 NOTE — ED PROVIDER NOTES
Emergency Department Provider Note        History of Present Illness     History provided by: Patient  Limitations to History: Mental Illness    HPI:  Patient is a 44-year-old male with a past medical history of hypertension, polysubstance abuse, paranoid schizophrenia, frequent ED visits, auditory hallucinations who is presenting to the ED for detox.  Patient states that he would like to be placed for detox from crack and weed.  Patient states he last used yesterday.  Patient states he drinks alcohol however has not had it for the past over a week.  Patient has not SI or HI states he is having auditory hallucinations but states that it is very quiet.  Patient states that they are just changing things about him.  No visual hallucinations.  Patient also complaining of inner thigh pain states is a cramping sensation.  Patient denies any rashes or fluid draining from the site.  Physical Exam   Triage vitals:  T 36.1 °C (96.9 °F)  HR 82  /77  RR 16  O2 96 % None (Room air)    Physical Exam  Constitutional:       Appearance: Normal appearance.   HENT:      Head: Normocephalic.   Eyes:      Extraocular Movements: Extraocular movements intact.   Cardiovascular:      Rate and Rhythm: Tachycardia present.   Pulmonary:      Effort: Pulmonary effort is normal.   Abdominal:      General: Abdomen is flat.   Musculoskeletal:         General: Normal range of motion.      Cervical back: Normal range of motion.   Skin:     General: Skin is warm.   Neurological:      Mental Status: He is alert. Mental status is at baseline.          Medical Decision Making & ED Course   Medical Decision Making:  Patient is a 44-year-old male presenting to the ED for placement.  Patient states that he wants [facility] placement for crack and weed.  Patient states that he last used yesterday.  Patient also states daily alcohol use however has not had alcohol in over a week therefore low concern for withdrawal at this time.  Patient complaining of  auditory hallucinations however this seems to be his baseline no visual hallucinations and no suicidal or homicidal ideation.  Patient is tachycardic I will obtain an EKG.  Patient also complaining of inner thigh pain.  Denies any rashes or leakage from the site patient states that he does not want to talk about it and states that it is a cramping sensation.  I asked the patient if I can see the area due to concern for possible infection, torsion however patient states that he he does not want a physical exam.  At this time patient does have capacity to make decisions for himself and therefore physical exam was deferred.  Please see ED course further details         EKG Independent Interpretation: EKG interpreted by myself. Please see ED Course for full interpretation.        The patient was discussed with the following consultants/services: [facility]      ED Course as of 03/06/25 0542   e Mar 04, 2025   2256 EKG normal sinus rhythm, heart rate 99, QTc 441, no ST elevations or T wave inversions [TS]   2257 Will obtain a chest x-ray for possible pneumonia as patient had a cough and was tachycardic.  Will obtain labs for electrolyte abnormalities in the setting of substance use disorder and muscle cramping.  Patient was given Flexeril and naproxen for leg pain.  Will obtain a VBG for lactate as we are unable to do a physical exam and would like to rule out infectious sources. [TS]   Wed Mar 05, 2025   0109 Chest x-ray was concerning for possible pneumonia.  Patient has no leukocytosis, electrolytes within normal limits.  Patient repeat heart rate improved from prior.  I spoke to [facility] regarding placement who states they will work on placement [TS]   0154 Given cefuroxime and doxycycline for pneumonia treatment [TS]   u Mar 06, 2025   0542 Patient was signed out to [facility] provider for placement [TS]      ED Course User Index  [TS] Annalisa Bustamante MD         Diagnoses as of 03/06/25 0542   Pneumonia due to infectious  organism, unspecified laterality, unspecified part of lung   Alcohol abuse          Disposition   Patient was signed out to oncoming provider pending completion of their work-up.  Please see the next provider's transition of care note for the remainder of the patient's care.     Procedures   Procedures    Patient seen and discussed with ED attending physician.    Annalisa Bustamante MD  Emergency Medicine     Annalisa Bustamante MD  Resident  03/06/25 9672

## 2025-03-21 ENCOUNTER — APPOINTMENT (OUTPATIENT)
Dept: RADIOLOGY | Facility: HOSPITAL | Age: 45
End: 2025-03-21
Payer: COMMERCIAL

## 2025-03-21 ENCOUNTER — HOSPITAL ENCOUNTER (EMERGENCY)
Facility: HOSPITAL | Age: 45
Discharge: HOME | End: 2025-03-22
Attending: STUDENT IN AN ORGANIZED HEALTH CARE EDUCATION/TRAINING PROGRAM
Payer: COMMERCIAL

## 2025-03-21 VITALS
BODY MASS INDEX: 47.74 KG/M2 | RESPIRATION RATE: 16 BRPM | OXYGEN SATURATION: 98 % | WEIGHT: 315 LBS | HEIGHT: 68 IN | HEART RATE: 85 BPM | TEMPERATURE: 98.4 F | SYSTOLIC BLOOD PRESSURE: 138 MMHG | DIASTOLIC BLOOD PRESSURE: 94 MMHG

## 2025-03-21 DIAGNOSIS — M25.572 LEFT ANKLE PAIN, UNSPECIFIED CHRONICITY: Primary | ICD-10-CM

## 2025-03-21 PROCEDURE — 73610 X-RAY EXAM OF ANKLE: CPT | Mod: LT

## 2025-03-21 PROCEDURE — 99283 EMERGENCY DEPT VISIT LOW MDM: CPT

## 2025-03-21 PROCEDURE — 73610 X-RAY EXAM OF ANKLE: CPT | Mod: LEFT SIDE | Performed by: RADIOLOGY

## 2025-03-21 PROCEDURE — 2500000001 HC RX 250 WO HCPCS SELF ADMINISTERED DRUGS (ALT 637 FOR MEDICARE OP)

## 2025-03-21 PROCEDURE — 99283 EMERGENCY DEPT VISIT LOW MDM: CPT | Performed by: STUDENT IN AN ORGANIZED HEALTH CARE EDUCATION/TRAINING PROGRAM

## 2025-03-21 RX ORDER — ACETAMINOPHEN 325 MG/1
975 TABLET ORAL ONCE
Status: COMPLETED | OUTPATIENT
Start: 2025-03-21 | End: 2025-03-21

## 2025-03-21 RX ADMIN — ACETAMINOPHEN 975 MG: 325 TABLET ORAL at 22:28

## 2025-03-21 ASSESSMENT — PAIN DESCRIPTION - PAIN TYPE: TYPE: ACUTE PAIN

## 2025-03-21 ASSESSMENT — PAIN SCALES - GENERAL: PAINLEVEL_OUTOF10: 4

## 2025-03-21 ASSESSMENT — PAIN DESCRIPTION - LOCATION: LOCATION: ANKLE

## 2025-03-21 ASSESSMENT — PAIN DESCRIPTION - ORIENTATION: ORIENTATION: LEFT

## 2025-03-21 ASSESSMENT — PAIN DESCRIPTION - DESCRIPTORS: DESCRIPTORS: ACHING

## 2025-03-21 ASSESSMENT — PAIN - FUNCTIONAL ASSESSMENT: PAIN_FUNCTIONAL_ASSESSMENT: 0-10

## 2025-03-22 NOTE — DISCHARGE INSTRUCTIONS
You were seen and evaluated in the ED today for ankle pain.  X-ray did not reveal any acute fracture or dislocation.  You should rest, elevate, ice as tolerated.  Do not apply ice directly to skin.  You may take over-the-counter pain medications as needed for pain control.  Follow-up with primary care otherwise return to the ED with any new or worsening symptoms including but not limited to fevers, chest pain, overlying skin changes.

## 2025-03-22 NOTE — ED PROVIDER NOTES
History of Present Illness       History provided by: Patient  Limitations to History: None    HPI:  HPI   Patient is a 44-year-old male with a history of schizophrenia and type 2 diabetes that presents to the ED for ankle injury.  He states that he was stepping off a curb earlier today when he tripped but did not fall to the ground.  He has been having ankle pain ever since.  He has not taken any medications for the pain.  He is able to bear weight but it worsens his pain.  Denies hitting his head, loss of consciousness, nausea, vomiting.  He does not take any blood thinners.  He is requesting food.      Physical Exam   Physical Exam     VS: As documented in the triage note and EMR flowsheet from this visit were reviewed.    Appearance: Alert, oriented, cooperative, in no acute distress. Well nourished & well hydrated.    Skin: Atraumatic. Warm, intact and dry. No lesions, rash, or petechiae.    Eyes: PERRLA, EOMs intact. No pain with EOMs.     ENT: Hearing grossly intact. No drooling, dysphagia or trismus. Voice non-muffled.    Neck: Supple, without meningismus. No lymphadenopathy.     Pulmonary: Clear bilaterally with good chest wall excursion. No rales, rhonchi or wheezing. No accessory muscle use or stridor. Nonlabored breathing, no supplemental oxygen.     Cardiac: Normal S1, S2 without murmur.     Musculoskeletal: Spontaneously moving all extremities without limitation.  Extremities warm and well-perfused, capillary refill less than 2 seconds. Pulses full and equal. No lower extremity erythema or increased warmth. Calves nontender and without palpable cords. No TTP, cyanosis, clubbing or deformity noted.    Neurological:  Cranial nerves II through XII are grossly intact, normal sensation, no weakness, no focal findings identified. Ambulating without assistance with steady gait, non-ataxic.    Psychiatric: Appropriate mood and affect. Kempt appearance. Does not appear internally stimulated.  "            Medical Decision Making & ED Course     ED Course & [facility]       Medical Decision Making:  Medical Decision Making     ----  Patient is a 44-year-old male with a history of schizophrenia and type 2 diabetes that presents to the ED for ankle injury. History obtained from patient. History and physical exam are as documented above.  Blood pressure elevated 138/94.  All other vital signs are stable and within normal limits. Patient was seen and discussed with Dr. Garcia. Differential diagnoses considered include but are not limited to: Sprain, fracture, dislocation.    Patient resting comfortably in ED exam chair does not appear in acute distress.  Endorses pain affecting his left ankle.  Patient has full range of motion.  No tenderness on exam.  Distal pulses full and equal.  Low suspicion for fracture or dislocation however x-ray ordered.  Low suspicion for DVT, no unilateral swelling, increased warmth, calf tenderness.  Treated in the ED with Tylenol.  He is given ginger ale and sandwich.  X-ray did not reveal any acute fracture or dislocation.  Attempted to locate patient to review results and be evaluated by ED attending.  Unable to locate in the ED.  Patient left with treatment incomplete prior to being seen by ED attending.             Visit Vitals  BP (!) 138/94   Pulse 85   Temp 36.9 °C (98.4 °F)   Resp 16   Ht 1.727 m (5' 8\")   Wt (!) 159 kg (350 lb)   SpO2 98%   BMI 53.22 kg/m²   Smoking Status Never   BSA 2.76 m²        Labs Reviewed - No data to display    XR ankle left 3+ views    (Results Pending)       ED Course:  Diagnoses as of 03/22/25 0022   Left ankle pain, unspecified chronicity         Disposition   Left with treatment incomplete, prior to being seen by attending doctor.          Geovanna Daniel PA-C  Emergency Medicine      Geovanna Daniel PA-C  03/22/25 0132    "

## 2025-03-22 NOTE — ED TRIAGE NOTES
"Patient states earlier today he tripped coming off the curb and he is now concerned he injured his L ankle; also endorsing pain to the L wrist; no obvious injury or deformity; MSP's intact    Of note, patient has a hx of schizophrenia, denies SI/HI; states he is hearing voices \"saying to break me\", denies command hallucinations; otherwise PMHx of HTN and obesity   " Initial (On Arrival)

## 2025-03-25 ENCOUNTER — HOSPITAL ENCOUNTER (EMERGENCY)
Facility: HOSPITAL | Age: 45
Discharge: LEFT BEFORE DISCHARGE | End: 2025-03-25
Payer: COMMERCIAL

## 2025-03-25 VITALS
HEART RATE: 91 BPM | DIASTOLIC BLOOD PRESSURE: 94 MMHG | OXYGEN SATURATION: 95 % | BODY MASS INDEX: 47.74 KG/M2 | TEMPERATURE: 97.9 F | HEIGHT: 68 IN | RESPIRATION RATE: 16 BRPM | WEIGHT: 315 LBS | SYSTOLIC BLOOD PRESSURE: 144 MMHG

## 2025-03-25 PROCEDURE — 4500999001 HC ED NO CHARGE

## 2025-03-25 ASSESSMENT — PAIN SCALES - GENERAL: PAINLEVEL_OUTOF10: 10 - WORST POSSIBLE PAIN

## 2025-03-25 ASSESSMENT — PAIN - FUNCTIONAL ASSESSMENT: PAIN_FUNCTIONAL_ASSESSMENT: 0-10

## 2025-03-25 ASSESSMENT — PAIN DESCRIPTION - LOCATION: LOCATION: GENERALIZED

## 2025-03-26 NOTE — ED TRIAGE NOTES
"Patient presents to the ED for body aches. Patient is uncooperative in triage. Endorsing all over pain that has been going on \"forever\". Patient continues to laugh after every sentence. Denies SI/HI.  "

## 2025-04-03 ENCOUNTER — APPOINTMENT (OUTPATIENT)
Dept: RADIOLOGY | Facility: HOSPITAL | Age: 45
End: 2025-04-03
Payer: COMMERCIAL

## 2025-04-03 ENCOUNTER — HOSPITAL ENCOUNTER (EMERGENCY)
Facility: HOSPITAL | Age: 45
Discharge: HOME | End: 2025-04-03
Attending: EMERGENCY MEDICINE
Payer: COMMERCIAL

## 2025-04-03 VITALS
SYSTOLIC BLOOD PRESSURE: 150 MMHG | OXYGEN SATURATION: 96 % | DIASTOLIC BLOOD PRESSURE: 100 MMHG | TEMPERATURE: 98 F | HEART RATE: 87 BPM | RESPIRATION RATE: 18 BRPM

## 2025-04-03 DIAGNOSIS — M25.562 LEFT KNEE PAIN, UNSPECIFIED CHRONICITY: Primary | ICD-10-CM

## 2025-04-03 DIAGNOSIS — F20.9 SCHIZOPHRENIA, UNSPECIFIED TYPE: ICD-10-CM

## 2025-04-03 DIAGNOSIS — Z59.819 HOUSING INSECURITY: ICD-10-CM

## 2025-04-03 LAB — GLUCOSE BLD MANUAL STRIP-MCNC: 109 MG/DL (ref 74–99)

## 2025-04-03 PROCEDURE — 73564 X-RAY EXAM KNEE 4 OR MORE: CPT | Mod: LEFT SIDE | Performed by: RADIOLOGY

## 2025-04-03 PROCEDURE — 99283 EMERGENCY DEPT VISIT LOW MDM: CPT | Performed by: EMERGENCY MEDICINE

## 2025-04-03 PROCEDURE — 99284 EMERGENCY DEPT VISIT MOD MDM: CPT | Performed by: EMERGENCY MEDICINE

## 2025-04-03 PROCEDURE — 2500000001 HC RX 250 WO HCPCS SELF ADMINISTERED DRUGS (ALT 637 FOR MEDICARE OP)

## 2025-04-03 PROCEDURE — 2500000004 HC RX 250 GENERAL PHARMACY W/ HCPCS (ALT 636 FOR OP/ED)

## 2025-04-03 PROCEDURE — 82947 ASSAY GLUCOSE BLOOD QUANT: CPT

## 2025-04-03 PROCEDURE — 73564 X-RAY EXAM KNEE 4 OR MORE: CPT | Mod: LT

## 2025-04-03 RX ORDER — ONDANSETRON 4 MG/1
4 TABLET, ORALLY DISINTEGRATING ORAL ONCE
Status: COMPLETED | OUTPATIENT
Start: 2025-04-03 | End: 2025-04-03

## 2025-04-03 RX ORDER — ACETAMINOPHEN 325 MG/1
975 TABLET ORAL ONCE
Status: COMPLETED | OUTPATIENT
Start: 2025-04-03 | End: 2025-04-03

## 2025-04-03 RX ADMIN — ONDANSETRON 4 MG: 4 TABLET, ORALLY DISINTEGRATING ORAL at 19:38

## 2025-04-03 RX ADMIN — ACETAMINOPHEN 975 MG: 325 TABLET ORAL at 19:38

## 2025-04-03 SDOH — ECONOMIC STABILITY - HOUSING INSECURITY: HOUSING INSTABILITY UNSPECIFIED: Z59.819

## 2025-04-03 NOTE — ED PROVIDER NOTES
HPI   Chief Complaint   Patient presents with   • Fall       Patient is a 44-year-old male with history of hypertension, schizophrenia, substance abuse presenting with chief complaint of what he describes as a dislocated left knee.  Reports that he thinks that is because he has had pain in the knee since stepping awkwardly down to the curb of the bus 2 days ago.  Does not report falls or other traumatic injury.  Able to ambulate since.  Does not endorse pain in the rest of the leg otherwise.  On review of systems otherwise endorsing concern for 1 episode of vomiting earlier.  Denies abdominal pain, fevers, chills, diarrhea or other symptoms.  Unsure what caused the vomiting.  Denies alcohol or other drug use today.  Does not endorse feeling sick otherwise.            Patient History   Past Medical History:   Diagnosis Date   • Acute (undifferentiated) schizophrenia (Multi)      Past Surgical History:   Procedure Laterality Date   • CT ANGIO AORTA AND BILATERAL ILIOFEMORAL RUN OFF INCLUDING WITHOUT CONTRAST IF PERFORMED  11/3/2022    CT AORTA AND BILATERAL ILIOFEMORAL RUNOFF ANGIOGRAM W AND/OR WO IV CONTRAST [place]/20[place] [facility] [place] [facility] [place]     No family history on file.  Social History     Tobacco Use   • Smoking status: Never   • Smokeless tobacco: Never   Vaping Use   • Vaping status: Never Used   Substance Use Topics   • Alcohol use: Never   • Drug use: Not on file       Physical Exam   ED Triage Vitals [04/03/25 1904]   Temperature Heart Rate Respirations BP   36.7 °C (98 °F) 87 18 (!) 150/100      Pulse Ox Temp src Heart Rate Source Patient Position   96 % -- -- --      BP Location FiO2 (%)     -- --       Physical Exam  Constitutional:       Appearance: Normal appearance.   HENT:      Head: Normocephalic and atraumatic.   Eyes:      Extraocular Movements: Extraocular movements intact.   Cardiovascular:      Rate and Rhythm: Normal rate.   Pulmonary:      Effort: Pulmonary effort is normal.   Abdominal:       Comments: Significantly obese, soft, nondistended, nontender to palpation   Musculoskeletal:      Cervical back: Normal range of motion.      Comments: Ambulates without deficit.  Weightbearing without limp.  Reported pain over the left knee.  No laxity with anterior posterior drawer test.  No laxity with valgus or varus movements.   Skin:     General: Skin is warm and dry.   Neurological:      General: No focal deficit present.      Mental Status: He is alert and oriented to person, place, and time.      Comments: No sensory loss in lower extremities.  Has urinated on himself but denies incontinence.  Able to stand with no focal weakness in the lower extremities.   Psychiatric:         Mood and Affect: Mood normal.         Behavior: Behavior normal.           ED Course & MDM   Diagnoses as of 04/05/25 1400   Left knee pain, unspecified chronicity   Schizophrenia, unspecified type   Housing insecurity                 No data recorded     [place] Coma Scale Score: 15 (04/03/25 1904 : Rosibel Mcnair RN)                           Medical Decision Making  Patient is a 44-year-old male with history of hypertension, schizophrenia, substance abuse presenting with chief complaint of what he describes as a dislocated left knee.  Exam generally reassuring, able to ambulate.  X-rays of knee obtained with no evidence of traumatic injury but has chronic osteoarthrosis likely contributing to symptoms.  Endorsing concern for episode of vomiting in triage but otherwise with benign abdominal exam, benign vitals, largely asymptomatic now.  Point-of-care glucose within normal limits and given well appearance, benign abdominal exam, lack of other symptoms, additional labs and imaging felt to be low diagnostic utility at this time.  Advised to follow-up with orthopedics for osteoarthrosis.  Return precautions and appropriate follow-up discussed with patient and patient discharged home.    Patient seen and discussed with   Jean Arnold MD, PhD  Emergency Medicine PGY3          Procedure  Procedures     Alistair Arnold MD  Resident  04/03/25 2226    Emergency Medicine Attending Attestation:     Diagnoses as of 04/05/25 1400   Left knee pain, unspecified chronicity   Schizophrenia, unspecified type   Housing insecurity       The patient was seen by the resident/fellow.  I have personally performed a substantive portion of the encounter.  I have seen and examined the patient; agree with the workup, evaluation, MDM, management and diagnosis.  The care plan has been discussed with the resident; I have reviewed the resident’s note and agree with the documented findings.                  MD Jess Hogan MD  04/05/25 1400

## 2025-04-04 ENCOUNTER — HOSPITAL ENCOUNTER (EMERGENCY)
Facility: HOSPITAL | Age: 45
Discharge: HOME | End: 2025-04-04
Attending: EMERGENCY MEDICINE
Payer: COMMERCIAL

## 2025-04-04 VITALS
HEIGHT: 68 IN | WEIGHT: 315 LBS | RESPIRATION RATE: 17 BRPM | SYSTOLIC BLOOD PRESSURE: 132 MMHG | TEMPERATURE: 97.8 F | OXYGEN SATURATION: 96 % | BODY MASS INDEX: 47.74 KG/M2 | HEART RATE: 87 BPM | DIASTOLIC BLOOD PRESSURE: 81 MMHG

## 2025-04-04 DIAGNOSIS — G89.29 CHRONIC BILATERAL LOW BACK PAIN, UNSPECIFIED WHETHER SCIATICA PRESENT: Primary | ICD-10-CM

## 2025-04-04 DIAGNOSIS — Z59.819 HOUSING INSECURITY: ICD-10-CM

## 2025-04-04 DIAGNOSIS — E66.9 OBESITY, UNSPECIFIED CLASS, UNSPECIFIED OBESITY TYPE, UNSPECIFIED WHETHER SERIOUS COMORBIDITY PRESENT: ICD-10-CM

## 2025-04-04 DIAGNOSIS — M54.50 CHRONIC BILATERAL LOW BACK PAIN, UNSPECIFIED WHETHER SCIATICA PRESENT: Primary | ICD-10-CM

## 2025-04-04 PROCEDURE — 99284 EMERGENCY DEPT VISIT MOD MDM: CPT | Performed by: EMERGENCY MEDICINE

## 2025-04-04 PROCEDURE — 99283 EMERGENCY DEPT VISIT LOW MDM: CPT | Performed by: EMERGENCY MEDICINE

## 2025-04-04 SDOH — ECONOMIC STABILITY - HOUSING INSECURITY: HOUSING INSTABILITY UNSPECIFIED: Z59.819

## 2025-04-04 ASSESSMENT — PAIN - FUNCTIONAL ASSESSMENT: PAIN_FUNCTIONAL_ASSESSMENT: 0-10

## 2025-04-04 ASSESSMENT — PAIN SCALES - GENERAL: PAINLEVEL_OUTOF10: 3
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
## 2025-04-04 NOTE — DISCHARGE INSTRUCTIONS
You have evidence of osteoarthrosis but no acute traumatic injury of the knee.  Would recommend following up with orthopedics for consideration of further treatment.    --   [facility]   [facility]  Phone: [phone]

## 2025-04-04 NOTE — ED TRIAGE NOTES
"Pt C/O pain to left wrist, left knee, and low back pain. Pt seen and treated at this facility yesterday for same complaint. Pt concerned due to \"not feeling better\". VSS, RespE&U, ambulatory.   "

## 2025-04-05 NOTE — ED PROVIDER NOTES
HPI   Chief Complaint   Patient presents with   • Leg Pain   • Back Pain   • Wrist Pain       HPI    Patient presents for second time in two days complaining of left knee and lower back pain  We did xrays of his left knee yesterday and there were no acute findings - reviewed this again with patient   He reports he is still having pain     Is able to bear weight    No new falls or new complaints     Patient History   Past Medical History:   Diagnosis Date   • Acute (undifferentiated) schizophrenia (Multi)      Past Surgical History:   Procedure Laterality Date   • CT ANGIO AORTA AND BILATERAL ILIOFEMORAL RUN OFF INCLUDING WITHOUT CONTRAST IF PERFORMED  11/3/2022    CT AORTA AND BILATERAL ILIOFEMORAL RUNOFF ANGIOGRAM W AND/OR WO IV CONTRAST 11/3/2022 [facility] [place] [facility]     No family history on file.  Social History     Tobacco Use   • Smoking status: Never   • Smokeless tobacco: Never   Vaping Use   • Vaping status: Never Used   Substance Use Topics   • Alcohol use: Never   • Drug use: Not on file       Physical Exam   ED Triage Vitals [04/04/25 1720]   Temperature Heart Rate Respirations BP   36.6 °C (97.8 °F) 87 17 132/81      Pulse Ox Temp Source Heart Rate Source Patient Position   96 % Oral Monitor Sitting      BP Location FiO2 (%)     Left arm --       Physical Exam  Constitutional:       Comments: Obese, disheveled but awake and oriented x 3   HENT:      Head: Atraumatic.   Eyes:      Pupils: Pupils are equal, round, and reactive to light.   Cardiovascular:      Rate and Rhythm: Normal rate.   Pulmonary:      Effort: Pulmonary effort is normal.   Abdominal:      Comments: Obese NT   Musculoskeletal:         General: No deformity.      Cervical back: Neck supple.   Skin:     General: Skin is warm and dry.   Neurological:      General: No focal deficit present.           ED Course & MDM   Diagnoses as of 04/05/25 1419   Chronic bilateral low back pain, unspecified whether sciatica present   Obesity, unspecified  class, unspecified obesity type, unspecified whether serious comorbidity present   Housing insecurity                 No data recorded     [place] Coma Scale Score: 15 (04/04/25 1719 : Rm Adkins RN)                           Medical Decision Making  Reviewed patient exam and past imaging - no new falls or trauma so reviewed with patient that he doesn't require new imaging  Given food which he tolerated without difficulty  Does have a rollator which he is able to use to assist with walking    Offered social work intervention to help with housing and follow up but patient declined         Procedure  Procedures     Jess Pena MD  04/05/25 0760

## 2025-04-19 ENCOUNTER — HOSPITAL ENCOUNTER (EMERGENCY)
Facility: HOSPITAL | Age: 45
Discharge: LEFT BEFORE DISCHARGE | End: 2025-04-19
Payer: COMMERCIAL

## 2025-04-19 ENCOUNTER — HOSPITAL ENCOUNTER (EMERGENCY)
Facility: HOSPITAL | Age: 45
Discharge: HOME | End: 2025-04-19
Attending: EMERGENCY MEDICINE
Payer: COMMERCIAL

## 2025-04-19 ENCOUNTER — HOSPITAL ENCOUNTER (EMERGENCY)
Facility: HOSPITAL | Age: 45
Discharge: OTHER INSTITUTION | End: 2025-04-19
Payer: COMMERCIAL

## 2025-04-19 VITALS
WEIGHT: 315 LBS | DIASTOLIC BLOOD PRESSURE: 68 MMHG | SYSTOLIC BLOOD PRESSURE: 128 MMHG | HEIGHT: 68 IN | OXYGEN SATURATION: 96 % | RESPIRATION RATE: 17 BRPM | BODY MASS INDEX: 47.74 KG/M2 | HEART RATE: 83 BPM | TEMPERATURE: 96.1 F

## 2025-04-19 VITALS
TEMPERATURE: 97.1 F | OXYGEN SATURATION: 98 % | WEIGHT: 300 LBS | HEIGHT: 68 IN | HEART RATE: 97 BPM | DIASTOLIC BLOOD PRESSURE: 87 MMHG | BODY MASS INDEX: 45.47 KG/M2 | RESPIRATION RATE: 16 BRPM | SYSTOLIC BLOOD PRESSURE: 145 MMHG

## 2025-04-19 DIAGNOSIS — M25.561 CHRONIC PAIN OF BOTH KNEES: Primary | ICD-10-CM

## 2025-04-19 DIAGNOSIS — M25.562 CHRONIC PAIN OF BOTH KNEES: Primary | ICD-10-CM

## 2025-04-19 DIAGNOSIS — G89.29 CHRONIC PAIN OF BOTH KNEES: Primary | ICD-10-CM

## 2025-04-19 PROCEDURE — 99283 EMERGENCY DEPT VISIT LOW MDM: CPT

## 2025-04-19 PROCEDURE — 2500000001 HC RX 250 WO HCPCS SELF ADMINISTERED DRUGS (ALT 637 FOR MEDICARE OP)

## 2025-04-19 PROCEDURE — 99283 EMERGENCY DEPT VISIT LOW MDM: CPT | Performed by: EMERGENCY MEDICINE

## 2025-04-19 RX ORDER — ACETAMINOPHEN 325 MG/1
975 TABLET ORAL ONCE
Status: COMPLETED | OUTPATIENT
Start: 2025-04-19 | End: 2025-04-19

## 2025-04-19 RX ORDER — IBUPROFEN 400 MG/1
400 TABLET ORAL ONCE
Status: COMPLETED | OUTPATIENT
Start: 2025-04-19 | End: 2025-04-19

## 2025-04-19 RX ADMIN — ACETAMINOPHEN 975 MG: 325 TABLET ORAL at 17:30

## 2025-04-19 RX ADMIN — IBUPROFEN 400 MG: 400 TABLET ORAL at 17:30

## 2025-04-19 ASSESSMENT — LIFESTYLE VARIABLES
HAVE PEOPLE ANNOYED YOU BY CRITICIZING YOUR DRINKING: NO
EVER FELT BAD OR GUILTY ABOUT YOUR DRINKING: NO
HAVE YOU EVER FELT YOU SHOULD CUT DOWN ON YOUR DRINKING: NO
EVER HAD A DRINK FIRST THING IN THE MORNING TO STEADY YOUR NERVES TO GET RID OF A HANGOVER: NO
TOTAL SCORE: 0

## 2025-04-19 NOTE — ED PROVIDER NOTES
Emergency Department Provider Note        History of Present Illness     History provided by: Patient  Limitations to History: None  External Records Reviewed with Brief Summary: I reviewed prior ED visits, Care Everywhere, discharge summaries and outpatient records as appropriate.       HPI:  Anibal Caro is a 44 y.o. male past medical history of schizophrenia, substance use disorder and arthritis presenting to the emergency department with concern for bilateral knee pain.  States the weather change is making his knees ache, denies any falls or trauma.  No other acute complaints.  Denies chest pain or difficulty breathing or shortness of breath, no SI or HI on exam    Physical Exam   Triage vitals:  T 35.6 °C (96.1 °F)  HR 83  /68  RR 17  O2 96 % None (Room air)    General: Awake, alert, in no acute distress  Eyes: Gaze conjugate.  No scleral icterus or injection  HENT: Normo-cephalic, atraumatic. No stridor  CV: Regular rate, regular rhythm. Radial pulses 2+ bilaterally  Resp: Breathing non-labored, speaking in full sentences.  Clear to auscultation bilaterally  GI: Soft, non-distended, non-tender. No rebound or guarding.  MSK/Extremities: No gross bony deformities. Moving all extremities  Skin: Warm. Appropriate color  Neuro: Alert. Oriented. Face symmetric. Speech is fluent.  Gross strength and sensation intact in b/l UE and LEs  Psych: Appropriate mood and affect    Medical Decision Making & ED Course   Medical Decision Makin y.o. male presenting for evaluation of bilateral knee pain.  He is hemodynamically stable and in no acute distress, patient provided food and drink, given Tylenol and ibuprofen for symptoms.  He has had several x-rays in the past week for his knees so deferred imaging today as there is no recent trauma or falls since last time he underwent imaging.  Do not feel that further workup indicated at this time. Discussed results, diagnosis/differential, and plan with patient.  Patient advised to follow up with primary physician in 2-3 days. Discussed return precautions and encouraged patient to return to the Emergency Department for any concerning symptoms or worsening condition. Patient expresses understanding and is in agreement. All questions answered. Patient discharged in stable condition.  ----     Social Determinants of Health which Significantly Impact Care: None identified     EKG Independent Interpretation: EKG not obtained    Independent Result Review and Interpretation: Relevant laboratory and radiographic results were reviewed and independently interpreted by myself.  As necessary, they are commented on in the ED Course.    Chronic conditions affecting the patient's care: None    The patient was discussed with the following consultants/services: None    Care Considerations: None    ED Course:  Diagnoses as of 04/19/25 1735   Chronic pain of both knees     Disposition   As a result of the work-up, the patient was discharged home.  he was informed of his diagnosis and instructed to come back with any concerns or worsening of condition.  he and was agreeable to the plan as discussed above.  he was given the opportunity to ask questions.  All of the patient's questions were answered.    Procedures   Procedures    Patient seen and discussed with ED attending physician.    Alba Noe DO  Emergency Medicine       Alba Noe DO  Resident  04/19/25 1748

## 2025-04-20 ENCOUNTER — HOSPITAL ENCOUNTER (EMERGENCY)
Facility: HOSPITAL | Age: 45
Discharge: LEFT BEFORE DISCHARGE | End: 2025-04-20
Payer: COMMERCIAL

## 2025-04-20 PROCEDURE — 99283 EMERGENCY DEPT VISIT LOW MDM: CPT

## 2025-04-20 NOTE — ED PROVIDER NOTES
Emergency Department Provider Note         History of Present Illness      History provided by: Patient  Limitations to History: None  External Records Reviewed with Brief Summary: I reviewed prior ED visits, Care Everywhere, discharge summaries and outpatient records as appropriate.         HPI:  Anibal Caro is a 44 y.o. male past medical history of schizophrenia, substance use disorder and arthritis presenting to the emergency department with concern for bilateral knee pain.  States the weather change is making his knees ache, denies any falls or trauma.  No other acute complaints.  Denies chest pain or difficulty breathing or shortness of breath, no SI or HI on exam.  Is requesting food and states he is really hungry also requesting a bus pass     Physical Exam   Triage vitals:  T 35.6 °C (96.1 °F)  HR 83  /68  RR 17  O2 96 % None (Room air)     General: Awake, alert, in no acute distress  Eyes: Gaze conjugate.  No scleral icterus or injection  HENT: Normo-cephalic, atraumatic. No stridor  CV: Regular rate, regular rhythm. Radial pulses 2+ bilaterally  Resp: Breathing non-labored, speaking in full sentences.  Clear to auscultation bilaterally  GI: Soft, non-distended, non-tender. No rebound or guarding.  MSK/Extremities: No gross bony deformities. Moving all extremities  Skin: Warm. Appropriate color  Neuro: Alert. Oriented. Face symmetric. Speech is fluent.  Gross strength and sensation intact in b/l UE and LEs  Psych: Appropriate mood and affect     Medical Decision Making & ED Course   Medical Decision Makin y.o. male presenting for evaluation of bilateral knee pain.  He is hemodynamically stable and in no acute distress, patient provided food and drink, given Tylenol and ibuprofen for symptoms.  He has had several x-rays in the past week for his knees so deferred imaging today as there is no recent trauma or falls since last time he underwent imaging.  Provided with food, a bus pass and a  dose of Tylenol and ibuprofen.  Patient safely discharged with strict return precautions in stable condition  ----     Social Determinants of Health which Significantly Impact Care: None identified      EKG Independent Interpretation: EKG not obtained     Independent Result Review and Interpretation: Relevant laboratory and radiographic results were reviewed and independently interpreted by myself.  As necessary, they are commented on in the ED Course.     Chronic conditions affecting the patient's care: None     The patient was discussed with the following consultants/services: None     Care Considerations: None     ED Course:  Diagnoses as of 04/19/25 1735   Chronic pain of both knees      Disposition   As a result of the work-up, the patient was discharged home.  he was informed of his diagnosis and instructed to come back with any concerns or worsening of condition.  he and was agreeable to the plan as discussed above.  he was given the opportunity to ask questions.  All of the patient's questions were answered.     Procedures   Procedures     Patient seen and discussed with ED attending physician.     Alba Noe DO  Emergency Medicine       Alba Noe DO  Resident  04/19/25 8802

## 2025-04-22 ENCOUNTER — HOSPITAL ENCOUNTER (EMERGENCY)
Facility: HOSPITAL | Age: 45
Discharge: LEFT BEFORE DISCHARGE | End: 2025-04-22
Payer: COMMERCIAL

## 2025-04-22 VITALS
WEIGHT: 300 LBS | HEART RATE: 103 BPM | DIASTOLIC BLOOD PRESSURE: 90 MMHG | OXYGEN SATURATION: 98 % | SYSTOLIC BLOOD PRESSURE: 146 MMHG | HEIGHT: 68 IN | BODY MASS INDEX: 45.47 KG/M2 | TEMPERATURE: 97.7 F

## 2025-04-22 PROCEDURE — 99283 EMERGENCY DEPT VISIT LOW MDM: CPT

## 2025-04-22 PROCEDURE — 4500999001 HC ED NO CHARGE

## 2025-04-22 ASSESSMENT — PAIN - FUNCTIONAL ASSESSMENT: PAIN_FUNCTIONAL_ASSESSMENT: 0-10

## 2025-04-22 ASSESSMENT — PAIN DESCRIPTION - PAIN TYPE: TYPE: ACUTE PAIN

## 2025-04-22 ASSESSMENT — PAIN SCALES - GENERAL: PAINLEVEL_OUTOF10: 0 - NO PAIN

## 2025-04-22 ASSESSMENT — PAIN DESCRIPTION - LOCATION: LOCATION: LEG
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
## 2025-04-23 ENCOUNTER — HOSPITAL ENCOUNTER (EMERGENCY)
Facility: HOSPITAL | Age: 45
Discharge: HOME | End: 2025-04-23
Attending: EMERGENCY MEDICINE
Payer: COMMERCIAL

## 2025-04-23 VITALS
HEIGHT: 68 IN | DIASTOLIC BLOOD PRESSURE: 77 MMHG | HEART RATE: 88 BPM | RESPIRATION RATE: 18 BRPM | OXYGEN SATURATION: 99 % | BODY MASS INDEX: 45.47 KG/M2 | WEIGHT: 300 LBS | TEMPERATURE: 97.9 F | SYSTOLIC BLOOD PRESSURE: 150 MMHG

## 2025-04-23 DIAGNOSIS — M25.562 CHRONIC PAIN OF LEFT KNEE: Primary | ICD-10-CM

## 2025-04-23 DIAGNOSIS — G89.29 CHRONIC PAIN OF LEFT KNEE: Primary | ICD-10-CM

## 2025-04-23 PROCEDURE — 99283 EMERGENCY DEPT VISIT LOW MDM: CPT | Performed by: EMERGENCY MEDICINE

## 2025-04-23 PROCEDURE — 99281 EMR DPT VST MAYX REQ PHY/QHP: CPT | Performed by: EMERGENCY MEDICINE

## 2025-04-23 NOTE — DISCHARGE INSTRUCTIONS
Follow up in 1-2 days with your doctor. Return to ED at anytime if your symptoms worsen, or return.

## 2025-04-23 NOTE — ED PROVIDER NOTES
"EMERGENCY DEPARTMENT ENCOUNTER      Pt Name: Anibal Caro  MRN: [id number]  Birthdate 1980  Date of evaluation: 4/23/2025  Provider: Eladio Saenz DO    CHIEF COMPLAINT       Chief Complaint   Patient presents with    Knee Pain         HISTORY OF PRESENT ILLNESS    44-year-old man comes emerged with left knee pain this been chronic, patient has been seen in the ER almost every day in the month of April, has had x-rays of his left wrist and his left knee done.  He has no other medical complaints today aside from the fact that he says that he \"cut his left wrist\".          Nursing Notes were reviewed.    PAST MEDICAL HISTORY   Medical History[1]      SURGICAL HISTORY     Surgical History[2]      CURRENT MEDICATIONS       Previous Medications    ALBUTEROL 90 MCG/ACTUATION INHALER    Inhale 2 puffs every 4 hours if needed for wheezing or shortness of breath.    AMLODIPINE (NORVASC) 5 MG TABLET    Take 1 tablet (5 mg) by mouth once daily.    AMLODIPINE (NORVASC) 5 MG TABLET    Take 1 tablet (5 mg) by mouth once daily.    BENZTROPINE (COGENTIN) 1 MG TABLET    Take 1 tablet (1 mg) by mouth once daily at bedtime.    CHOLECALCIFEROL (VITAMIN D-3) 5,000 UNITS TABLET    Take 1 tablet (125 mcg) by mouth once daily.    CYCLOBENZAPRINE (FLEXERIL) 10 MG TABLET    Take 1 tablet (10 mg) by mouth 2 times a day as needed for muscle spasms for up to 10 days.    DIVALPROEX (DEPAKOTE ER) 500 MG 24 HR TABLET    Take 1 tablet (500 mg) by mouth 2 times a day. Do not crush, chew, or split.    DIVALPROEX (DEPAKOTE ER) 500 MG 24 HR TABLET    Take 1 tablet (500 mg) by mouth 2 times a day. Do not crush, chew, or split.    DIVALPROEX (DEPAKOTE) 500 MG EC TABLET    Take 1 tablet (500 mg) by mouth twice a day.    GABAPENTIN (NEURONTIN) 100 MG CAPSULE    Take 3 capsules (300 mg) by mouth 3 times a day.    GABAPENTIN (NEURONTIN) 300 MG CAPSULE    Take by mouth.    HALOPERIDOL DECANOATE (HALDOL DECANOATE) 100 MG/ML INJECTION    Inject 2.25 mL (225 " mg) into the muscle every 28 (twenty-eight) days.    HYDROXYZINE HCL (ATARAX) 50 MG TABLET    Take by mouth 2 times a day as needed.    MELATONIN 3 MG TABLET    Take 2 tablets (6 mg) by mouth once daily at bedtime.    NALTREXONE ER (VIVITROL) INJECTION    Inject 4 mL (380 mg) into the muscle.    OLANZAPINE (ZYPREXA) 20 MG TABLET    Take 1 tablet (20 mg) by mouth once daily at bedtime.    RISPERIDONE (RISPERDAL) 2 MG TABLET    Take 1.5 tablets (3 mg) by mouth once daily.    TRAZODONE (DESYREL) 150 MG TABLET    Take 1 tablet (150 mg) by mouth once daily at bedtime.       ALLERGIES     Amoxicillin, Bee pollen, Grass pollen, and Pollen extracts    FAMILY HISTORY     Family History[3]       SOCIAL HISTORY     Social History[4]    SCREENINGS                        PHYSICAL EXAM    (up to 7 for level 4, 8 or more for level 5)     ED Triage Vitals [04/23/25 0207]   Temperature Heart Rate Respirations BP   36.6 °C (97.9 °F) 88 18 150/77      Pulse Ox Temp src Heart Rate Source Patient Position   99 % -- -- --      BP Location FiO2 (%)     -- --       Physical Exam  Vitals and nursing note reviewed.   Constitutional:       Appearance: Normal appearance.   HENT:      Head: Normocephalic and atraumatic.   Eyes:      General: No scleral icterus.        Right eye: No discharge.         Left eye: No discharge.      Conjunctiva/sclera: Conjunctivae normal.   Cardiovascular:      Rate and Rhythm: Normal rate and regular rhythm.   Pulmonary:      Effort: Pulmonary effort is normal. No respiratory distress.   Abdominal:      General: There is no distension.   Musculoskeletal:      Cervical back: Normal range of motion.      Comments: No lacerations of the left wrist, no pain range of motion of the left wrist, no anatomical snuffbox tenderness.  He has full range of motion of flexion extension of his left knee, no pain on range of motion of his left knee.   Skin:     General: Skin is warm and dry.   Neurological:      Mental Status: He  "is alert. Mental status is at baseline.   Psychiatric:         Mood and Affect: Mood normal.         Behavior: Behavior normal.          DIAGNOSTIC RESULTS     LABS:  Labs Reviewed - No data to display    All other labs were within normal range or not returned as of this dictation.    Imaging  No orders to display        Procedures  Procedures     EMERGENCY DEPARTMENT COURSE/MDM:     Diagnoses as of 04/23/25 0421   Chronic pain of left knee        Medical Decision Making  44-year-old male comes emergency room stating that his left knee is \"dislocated\".  And that he \"cut his left wrist\".  This patient is well-known to this emergency department.  Oh for the month of April he he has been to the emergency room almost every day.  He has had an x-ray of his left knee on April 13, it was unremarkable.  X-ray of his wrist as well on April 30 which was unremarkable.  Today he is flexing and extending his left knee, does not have any pain on passive range of motion, I do not believe clinically his left knee is dislocated.  In regards to his left wrist he has full range of motion of his left wrist has no pain on passive range of motion, no pain in the anatomical snuffbox, has a good radial pulse on the left, there are no obvious or visible left wrist lacerations.  I did inform patient that given history and physical today there is no indication for any further imaging.  He was concerned about tendon or ligament injuries which I told him here to follow-up with his doctor and get an MRI for.  Did give him a ginger ale here patient was discharged at this time        Patient and or family in agreement and understanding of treatment plan.  All questions answered.      I reviewed the case with the attending ED physician. The attending ED physician agrees with the plan. Patient and/or patient´s representative was counseled regarding labs, imaging, likely diagnosis, and plan. All questions were answered.    Final Impression:   1. " Chronic pain of left knee          (Please note that portions of this note were completed with a voice recognition program.  Efforts were made to edit the dictations but occasionally words are mis-transcribed.)       [1]   Past Medical History:  Diagnosis Date    Acute (undifferentiated) schizophrenia (Multi)    [2]   Past Surgical History:  Procedure Laterality Date    CT ANGIO AORTA AND BILATERAL ILIOFEMORAL RUN OFF INCLUDING WITHOUT CONTRAST IF PERFORMED  11/3/2022    CT AORTA AND BILATERAL ILIOFEMORAL RUNOFF ANGIOGRAM W AND/OR WO IV CONTRAST [place] [facility] [place]   [3] No family history on file.  [4]   Social History  Socioeconomic History    Marital status: Single   Tobacco Use    Smoking status: Never    Smokeless tobacco: Never   Vaping Use    Vaping status: Never Used   Substance and Sexual Activity    Alcohol use: Never     Social Drivers of Health     Financial Resource Strain: Medium Risk (10/25/2024)    Received from [facility]    Overall Financial Resource Strain (CARDIA)     Difficulty of Paying Living Expenses: Somewhat hard   Food Insecurity: Food Insecurity Present (4/13/2025)    Received from [facility]    Hunger Vital Sign     Worried About Running Out of Food in the Last Year: Never true     Ran Out of Food in the Last Year: Often true   Transportation Needs: No Transportation Needs (10/25/2024)    Received from [facility]    PRAPARE - Transportation     Lack of Transportation (Medical): No     Lack of Transportation (Non-Medical): No   Physical Activity: Not on File (8/24/2019)    Received from [facility]    Physical Activity     Physical Activity: 0   Stress: Not on File (8/24/2019)    Received from [facility]    Stress     Stress: 0   Social Connections: Not on File (9/15/2024)    Received from [facility]    Social Connections     Connectedness: 0   Housing Stability: High Risk (4/4/2025)    Housing Stability Vital Sign     Unable to Pay for Housing in the Last Year: Yes      Homeless in the Last Year: Yes        Eladio Saenz DO  Resident  04/23/25 0765

## 2025-04-23 NOTE — ED TRIAGE NOTES
Pt to ED c/o left knee and wrist pain x 4 months, pt sttaes he was trying to walk and feeling his weight shift. Pt endorsing cramping to wrist as well.

## 2025-05-02 ENCOUNTER — HOSPITAL ENCOUNTER (EMERGENCY)
Facility: HOSPITAL | Age: 45
Discharge: HOME | End: 2025-05-02
Payer: COMMERCIAL

## 2025-05-02 VITALS
DIASTOLIC BLOOD PRESSURE: 84 MMHG | OXYGEN SATURATION: 96 % | RESPIRATION RATE: 20 BRPM | BODY MASS INDEX: 45.47 KG/M2 | WEIGHT: 300 LBS | SYSTOLIC BLOOD PRESSURE: 155 MMHG | TEMPERATURE: 98.9 F | HEART RATE: 93 BPM | HEIGHT: 68 IN

## 2025-05-02 PROCEDURE — 99283 EMERGENCY DEPT VISIT LOW MDM: CPT

## 2025-05-02 PROCEDURE — 4500999001 HC ED NO CHARGE

## 2025-05-02 ASSESSMENT — PAIN SCALES - GENERAL: PAINLEVEL_OUTOF10: 0 - NO PAIN

## 2025-05-02 ASSESSMENT — PAIN DESCRIPTION - PAIN TYPE: TYPE: ACUTE PAIN

## 2025-05-02 ASSESSMENT — PAIN - FUNCTIONAL ASSESSMENT: PAIN_FUNCTIONAL_ASSESSMENT: 0-10

## 2025-05-02 NOTE — ED TRIAGE NOTES
Pt returns to the ED seeking information about detox/rehab and [facility]. Pt states he uses marijuana and crack/cocaine. Last use was last night.     Hx of schizophrenia and substance abuse.

## 2025-05-05 NOTE — ED NOTES
Anibal Caro was assessed by a Substance Use Navigator Specialist ([facility]) at 3:00 PM.     Contact Number obtained during encounter: [phone].     Medical History: Medical History[1]     Psychiatric History: Schizophrenia.     Social History:   Social History     Socioeconomic History    Marital status: Single     Spouse name: Not on file    Number of children: Not on file    Years of education: Not on file    Highest education level: Not on file   Occupational History    Not on file   Tobacco Use    Smoking status: Never    Smokeless tobacco: Never   Vaping Use    Vaping status: Never Used   Substance and Sexual Activity    Alcohol use: Never    Drug use: Not on file    Sexual activity: Not on file   Other Topics Concern    Not on file   Social History Narrative    Not on file     Social Drivers of Health     Financial Resource Strain: Medium Risk (10/25/2024)    Received from [facility]    Overall Financial Resource Strain (CARDIA)     Difficulty of Paying Living Expenses: Somewhat hard   Food Insecurity: Food Insecurity Present (4/13/2025)    Received from [facility]    Hunger Vital Sign     Worried About Running Out of Food in the Last Year: Never true     Ran Out of Food in the Last Year: Often true   Transportation Needs: No Transportation Needs (10/25/2024)    Received from [facility]    PRAPARE - Transportation     Lack of Transportation (Medical): No     Lack of Transportation (Non-Medical): No   Physical Activity: Not on File (8/24/2019)    Received from [facility]    Physical Activity     Physical Activity: 0   Stress: Not on File (8/24/2019)    Received from [facility]    Stress     Stress: 0   Social Connections: Not on File (9/15/2024)    Received from [facility]    Social Connections     Connectedness: 0   Intimate Partner Violence: Not on file   Housing Stability: High Risk (4/4/2025)    Housing Stability Vital Sign     Unable to Pay for Housing in the Last Year: Yes     Number of Times Moved  "in the Last Year: Not on file     Homeless in the Last Year: Yes        Substance(s) used:     - Pt endorses crack cocaine and marijuana use w/ last use \"last night\" 5/1.     Brief Summary of Assessment:     - Pt appropriate and agreeable for inpatient treatment at [facility] facility.    Diagnosis / Diagnostic Impression:     - Crack cocaine use.     Summary / Plan:    - [facility] coordinated placement to [facility] ([address] [place] [address]).     Patient interested in treatment: Yes    Disposition:     - LWBS after triage ([facility] coordinated w/ patient outside of ED lobby; pt stated he did not wish to be seen in the ED, denied medical and psychiatric complaints; stated he is only seeking inpatient [facility] treatment).   - Accepted to [facility].     Transportation Provided: Yes (transportation provided by [facility])    Jonathan Harley, EMT-Paramedic  [facility] (Substance Use Navigation Specialist)  [email or web address]  Ph: [phone].         [1]   Past Medical History:  Diagnosis Date    Acute (undifferentiated) schizophrenia (Multi)         Jonathan Harley, EMT  05/05/25 1438    "

## 2025-05-06 ENCOUNTER — HOSPITAL ENCOUNTER (EMERGENCY)
Facility: HOSPITAL | Age: 45
Discharge: PSYCHIATRIC OR SUBSTANCE USE FACILITY | End: 2025-05-07
Attending: EMERGENCY MEDICINE
Payer: COMMERCIAL

## 2025-05-06 ENCOUNTER — CLINICAL SUPPORT (OUTPATIENT)
Dept: EMERGENCY MEDICINE | Facility: HOSPITAL | Age: 45
End: 2025-05-06
Payer: COMMERCIAL

## 2025-05-06 DIAGNOSIS — R46.89 AGGRESSIVE BEHAVIOR: Primary | ICD-10-CM

## 2025-05-06 LAB
ALBUMIN SERPL BCP-MCNC: 3.8 G/DL (ref 3.4–5)
ALP SERPL-CCNC: 77 U/L (ref 33–120)
ALT SERPL W P-5'-P-CCNC: 21 U/L (ref 10–52)
AMPHETAMINES UR QL SCN: ABNORMAL
ANION GAP SERPL CALC-SCNC: 15 MMOL/L (ref 10–20)
APAP SERPL-MCNC: <10 UG/ML (ref ?–30)
AST SERPL W P-5'-P-CCNC: 23 U/L (ref 9–39)
ATRIAL RATE: 83 BPM
BARBITURATES UR QL SCN: ABNORMAL
BASOPHILS # BLD MANUAL: 0.1 X10*3/UL (ref 0–0.1)
BASOPHILS NFR BLD MANUAL: 1.6 %
BENZODIAZ UR QL SCN: ABNORMAL
BILIRUB SERPL-MCNC: 0.4 MG/DL (ref 0–1.2)
BLASTS # BLD MANUAL: 0 X10*3/UL
BLASTS NFR BLD MANUAL: 0 %
BUN SERPL-MCNC: 12 MG/DL (ref 6–23)
BZE UR QL SCN: ABNORMAL
CALCIUM SERPL-MCNC: 9 MG/DL (ref 8.6–10.6)
CANNABINOIDS UR QL SCN: ABNORMAL
CHLORIDE SERPL-SCNC: 102 MMOL/L (ref 98–107)
CO2 SERPL-SCNC: 24 MMOL/L (ref 21–32)
CREAT SERPL-MCNC: 0.88 MG/DL (ref 0.5–1.3)
EGFRCR SERPLBLD CKD-EPI 2021: >90 ML/MIN/1.73M*2
EOSINOPHIL # BLD MANUAL: 0 X10*3/UL (ref 0–0.7)
EOSINOPHIL NFR BLD MANUAL: 0 %
ERYTHROCYTE [DISTWIDTH] IN BLOOD BY AUTOMATED COUNT: 13.8 % (ref 11.5–14.5)
ETHANOL SERPL-MCNC: <10 MG/DL
FENTANYL+NORFENTANYL UR QL SCN: ABNORMAL
GLUCOSE SERPL-MCNC: 129 MG/DL (ref 74–99)
HCT VFR BLD AUTO: 45.9 % (ref 41–52)
HGB BLD-MCNC: 15.2 G/DL (ref 13.5–17.5)
IMM GRANULOCYTES # BLD AUTO: 0.01 X10*3/UL (ref 0–0.7)
IMM GRANULOCYTES NFR BLD AUTO: 0.2 % (ref 0–0.9)
LYMPHOCYTES # BLD MANUAL: 1.66 X10*3/UL (ref 1.2–4.8)
LYMPHOCYTES NFR BLD MANUAL: 26.8 %
MCH RBC QN AUTO: 27.2 PG (ref 26–34)
MCHC RBC AUTO-ENTMCNC: 33.1 G/DL (ref 32–36)
MCV RBC AUTO: 82 FL (ref 80–100)
METAMYELOCYTES # BLD MANUAL: 0 X10*3/UL
METAMYELOCYTES NFR BLD MANUAL: 0 %
METHADONE UR QL SCN: ABNORMAL
MONOCYTES # BLD MANUAL: 0.76 X10*3/UL (ref 0.1–1)
MONOCYTES NFR BLD MANUAL: 12.2 %
MYELOCYTES # BLD MANUAL: 0 X10*3/UL
MYELOCYTES NFR BLD MANUAL: 0 %
NEUTROPHILS # BLD MANUAL: 3.43 X10*3/UL (ref 1.2–7.7)
NEUTS BAND # BLD MANUAL: 0 X10*3/UL (ref 0–0.7)
NEUTS BAND NFR BLD MANUAL: 0 %
NEUTS SEG # BLD MANUAL: 3.43 X10*3/UL (ref 1.2–7)
NEUTS SEG NFR BLD MANUAL: 55.3 %
NRBC BLD MANUAL-RTO: 0 % (ref 0–0)
NRBC BLD-RTO: 0 /100 WBCS (ref 0–0)
OPIATES UR QL SCN: ABNORMAL
OXYCODONE+OXYMORPHONE UR QL SCN: ABNORMAL
P AXIS: 49 DEGREES
P OFFSET: 206 MS
P ONSET: 148 MS
PCP UR QL SCN: ABNORMAL
PLASMA CELLS # BLD MANUAL: 0 X10*3/UL
PLASMA CELLS NFR BLD MANUAL: 0 %
PLATELET # BLD AUTO: 272 X10*3/UL (ref 150–450)
POTASSIUM SERPL-SCNC: 4 MMOL/L (ref 3.5–5.3)
PR INTERVAL: 144 MS
PROMYELOCYTES # BLD MANUAL: 0 X10*3/UL
PROMYELOCYTES NFR BLD MANUAL: 0 %
PROT SERPL-MCNC: 7.3 G/DL (ref 6.4–8.2)
Q ONSET: 220 MS
QRS COUNT: 14 BEATS
QRS DURATION: 86 MS
QT INTERVAL: 390 MS
QTC CALCULATION(BAZETT): 458 MS
QTC FREDERICIA: 434 MS
R AXIS: 67 DEGREES
RBC # BLD AUTO: 5.59 X10*6/UL (ref 4.5–5.9)
RBC MORPH BLD: NORMAL
SALICYLATES SERPL-MCNC: <3 MG/DL (ref ?–20)
SODIUM SERPL-SCNC: 137 MMOL/L (ref 136–145)
T AXIS: 52 DEGREES
T OFFSET: 415 MS
TOTAL CELLS COUNTED BLD: 123
VARIANT LYMPHS # BLD MANUAL: 0.25 X10*3/UL (ref 0–0.5)
VARIANT LYMPHS NFR BLD: 4.1 %
VENTRICULAR RATE: 83 BPM
WBC # BLD AUTO: 6.2 X10*3/UL (ref 4.4–11.3)

## 2025-05-06 PROCEDURE — 80053 COMPREHEN METABOLIC PANEL: CPT

## 2025-05-06 PROCEDURE — 36415 COLL VENOUS BLD VENIPUNCTURE: CPT

## 2025-05-06 PROCEDURE — 2500000004 HC RX 250 GENERAL PHARMACY W/ HCPCS (ALT 636 FOR OP/ED): Mod: JZ | Performed by: EMERGENCY MEDICINE

## 2025-05-06 PROCEDURE — 80320 DRUG SCREEN QUANTALCOHOLS: CPT

## 2025-05-06 PROCEDURE — 93005 ELECTROCARDIOGRAM TRACING: CPT

## 2025-05-06 PROCEDURE — 80307 DRUG TEST PRSMV CHEM ANLYZR: CPT

## 2025-05-06 PROCEDURE — 99215 OFFICE O/P EST HI 40 MIN: CPT | Performed by: STUDENT IN AN ORGANIZED HEALTH CARE EDUCATION/TRAINING PROGRAM

## 2025-05-06 PROCEDURE — 85007 BL SMEAR W/DIFF WBC COUNT: CPT

## 2025-05-06 PROCEDURE — 99285 EMERGENCY DEPT VISIT HI MDM: CPT | Performed by: EMERGENCY MEDICINE

## 2025-05-06 PROCEDURE — 96372 THER/PROPH/DIAG INJ SC/IM: CPT | Performed by: EMERGENCY MEDICINE

## 2025-05-06 PROCEDURE — 85027 COMPLETE CBC AUTOMATED: CPT

## 2025-05-06 RX ORDER — PALIPERIDONE PALMITATE 234 MG/1.5ML
234 INJECTION INTRAMUSCULAR
COMMUNITY

## 2025-05-06 RX ORDER — BENZONATATE 100 MG/1
100 CAPSULE ORAL EVERY 8 HOURS PRN
COMMUNITY
Start: 2025-05-03 | End: 2025-05-10

## 2025-05-06 RX ORDER — DOCUSATE SODIUM 100 MG/1
1 CAPSULE, LIQUID FILLED ORAL
COMMUNITY
Start: 2025-02-24

## 2025-05-06 RX ORDER — HALOPERIDOL LACTATE 5 MG/ML
INJECTION, SOLUTION INTRAMUSCULAR
Status: COMPLETED
Start: 2025-05-06 | End: 2025-05-06

## 2025-05-06 RX ORDER — IBUPROFEN 200 MG
1 TABLET ORAL EVERY 24 HOURS
COMMUNITY
Start: 2025-04-10

## 2025-05-06 RX ORDER — MIDAZOLAM HYDROCHLORIDE 5 MG/ML
INJECTION, SOLUTION INTRAMUSCULAR; INTRAVENOUS
Status: COMPLETED
Start: 2025-05-06 | End: 2025-05-06

## 2025-05-06 RX ORDER — MIDAZOLAM HYDROCHLORIDE 5 MG/ML
5 INJECTION, SOLUTION INTRAMUSCULAR; INTRAVENOUS ONCE
Status: COMPLETED | OUTPATIENT
Start: 2025-05-06 | End: 2025-05-06

## 2025-05-06 RX ORDER — HALOPERIDOL LACTATE 5 MG/ML
5 INJECTION, SOLUTION INTRAMUSCULAR ONCE
Status: COMPLETED | OUTPATIENT
Start: 2025-05-06 | End: 2025-05-06

## 2025-05-06 RX ORDER — HALOPERIDOL LACTATE 5 MG/ML
5 INJECTION, SOLUTION INTRAMUSCULAR EVERY 4 HOURS PRN
Status: DISCONTINUED | OUTPATIENT
Start: 2025-05-06 | End: 2025-05-07 | Stop reason: HOSPADM

## 2025-05-06 RX ADMIN — MIDAZOLAM HYDROCHLORIDE 5 MG: 5 INJECTION, SOLUTION INTRAMUSCULAR; INTRAVENOUS at 01:50

## 2025-05-06 RX ADMIN — HALOPERIDOL LACTATE 5 MG: 5 INJECTION, SOLUTION INTRAMUSCULAR at 01:50

## 2025-05-06 ASSESSMENT — PAIN SCALES - GENERAL: PAINLEVEL_OUTOF10: 0 - NO PAIN
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
## 2025-05-06 NOTE — PROGRESS NOTES
Observation History and Physical  [facility] EMERGENCY MEDICINE           History of Present Illness     History provided by: Patient and EMS  Limitations to History: None  External Records Reviewed: I reviewed prior ED visits, Care Everywhere, discharge summaries and outpatient records as appropriate.         Patient History:  Anibal Caro is a 44 y.o. male who presented to the emergency department with concern for aggressive behavior.  Patient has extensive history of poorly controlled and poorly treated schizophrenia    Anibal Caro was escalated to observation status. Observation was necessary as they continue to require treatment and monitoring of their psychiatric illness while awaiting inpatient behavioral health bed availability.    Physical Exam     Visit Vitals  /73 (BP Location: Left arm, Patient Position: Sitting)   Pulse 90   Temp 36.9 °C (98.4 °F) (Oral)   Resp 20   SpO2 94%   Smoking Status Never       GENERAL:  The patient appears nourished and normally developed. Vital signs as documented.     PULMONARY:  Without any respiratory distress. Able to speak full sentences, no accessory muscle use    CARDIAC: Warm and well perfused. No cyanosis.    MUSCULOSKELETAL:   Able to ambulate, Non edematous, with no obvious deformities.     SKIN: No pallor. Intact.    NEURO:  No obvious neurological deficits.  Able to follow commands.    Psych: Calm and cooperative, internally stimulated frequently interacting with auditory hallucinations      Impression and Plan     Diagnoses as of 05/06/25 1857   Aggressive behavior        Anibal Caro under observation status in [facility] EMERGENCY MEDICINE for psychiatric illness monitoring and treatment while awaiting inpatient behavioral health bed availability.   Emergency Psychiatric Assessment Team has been consulted. Case discussed with them and decision for inpatient hospitalization deemed necessary. Patient is medically clear  at this time.     Home medication reconciliation was reviewed and restarted where clinically indicated.     Patient and Family updated on plan of care.     Alba Noe DO

## 2025-05-06 NOTE — SIGNIFICANT EVENT
Application for Emergency Admission      Ready for Transfer?  Is the patient medically cleared for transfer to inpatient psychiatry: Yes  Has the patient been accepted to an inpatient psychiatric hospital: Yes    Application for Emergency Admission  IN ACCORDANCE [place]  The Chief Clinical Officer of: [facility] 5/7/25     Reason for Hospitalization  The undersigned has reason to believe that: Anibal Caro Is a mentally ill person subject to hospitalization by court order under division B Section 5122.01 of the Revised Code, i.e., this person:    1.Yes  Represents a substantial risk of physical harm to self as manifested by evidence of threats of, or attempts at, suicide or serious self-inflicted bodily harm    2.Yes Represents a substantial risk of physical harm to others as manifested by evidence of recent homicidal or other violent behavior, evidence of recent threats that place another in reasonable fear of violent behavior and serious physical harm, or other evidence of present dangerousness    3.Yes Represents a substantial and immediate risk of serious physical impairment or injury to self as manifested by  evidence that the person is unable to provide for and is not providing for the person's basic physical needs because of the person's mental illness and that appropriate provision for those needs cannot be made  immediately available in the community    4.Yes Would benefit from treatment in a hospital for his mental illness and is in need of such treatment as manifested by evidence of behavior that creates a grave and imminent risk to substantial rights of others or  himself.    5.Yes Would benefit from treatment as manifested by evidence of behavior that indicates all of the following:       (a) The person is unlikely to survive safely in the community without supervision, based on a clinical determination.       (b) The person has a history of lack of compliance with treatment for  mental illness and one of the following applies:      (i) At least twice within the thirty-six months prior to the filing of an affidavit seeking court-ordered treatment of the person under section 5122.111 of the Revised Code, the lack of compliance has been a significant factor in necessitating hospitalization in a hospital or receipt of services in a forensic or other mental health unit of a correctional facility, provided that the thirty-six-month period shall be extended by the length of any hospitalization or incarceration of the person that occurred within the thirty-six-month period.      (ii) Within the forty-eight months prior to the filing of an affidavit seeking court-ordered treatment of the person under section 5122.111 of the Revised Code, the lack of compliance resulted in one or more acts of serious violent behavior toward self or others or threats of, or attempts at, serious physical harm to self or others, provided that the forty-eight-month period shall be extended by the length of any hospitalization or incarceration of the person that occurred within the forty-eight-month period.      (c) The person, as a result of mental illness, is unlikely to voluntarily participate in necessary treatment.       (d) In view of the person's treatment history and current behavior, the person is in need of treatment in order to prevent a relapse or deterioration that would be likely to result in substantial risk of serious harm to the person or others.    (e) Represents a substantial risk of physical harm to self or others if allowed to remain at liberty pending examination.    Therefore, it is requested that said person be admitted to the above named facility.    STATEMENT OF BELIEF    Must be filled out by one of the following: a psychiatrist, licensed physician, licensed clinical psychologist, health or ,  or .  (Statement shall include the circumstances under which the  individual was taken into custody and the reason for the person's belief that hospitalization is necessary. The statement shall also include a reference to efforts made to secure the individual's property at his residence if he was taken into custody there. Every reasonable and appropriate effort should be made to take this person into custody in the least conspicuous manner possible.)    Patient initially presented with concern for aggressive behavior in the setting of acute decompensated schizophrenia.  Patient initially required sedation but has since been calm, cooperative and following commands.  Has an extensive history of psychiatric illness with poor outpatient social and mental health support.  Patient will benefit from inpatient hospitalization for further management stabilization    Alba Noe DO 5/6/2025     _____________________________________________________________   Place of Employment: [facility]    STATEMENT OF OBSERVATION BY PSYCHIATRIST, LICENSED PHYSICIAN, OR LICENSED CLINICAL PSYCHOLOGIST, IF APPLICABLE    Place of Observation (e.g., [facility], general hospital, office, emergency facility)  (If applicable, please complete)    Alba Noe DO 5/6/2025    _____________________________________________________________

## 2025-05-07 VITALS
HEART RATE: 60 BPM | OXYGEN SATURATION: 99 % | RESPIRATION RATE: 16 BRPM | SYSTOLIC BLOOD PRESSURE: 120 MMHG | TEMPERATURE: 98.1 F | DIASTOLIC BLOOD PRESSURE: 72 MMHG

## 2025-05-07 SDOH — HEALTH STABILITY: MENTAL HEALTH: BEHAVIORS/MOOD: IMPULSIVE;INAPPROPRIATE

## 2025-05-07 SDOH — HEALTH STABILITY: MENTAL HEALTH: BEHAVIORAL HEALTH(WDL): EXCEPTIONS TO WDL

## 2025-05-07 ASSESSMENT — PAIN - FUNCTIONAL ASSESSMENT: PAIN_FUNCTIONAL_ASSESSMENT: 0-10

## 2025-05-07 ASSESSMENT — PAIN SCALES - GENERAL: PAINLEVEL_OUTOF10: 0 - NO PAIN

## 2025-05-07 NOTE — PROGRESS NOTES
Patient was handed off to me from the previous team. For full history, physical, and prior ED course, please see previous provider note prior to patient handoff. This is an addendum to the record.    This is a 44 year old male who was a sign out to me pending confirmation of [facility] acceptance.   Per EPAT he was accepted and I updated both pink slip and the EMTALA form. He is currently awaiting transportation to [facility].     Hospital Course/MDM:  See the original ED provider note     Disposition:  Discharge     Adore Medina CNP  Emergency Medicine

## 2025-05-09 NOTE — DISCHARGE SUMMARY
Observation Disposition Note  [facility] EMERGENCY MEDICINE             Subjective:       Anibal Caro has been under observation status in [facility] EMERGENCY MEDICINE for psychiatric illness monitoring and treatment while awaiting inpatient behavioral health bed availability.       Physical Exam     Visit Vitals  /72 (BP Location: Right arm, Patient Position: Lying)   Pulse 60   Temp 36.7 °C (98.1 °F)   Resp 16   SpO2 99%   Smoking Status Never       GENERAL:  The patient appears nourished and normally developed. Vital signs as documented.     PULMONARY:  Without any respiratory distress. Able to speak full sentences, no accessory muscle use    CARDIAC: Warm and well perfused. No cyanosis.    MUSCULOSKELETAL:   Able to ambulate, Non edematous, with no obvious deformities.     SKIN: No pallor. Intact.    NEURO:  No obvious neurological deficits.  Able to follow commands.    Psych: Calm and cooperative.      Impresssion and Plan     Diagnoses as of 05/09/25 1744   Aggressive behavior       In summary, Anibal Caro has been cared under observation in [facility] for psychiatric illness monitoring and treatment while awaiting inpatient behavioral health bed availability.     Emergency Psychiatric Assessment Team was consulted. Case discussed with them and decision for inpatient hospitalization deemed necessary.     Patient has been accepted at [facility]    Total length of observation was 35 hours. Dr. Plascencia is the disposition attending.    Discharge Diagnosis  Acute psychosis    Alex Malloy DO

## 2025-06-03 ENCOUNTER — HOSPITAL ENCOUNTER (EMERGENCY)
Facility: HOSPITAL | Age: 45
Discharge: HOME | End: 2025-06-03
Attending: EMERGENCY MEDICINE
Payer: COMMERCIAL

## 2025-06-03 ENCOUNTER — APPOINTMENT (OUTPATIENT)
Dept: RADIOLOGY | Facility: HOSPITAL | Age: 45
End: 2025-06-03
Payer: COMMERCIAL

## 2025-06-03 VITALS
HEIGHT: 68 IN | RESPIRATION RATE: 18 BRPM | HEART RATE: 96 BPM | DIASTOLIC BLOOD PRESSURE: 92 MMHG | BODY MASS INDEX: 45.47 KG/M2 | SYSTOLIC BLOOD PRESSURE: 141 MMHG | TEMPERATURE: 98.5 F | OXYGEN SATURATION: 95 % | WEIGHT: 300 LBS

## 2025-06-03 DIAGNOSIS — M17.12 OSTEOARTHRITIS OF LEFT KNEE, UNSPECIFIED OSTEOARTHRITIS TYPE: Primary | ICD-10-CM

## 2025-06-03 PROCEDURE — 73564 X-RAY EXAM KNEE 4 OR MORE: CPT | Mod: LT

## 2025-06-03 PROCEDURE — 99283 EMERGENCY DEPT VISIT LOW MDM: CPT | Performed by: EMERGENCY MEDICINE

## 2025-06-03 PROCEDURE — 73564 X-RAY EXAM KNEE 4 OR MORE: CPT | Mod: LEFT SIDE | Performed by: RADIOLOGY

## 2025-06-03 PROCEDURE — 2500000001 HC RX 250 WO HCPCS SELF ADMINISTERED DRUGS (ALT 637 FOR MEDICARE OP)

## 2025-06-03 RX ORDER — IBUPROFEN 600 MG/1
600 TABLET, FILM COATED ORAL ONCE
Status: COMPLETED | OUTPATIENT
Start: 2025-06-03 | End: 2025-06-03

## 2025-06-03 RX ORDER — BENZONATATE 100 MG/1
100 CAPSULE ORAL ONCE
Status: COMPLETED | OUTPATIENT
Start: 2025-06-03 | End: 2025-06-03

## 2025-06-03 RX ADMIN — IBUPROFEN 600 MG: 600 TABLET ORAL at 04:08

## 2025-06-03 RX ADMIN — BENZONATATE 100 MG: 100 CAPSULE ORAL at 04:26

## 2025-06-03 ASSESSMENT — PAIN - FUNCTIONAL ASSESSMENT: PAIN_FUNCTIONAL_ASSESSMENT: 0-10

## 2025-06-03 ASSESSMENT — PAIN DESCRIPTION - ORIENTATION: ORIENTATION: LEFT

## 2025-06-03 ASSESSMENT — PAIN DESCRIPTION - LOCATION: LOCATION: KNEE

## 2025-06-03 ASSESSMENT — PAIN DESCRIPTION - PAIN TYPE: TYPE: ACUTE PAIN

## 2025-06-03 ASSESSMENT — PAIN SCALES - GENERAL: PAINLEVEL_OUTOF10: 4

## 2025-06-03 NOTE — PROGRESS NOTES
Anibal Caro is a 44 y.o. male on day 0 of admission presenting with No Principal Problem: There is no principal problem currently on the Problem List. Please update the Problem List and refresh..  Assessment & Plan    SW following for discharge planning. SW rounded in ED lobby and observed Pt not present. Spoke with RN prior to this. Spoke with MD and RN afterwards to ask Pt be discharged from the board as Pt no longer here.     GALI Stringer

## 2025-06-03 NOTE — DISCHARGE INSTRUCTIONS
Your left knee has evidence of significant osteoarthritis but no evidence of acute traumatic injury.  We would recommend follow-up with orthopedics for discussion of treatment options which can range from injection in the knee to knee replacement.  We would recommend at home management with ibuprofen 600 mg every 6 hours or topical diclofenac gel.

## 2025-06-03 NOTE — ED PROVIDER NOTES
HPI   Chief Complaint   Patient presents with    Knee Pain       Patient is a 44-year-old male with past medical history of paranoid schizophrenia, bipolar disorder presenting with concern for popping sensation in the left knee.  Patient reports he believes his knee is dislocating relocating.  Points to patella when asked about symptoms.  Patient concerned about a ligamentous injury but does not endorse any recent trauma or injuries.  Does endorse some mild pain associated with it.  Reports he was seen at [facility] and discharged but does not report there obtained x-rays.            Patient History   Medical History[1]  Surgical History[2]  Family History[3]  Social History[4]    Physical Exam   ED Triage Vitals [06/03/25 0348]   Temperature Heart Rate Respirations BP   36.9 °C (98.5 °F) 96 18 (!) 141/92      Pulse Ox Temp Source Heart Rate Source Patient Position   95 % Oral Monitor Sitting      BP Location FiO2 (%)     Left arm --       Physical Exam  Constitutional:       Appearance: Normal appearance.   HENT:      Head: Normocephalic and atraumatic.   Eyes:      Extraocular Movements: Extraocular movements intact.   Cardiovascular:      Rate and Rhythm: Normal rate.   Pulmonary:      Effort: Pulmonary effort is normal.   Musculoskeletal:      Cervical back: Normal range of motion.      Comments: Mild tenderness over anterior left patella.  No significant laxity with anterior posterior drawer test.  No significant laxity with varus or valgus strain testing   Skin:     General: Skin is warm and dry.   Neurological:      General: No focal deficit present.      Mental Status: He is alert and oriented to person, place, and time.   Psychiatric:         Mood and Affect: Mood normal.         Behavior: Behavior normal.           ED Course & MDM   Diagnoses as of 06/03/25 0511   Osteoarthritis of left knee, unspecified osteoarthritis type                 No data recorded     Julius Coma Scale Score: 15 (06/03/25 0350 : Arvin  DULCE Villalta)                           Medical Decision Making  Patient is a 44-year-old male with past medical history of paranoid schizophrenia, bipolar disorder presenting with concern for popping sensation in the left knee.  Physical exam generally reassuring with no evidence of dislocation or no joint laxity.  X-rays obtained with no evidence of acute traumatic injury.  Did show signs of osteoarthritis which may be contributing to patient's symptoms.  Advised on follow-up with orthopedic surgery, at home pain medications as needed.  Social work consulted for patient's housing issues at his request.  Return precautions, appropriate follow-up discussed with patient and patient discharged home.    Patient seen and discussed with Dr. Gary Arnold MD, PhD  Emergency Medicine PGY3          Procedure  Procedures       [1]   Past Medical History:  Diagnosis Date    Acute (undifferentiated) schizophrenia (Multi)    [2]   Past Surgical History:  Procedure Laterality Date    CT ANGIO AORTA AND BILATERAL ILIOFEMORAL RUN OFF INCLUDING WITHOUT CONTRAST IF PERFORMED  11/3/2022    CT AORTA AND BILATERAL ILIOFEMORAL RUNOFF ANGIOGRAM W AND/OR WO IV CONTRAST [place] [facility] [place]   [3] No family history on file.  [4]   Social History  Tobacco Use    Smoking status: Never    Smokeless tobacco: Never   Vaping Use    Vaping status: Never Used   Substance Use Topics    Alcohol use: Never    Drug use: Not on file        Alistair Arnold MD  Resident  06/03/25 9374

## 2025-06-03 NOTE — ED TRIAGE NOTES
PT presents to ED via EMS for chief complaint of left knee pain. PT states his knee keeps dislocating. PT states he felt a pop. PT endorsing 4/10 left knee pain. Per EMS PT was able to ambulate to and from the stretcher. PT is [employer].

## 2025-06-07 ENCOUNTER — HOSPITAL ENCOUNTER (EMERGENCY)
Facility: HOSPITAL | Age: 45
Discharge: HOME | End: 2025-06-07
Payer: COMMERCIAL

## 2025-06-07 ENCOUNTER — HOSPITAL ENCOUNTER (EMERGENCY)
Facility: HOSPITAL | Age: 45
Discharge: LEFT BEFORE DISCHARGE | End: 2025-06-07
Attending: EMERGENCY MEDICINE
Payer: COMMERCIAL

## 2025-06-07 VITALS
SYSTOLIC BLOOD PRESSURE: 123 MMHG | OXYGEN SATURATION: 93 % | BODY MASS INDEX: 44.3 KG/M2 | HEIGHT: 69 IN | HEART RATE: 85 BPM | DIASTOLIC BLOOD PRESSURE: 93 MMHG | TEMPERATURE: 98 F | RESPIRATION RATE: 18 BRPM

## 2025-06-07 VITALS
DIASTOLIC BLOOD PRESSURE: 93 MMHG | RESPIRATION RATE: 15 BRPM | HEART RATE: 100 BPM | TEMPERATURE: 98.2 F | SYSTOLIC BLOOD PRESSURE: 144 MMHG | OXYGEN SATURATION: 95 %

## 2025-06-07 DIAGNOSIS — Z59.00 HOMELESS: Primary | ICD-10-CM

## 2025-06-07 PROCEDURE — 99281 EMR DPT VST MAYX REQ PHY/QHP: CPT

## 2025-06-07 PROCEDURE — 99283 EMERGENCY DEPT VISIT LOW MDM: CPT

## 2025-06-07 PROCEDURE — 99283 EMERGENCY DEPT VISIT LOW MDM: CPT | Performed by: EMERGENCY MEDICINE

## 2025-06-07 SDOH — ECONOMIC STABILITY - HOUSING INSECURITY: HOMELESSNESS UNSPECIFIED: Z59.00

## 2025-06-07 ASSESSMENT — COLUMBIA-SUICIDE SEVERITY RATING SCALE - C-SSRS
2. HAVE YOU ACTUALLY HAD ANY THOUGHTS OF KILLING YOURSELF?: NO
1. IN THE PAST MONTH, HAVE YOU WISHED YOU WERE DEAD OR WISHED YOU COULD GO TO SLEEP AND NOT WAKE UP?: NO
6. HAVE YOU EVER DONE ANYTHING, STARTED TO DO ANYTHING, OR PREPARED TO DO ANYTHING TO END YOUR LIFE?: NO
6. HAVE YOU EVER DONE ANYTHING, STARTED TO DO ANYTHING, OR PREPARED TO DO ANYTHING TO END YOUR LIFE?: NO
2. HAVE YOU ACTUALLY HAD ANY THOUGHTS OF KILLING YOURSELF?: NO
1. IN THE PAST MONTH, HAVE YOU WISHED YOU WERE DEAD OR WISHED YOU COULD GO TO SLEEP AND NOT WAKE UP?: NO

## 2025-06-07 ASSESSMENT — PAIN SCALES - GENERAL
PAINLEVEL_OUTOF10: 5 - MODERATE PAIN
PAINLEVEL_OUTOF10: 5 - MODERATE PAIN
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
## 2025-06-08 NOTE — ED TRIAGE NOTES
Patient came to ED with c/o leg cramp in right leg starting today and request to see social work about emergency housing. No additional complaints, pt was discharged earlier today.
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
## 2025-06-08 NOTE — DISCHARGE INSTRUCTIONS
Social work is not here tonight. You are able to return in the morning to speak with them about housing.

## 2025-06-09 ENCOUNTER — HOSPITAL ENCOUNTER (EMERGENCY)
Facility: HOSPITAL | Age: 45
Discharge: HOME | End: 2025-06-10
Payer: COMMERCIAL

## 2025-06-09 VITALS
HEIGHT: 69 IN | HEART RATE: 97 BPM | WEIGHT: 300 LBS | BODY MASS INDEX: 44.43 KG/M2 | TEMPERATURE: 98.2 F | RESPIRATION RATE: 18 BRPM | DIASTOLIC BLOOD PRESSURE: 81 MMHG | OXYGEN SATURATION: 97 % | SYSTOLIC BLOOD PRESSURE: 121 MMHG

## 2025-06-09 DIAGNOSIS — R10.11 RIGHT UPPER QUADRANT ABDOMINAL PAIN: Primary | ICD-10-CM

## 2025-06-09 PROCEDURE — 99282 EMERGENCY DEPT VISIT SF MDM: CPT

## 2025-06-09 PROCEDURE — 99283 EMERGENCY DEPT VISIT LOW MDM: CPT

## 2025-06-09 ASSESSMENT — PAIN DESCRIPTION - LOCATION
LOCATION_2: LEG
LOCATION: ABDOMEN

## 2025-06-09 ASSESSMENT — PAIN - FUNCTIONAL ASSESSMENT: PAIN_FUNCTIONAL_ASSESSMENT: 0-10

## 2025-06-09 ASSESSMENT — PAIN DESCRIPTION - ORIENTATION: ORIENTATION_2: LEFT

## 2025-06-09 ASSESSMENT — PAIN SCALES - GENERAL
PAINLEVEL_OUTOF10: 7
PAINLEVEL_OUTOF10: 7

## 2025-06-09 ASSESSMENT — PAIN DESCRIPTION - DESCRIPTORS
DESCRIPTORS: ACHING
DESCRIPTORS_2: SHARP;ACHING

## 2025-06-09 ASSESSMENT — PAIN DESCRIPTION - FREQUENCY: FREQUENCY: CONSTANT/CONTINUOUS

## 2025-06-09 ASSESSMENT — PAIN DESCRIPTION - PAIN TYPE: TYPE: ACUTE PAIN;CHRONIC PAIN

## 2025-06-10 PROCEDURE — 2500000001 HC RX 250 WO HCPCS SELF ADMINISTERED DRUGS (ALT 637 FOR MEDICARE OP)

## 2025-06-10 RX ORDER — ACETAMINOPHEN 325 MG/1
650 TABLET ORAL ONCE
Status: COMPLETED | OUTPATIENT
Start: 2025-06-10 | End: 2025-06-10

## 2025-06-10 RX ADMIN — ACETAMINOPHEN 650 MG: 325 TABLET ORAL at 00:44

## 2025-06-10 NOTE — ED TRIAGE NOTES
Patient brought in via [facility] to triage for left leg pain and abdominal pain x1 month. Denies any injury/trauma. Ambulatory in triage.

## 2025-06-10 NOTE — ED PROVIDER NOTES
HPI   Chief Complaint   Patient presents with    Leg Pain    Abdominal Pain       HPI    Anibal Caor is a 44-year-old male with history of homelessness, paranoid schizophrenia, bipolar disorder presenting the ED with right-sided abdominal pain that began today.  Patient states the pain has improved while he was waiting in the waiting room today.  Patient denies nausea, vomiting.  Patient denies chest pain, shortness of breath, fevers, body aches, chills, swelling to bilateral legs.    Patient History   Medical History[1]  Surgical History[2]  Family History[3]  Social History[4]    Physical Exam   ED Triage Vitals [06/09/25 2029]   Temperature Heart Rate Respirations BP   36.8 °C (98.2 °F) 97 18 121/81      Pulse Ox Temp Source Heart Rate Source Patient Position   97 % Temporal Monitor Sitting      BP Location FiO2 (%)     Left arm --       Physical Exam  Vitals and nursing note reviewed.   Constitutional:       Appearance: Normal appearance. He is obese.   Cardiovascular:      Rate and Rhythm: Normal rate and regular rhythm.      Heart sounds: Normal heart sounds. No murmur heard.     No gallop.   Pulmonary:      Effort: Pulmonary effort is normal. No respiratory distress.      Breath sounds: Normal breath sounds. No stridor. No wheezing, rhonchi or rales.   Abdominal:      General: Abdomen is flat. Bowel sounds are normal. There is no distension.      Palpations: Abdomen is soft. There is no mass.      Tenderness: There is no abdominal tenderness. There is no guarding or rebound.      Hernia: No hernia is present.   Musculoskeletal:      Right lower leg: No edema.      Left lower leg: No edema.   Skin:     General: Skin is warm and dry.   Neurological:      General: No focal deficit present.      Mental Status: He is alert and oriented to person, place, and time.   Psychiatric:         Mood and Affect: Mood normal.         Behavior: Behavior normal.           ED Course & MDM   Diagnoses as of 06/10/25 0132    Right upper quadrant abdominal pain                 No data recorded     [place] Coma Scale Score: 15 (06/09/25 2032 : Jaimie Hwang RN)                           Medical Decision Making    This is a 44-year-old male presenting the ED with right-sided abdominal pain that began today.  On exam abdomen is soft and nontender in all 4 quadrant without rebound tenderness and guarding.  There is low suspicion for acute intra-abdominal abnormalities at this time therefore imaging not obtained today.  Low suspicion for acute appendicitis.  It is reassuring that abdominal pain did improve while the patient was in the waiting room.  Given patient denies vomiting there is low suspicion for electrolyte abnormalities and dehydration therefore labs were not obtained.  Patient was given Tylenol for pain relief.  Patient requested a turkey sandwich and ginger ale and those were provided to him.  On reevaluation patient states his pain improved after he ate and after the Tylenol was given.  Patient tolerated p.o. challenge without vomiting.  Patient has been hemodynamically stable in the emergency department today.    Disposition: Discharge home  Patient advised to follow-up with his primary care provider.  Patient vies take Tylenol at home for pain relief.  Strict return precautions were given.  Plan was discussed with the patient patient understands and agrees.  Patient was discharged in stable condition.  Procedure  Procedures         [1]   Past Medical History:  Diagnosis Date    Acute (undifferentiated) schizophrenia (Multi)    [2]   Past Surgical History:  Procedure Laterality Date    CT ANGIO AORTA AND BILATERAL ILIOFEMORAL RUN OFF INCLUDING WITHOUT CONTRAST IF PERFORMED  11/3/2022    CT AORTA AND BILATERAL ILIOFEMORAL RUNOFF ANGIOGRAM W AND/OR WO IV CONTRAST 11[place]3/2022 [facility] [place]   [3] No family history on file.  [4]   Social History  Tobacco Use    Smoking status: Never    Smokeless tobacco: Never   Vaping Use     Vaping status: Never Used   Substance Use Topics    Alcohol use: Never    Drug use: Not on file        Anderson Alexandra PA-C  06/10/25 0133

## 2025-06-10 NOTE — DISCHARGE INSTRUCTIONS
Please follow-up with your primary care provider for further evaluation management of your symptoms.  Take Tylenol at home for pain relief.  Return to the emergency department if symptoms persist or worsen or any new symptoms began.

## 2025-06-11 ENCOUNTER — HOSPITAL ENCOUNTER (EMERGENCY)
Facility: HOSPITAL | Age: 45
Discharge: LEFT BEFORE DISCHARGE | End: 2025-06-11
Payer: COMMERCIAL

## 2025-06-11 PROCEDURE — 4500999001 HC ED NO CHARGE

## 2025-06-11 PROCEDURE — 99283 EMERGENCY DEPT VISIT LOW MDM: CPT

## 2025-06-12 ENCOUNTER — HOSPITAL ENCOUNTER (EMERGENCY)
Facility: HOSPITAL | Age: 45
Discharge: LEFT BEFORE DISCHARGE | End: 2025-06-12
Payer: COMMERCIAL

## 2025-06-12 ENCOUNTER — HOSPITAL ENCOUNTER (EMERGENCY)
Facility: HOSPITAL | Age: 45
Discharge: HOME | End: 2025-06-13
Payer: COMMERCIAL

## 2025-06-12 PROCEDURE — 99283 EMERGENCY DEPT VISIT LOW MDM: CPT

## 2025-06-12 PROCEDURE — 4500999001 HC ED NO CHARGE
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
## 2025-06-15 ENCOUNTER — HOSPITAL ENCOUNTER (EMERGENCY)
Facility: HOSPITAL | Age: 45
Discharge: LEFT BEFORE DISCHARGE | End: 2025-06-15
Payer: COMMERCIAL

## 2025-06-15 VITALS
HEART RATE: 61 BPM | SYSTOLIC BLOOD PRESSURE: 123 MMHG | TEMPERATURE: 97.2 F | DIASTOLIC BLOOD PRESSURE: 75 MMHG | RESPIRATION RATE: 16 BRPM | OXYGEN SATURATION: 98 %

## 2025-06-15 PROCEDURE — 4500999001 HC ED NO CHARGE

## 2025-06-15 PROCEDURE — 99283 EMERGENCY DEPT VISIT LOW MDM: CPT

## 2025-06-18 ENCOUNTER — HOSPITAL ENCOUNTER (EMERGENCY)
Facility: HOSPITAL | Age: 45
Discharge: LEFT BEFORE DISCHARGE | End: 2025-06-18
Payer: COMMERCIAL

## 2025-06-18 VITALS
TEMPERATURE: 97.5 F | DIASTOLIC BLOOD PRESSURE: 88 MMHG | HEIGHT: 69 IN | SYSTOLIC BLOOD PRESSURE: 130 MMHG | RESPIRATION RATE: 16 BRPM | WEIGHT: 300 LBS | OXYGEN SATURATION: 94 % | BODY MASS INDEX: 44.43 KG/M2 | HEART RATE: 87 BPM

## 2025-06-18 PROCEDURE — 99283 EMERGENCY DEPT VISIT LOW MDM: CPT

## 2025-06-18 PROCEDURE — 4500999001 HC ED NO CHARGE

## 2025-06-18 ASSESSMENT — PAIN DESCRIPTION - DESCRIPTORS: DESCRIPTORS: DISCOMFORT

## 2025-06-18 ASSESSMENT — PAIN DESCRIPTION - ORIENTATION: ORIENTATION: LEFT

## 2025-06-18 ASSESSMENT — PAIN - FUNCTIONAL ASSESSMENT: PAIN_FUNCTIONAL_ASSESSMENT: 0-10

## 2025-06-18 ASSESSMENT — PAIN DESCRIPTION - LOCATION: LOCATION: LEG

## 2025-06-18 ASSESSMENT — PAIN SCALES - GENERAL: PAINLEVEL_OUTOF10: 8

## 2025-06-18 ASSESSMENT — PAIN DESCRIPTION - PAIN TYPE: TYPE: ACUTE PAIN

## 2025-06-19 ENCOUNTER — HOSPITAL ENCOUNTER (EMERGENCY)
Facility: HOSPITAL | Age: 45
Discharge: HOME | End: 2025-06-20
Payer: COMMERCIAL

## 2025-06-19 VITALS
HEART RATE: 88 BPM | SYSTOLIC BLOOD PRESSURE: 170 MMHG | RESPIRATION RATE: 16 BRPM | TEMPERATURE: 98 F | DIASTOLIC BLOOD PRESSURE: 100 MMHG | OXYGEN SATURATION: 99 %

## 2025-06-19 DIAGNOSIS — S99.912A INJURY OF LEFT ANKLE, INITIAL ENCOUNTER: Primary | ICD-10-CM

## 2025-06-19 PROCEDURE — 99283 EMERGENCY DEPT VISIT LOW MDM: CPT

## 2025-06-19 ASSESSMENT — PAIN DESCRIPTION - PAIN TYPE: TYPE: ACUTE PAIN

## 2025-06-19 ASSESSMENT — PAIN SCALES - GENERAL: PAINLEVEL_OUTOF10: 1

## 2025-06-19 ASSESSMENT — PAIN - FUNCTIONAL ASSESSMENT: PAIN_FUNCTIONAL_ASSESSMENT: 0-10

## 2025-06-20 ENCOUNTER — APPOINTMENT (OUTPATIENT)
Dept: RADIOLOGY | Facility: HOSPITAL | Age: 45
End: 2025-06-20
Payer: COMMERCIAL

## 2025-06-20 PROCEDURE — 73610 X-RAY EXAM OF ANKLE: CPT | Mod: LT

## 2025-06-20 PROCEDURE — 2500000001 HC RX 250 WO HCPCS SELF ADMINISTERED DRUGS (ALT 637 FOR MEDICARE OP)

## 2025-06-20 PROCEDURE — 73610 X-RAY EXAM OF ANKLE: CPT | Mod: LEFT SIDE | Performed by: RADIOLOGY

## 2025-06-20 RX ORDER — ACETAMINOPHEN 325 MG/1
650 TABLET ORAL ONCE
Status: COMPLETED | OUTPATIENT
Start: 2025-06-20 | End: 2025-06-20

## 2025-06-20 RX ADMIN — ACETAMINOPHEN 650 MG: 325 TABLET ORAL at 01:22

## 2025-06-20 ASSESSMENT — LIFESTYLE VARIABLES
HAVE PEOPLE ANNOYED YOU BY CRITICIZING YOUR DRINKING: NO
TOTAL SCORE: 0
EVER FELT BAD OR GUILTY ABOUT YOUR DRINKING: NO
EVER HAD A DRINK FIRST THING IN THE MORNING TO STEADY YOUR NERVES TO GET RID OF A HANGOVER: NO
HAVE YOU EVER FELT YOU SHOULD CUT DOWN ON YOUR DRINKING: NO

## 2025-06-23 ENCOUNTER — HOSPITAL ENCOUNTER (EMERGENCY)
Facility: HOSPITAL | Age: 45
Discharge: LEFT BEFORE DISCHARGE | End: 2025-06-23
Payer: COMMERCIAL

## 2025-06-23 PROCEDURE — 99283 EMERGENCY DEPT VISIT LOW MDM: CPT

## 2025-06-23 PROCEDURE — 4500999001 HC ED NO CHARGE

## 2025-06-25 ENCOUNTER — HOSPITAL ENCOUNTER (EMERGENCY)
Facility: HOSPITAL | Age: 45
Discharge: HOME | End: 2025-06-25
Payer: COMMERCIAL

## 2025-06-25 VITALS
HEIGHT: 69 IN | HEART RATE: 90 BPM | TEMPERATURE: 97.7 F | SYSTOLIC BLOOD PRESSURE: 119 MMHG | OXYGEN SATURATION: 99 % | DIASTOLIC BLOOD PRESSURE: 89 MMHG | BODY MASS INDEX: 44.43 KG/M2 | WEIGHT: 300 LBS | RESPIRATION RATE: 18 BRPM

## 2025-06-25 PROCEDURE — 4500999001 HC ED NO CHARGE

## 2025-06-25 PROCEDURE — 99283 EMERGENCY DEPT VISIT LOW MDM: CPT

## 2025-06-25 ASSESSMENT — PAIN DESCRIPTION - ORIENTATION: ORIENTATION: RIGHT;LEFT

## 2025-06-25 ASSESSMENT — PAIN - FUNCTIONAL ASSESSMENT: PAIN_FUNCTIONAL_ASSESSMENT: 0-10

## 2025-06-25 ASSESSMENT — PAIN DESCRIPTION - LOCATION: LOCATION: FOOT

## 2025-06-25 ASSESSMENT — PAIN SCALES - GENERAL: PAINLEVEL_OUTOF10: 4

## 2025-06-25 ASSESSMENT — PAIN DESCRIPTION - PAIN TYPE: TYPE: ACUTE PAIN

## 2025-06-25 NOTE — ED TRIAGE NOTES
Patient presents to the Emergency department with a chief complaint of bilateral food pain, swelling. Patient states he stepped on some glass which he believes to be the cause of his pain. Patient is unclear of when he did this.

## 2025-06-30 VITALS
HEIGHT: 68 IN | RESPIRATION RATE: 18 BRPM | BODY MASS INDEX: 45.47 KG/M2 | TEMPERATURE: 98 F | OXYGEN SATURATION: 100 % | DIASTOLIC BLOOD PRESSURE: 88 MMHG | HEART RATE: 78 BPM | WEIGHT: 300 LBS | SYSTOLIC BLOOD PRESSURE: 139 MMHG

## 2025-06-30 PROCEDURE — 99283 EMERGENCY DEPT VISIT LOW MDM: CPT

## 2025-07-01 ENCOUNTER — HOSPITAL ENCOUNTER (EMERGENCY)
Facility: HOSPITAL | Age: 45
Discharge: HOME | End: 2025-07-01
Payer: COMMERCIAL

## 2025-07-01 DIAGNOSIS — M79.672 BILATERAL FOOT PAIN: Primary | ICD-10-CM

## 2025-07-01 DIAGNOSIS — M79.671 BILATERAL FOOT PAIN: Primary | ICD-10-CM

## 2025-07-01 DIAGNOSIS — Z76.89 FREQUENT ATTENDER OF ACCIDENT AND EMERGENCY DEPARTMENT: ICD-10-CM

## 2025-07-01 PROCEDURE — 2500000001 HC RX 250 WO HCPCS SELF ADMINISTERED DRUGS (ALT 637 FOR MEDICARE OP)

## 2025-07-01 RX ORDER — ACETAMINOPHEN 325 MG/1
650 TABLET ORAL ONCE
Status: COMPLETED | OUTPATIENT
Start: 2025-07-01 | End: 2025-07-01

## 2025-07-01 RX ADMIN — ACETAMINOPHEN 650 MG: 325 TABLET ORAL at 00:17

## 2025-07-01 NOTE — ED TRIAGE NOTES
Pt presents to the ED for bilateral leg pain and swelling. Pt has been to multiple hospitals daily for the same issue for the past month

## 2025-07-01 NOTE — ED PROVIDER NOTES
HPI   Chief Complaint   Patient presents with    Leg Pain       45-year-old male PMH of homelessness, schizophrenia, polysubstance abuse, DM, alcohol dependence presents emergency department for chief complaints of bilateral lower extremity pain for the past month.  Patient was seen at [facility] less than 24 hours ago for similar complaint.  Patient again states at bedside that he is experiencing lower extremity swelling and states that his feet are infected and is requesting antibiotics.  Denies any recent trauma or falls.  Denies fever or chills              Patient History   Medical History[1]  Surgical History[2]  Family History[3]  Social History[4]    Physical Exam   ED Triage Vitals [06/30/25 2122]   Temperature Heart Rate Respirations BP   36.7 °C (98 °F) 78 18 139/88      Pulse Ox Temp Source Heart Rate Source Patient Position   100 % Temporal Monitor --      BP Location FiO2 (%)     -- --       Physical Exam  Vitals and nursing note reviewed.   Constitutional:       General: He is not in acute distress.     Appearance: Normal appearance. He is normal weight. He is not ill-appearing, toxic-appearing or diaphoretic.   HENT:      Head: Normocephalic and atraumatic.   Cardiovascular:      Rate and Rhythm: Normal rate and regular rhythm.      Heart sounds: Normal heart sounds. No murmur heard.     No friction rub. No gallop.   Pulmonary:      Effort: Pulmonary effort is normal. No respiratory distress.      Breath sounds: Normal breath sounds. No stridor. No wheezing, rhonchi or rales.   Musculoskeletal:      Comments: Bilateral feet without any evidence of obvious trauma.  No significant erythema, edema, lymphatic streaking, skin breakdown, open areas to skin or signs of infectious process.  No pitting edema.  Neurovascular intact distally.  Palpable DP pulses.  Overgrown toenails.   Neurological:      Mental Status: He is alert.           ED Course & MDM   Diagnoses as of 07/01/25 0020   Bilateral foot  pain   Frequent attender of accident and emergency department                 No data recorded     Julius Coma Scale Score: 15 (06/30/25 2123 : Shawn Wolf RN)                           Medical Decision Making  This is this patient's 47th visit to emergency rooms in this area this month alone!    45-year-old male frequently known to this emergency department and surrounding emergency departments presents emergency department today for chief complaints of bilateral foot pain.  Patient on exam resting comfortably, laughing and does not appear in acute distress.  Bilateral feet with no evidence of erythema, edema, open wounds, lymphatic streaking or skin breakdown.  Patient does have overgrown toenails but no obvious infectious process at this time.  No pitting edema noted to the bilateral lower extremities.  Neurovascularly intact distally.  Patient's presentation today likely secondary to frequent ambulation from his homelessness.  No obvious signs of infectious process.  Low concern for osseous involvement at this time given patient's lack of recent falls or injury.  Emergent workup here was considered in the emergency department however, unclear as to what clinical question this would answer at this time.    Patient was given Tylenol here in the emergency department and advised to follow-up with podiatry for routine footcare.  Spoke with patient regarding signs and symptoms of return as well as symptomatic management moving forward.  Patient did have opportunity to ask questions and have them answered and had no further complaints at this time and was amenable to plan before for discharge.        Procedure  Procedures       [1]   Past Medical History:  Diagnosis Date    Acute (undifferentiated) schizophrenia (Multi)    [2]   Past Surgical History:  Procedure Laterality Date    CT ANGIO AORTA AND BILATERAL ILIOFEMORAL RUN OFF INCLUDING WITHOUT CONTRAST IF PERFORMED  11/3/2022    CT AORTA AND BILATERAL  ILIOFEMORAL RUNOFF ANGIOGRAM W AND/OR WO IV CONTRAST [place] [facility] [place] [place]   [3] No family history on file.  [4]   Social History  Tobacco Use    Smoking status: Never    Smokeless tobacco: Never   Vaping Use    Vaping status: Never Used   Substance Use Topics    Alcohol use: Never    Drug use: Not on file        Freddy Gaona PA-C  07/01/25 0235

## 2025-07-02 ENCOUNTER — HOSPITAL ENCOUNTER (EMERGENCY)
Facility: HOSPITAL | Age: 45
Discharge: HOME | End: 2025-07-02
Attending: EMERGENCY MEDICINE
Payer: COMMERCIAL

## 2025-07-02 VITALS
TEMPERATURE: 98.6 F | WEIGHT: 300 LBS | BODY MASS INDEX: 45.47 KG/M2 | OXYGEN SATURATION: 99 % | HEART RATE: 68 BPM | DIASTOLIC BLOOD PRESSURE: 74 MMHG | HEIGHT: 68 IN | RESPIRATION RATE: 16 BRPM | SYSTOLIC BLOOD PRESSURE: 138 MMHG

## 2025-07-02 DIAGNOSIS — Z59.00 HOMELESSNESS: ICD-10-CM

## 2025-07-02 DIAGNOSIS — F29 PSYCHOSIS, UNSPECIFIED PSYCHOSIS TYPE (MULTI): Primary | ICD-10-CM

## 2025-07-02 PROCEDURE — 99285 EMERGENCY DEPT VISIT HI MDM: CPT | Performed by: EMERGENCY MEDICINE

## 2025-07-02 PROCEDURE — 99284 EMERGENCY DEPT VISIT MOD MDM: CPT | Performed by: NURSE PRACTITIONER

## 2025-07-02 PROCEDURE — 99283 EMERGENCY DEPT VISIT LOW MDM: CPT

## 2025-07-02 SDOH — ECONOMIC STABILITY - HOUSING INSECURITY: HOMELESSNESS UNSPECIFIED: Z59.00

## 2025-07-02 ASSESSMENT — PAIN - FUNCTIONAL ASSESSMENT: PAIN_FUNCTIONAL_ASSESSMENT: 0-10

## 2025-07-02 ASSESSMENT — PAIN SCALES - GENERAL: PAINLEVEL_OUTOF10: 0 - NO PAIN
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
## 2025-07-02 NOTE — ED PROVIDER NOTES
HPI   Chief Complaint   Patient presents with    Psychiatric Evaluation              45-year-old male presents today after he was brought in by EMS with concern for hallucinations.  He is very well-known to our emergency department.  He does appear to have a problem with homelessness.  Patient states he has been taking his medication and he would like to leave.  He has no physical complaint.  He is denying pain.  He is denying dyspnea.  He is denying abdominal pain.  He is denying nausea or vomiting.  He denies headache.  He denies confusion.  He denies hallucinations.  It is reported that he was experiencing hallucinations and this is why EMS brought him in.  He denies fever or chills.  He denies skin rash.  Patient was found by EMS on the ground rolling in urine.  [place] bus stop.  He denies SI/HI.      History provided by:  Patient   used: No            Patient History   Medical History[1]  Surgical History[2]  Family History[3]  Social History[4]    Physical Exam   ED Triage Vitals   Temp Pulse Resp BP   -- -- -- --      SpO2 Temp src Heart Rate Source Patient Position   -- -- -- --      BP Location FiO2 (%)     -- --       Physical Exam  Constitutional:       Appearance: He is obese.   HENT:      Head: Normocephalic and atraumatic.   Cardiovascular:      Rate and Rhythm: Normal rate and regular rhythm.      Pulses: Normal pulses.      Heart sounds: Normal heart sounds.   Pulmonary:      Effort: Pulmonary effort is normal.      Breath sounds: Normal breath sounds.   Abdominal:      General: Abdomen is flat.      Palpations: Abdomen is soft.   Musculoskeletal:         General: Normal range of motion.      Cervical back: Normal range of motion and neck supple.   Skin:     General: Skin is warm.      Capillary Refill: Capillary refill takes less than 2 seconds.   Neurological:      General: No focal deficit present.      Mental Status: He is alert and oriented to person, place, and time.    Psychiatric:         Mood and Affect: Mood normal.         Behavior: Behavior normal.           ED Course & MDM   Diagnoses as of 07/02/25 0939   Psychosis, unspecified psychosis type (Multi)   Homelessness                 No data recorded                                 Medical Decision Making  Patient was brought in by EMS and does not appear to be in acute distress.  He is denying thoughts of harm to himself and others and is requesting discharge.  I staffed with attending who felt the patient could be safely discharged back into his environment.  Return precautions reviewed and safely discharged home.        Procedure  Procedures       RIGOBERTO Keita  07/02/25 0938       [1]   Past Medical History:  Diagnosis Date    Acute (undifferentiated) schizophrenia (Multi)    [2]   Past Surgical History:  Procedure Laterality Date    CT ANGIO AORTA AND BILATERAL ILIOFEMORAL RUN OFF INCLUDING WITHOUT CONTRAST IF PERFORMED  11/3/2022    CT AORTA AND BILATERAL ILIOFEMORAL RUNOFF ANGIOGRAM W AND/OR WO IV CONTRAST [place]20[place] [facility] [place]   [3] No family history on file.  [4]   Social History  Tobacco Use    Smoking status: Never    Smokeless tobacco: Never   Vaping Use    Vaping status: Never Used   Substance Use Topics    Alcohol use: Never    Drug use: Not on file        RIGOBERTO Keita  07/02/25 0939

## 2025-07-04 ENCOUNTER — HOSPITAL ENCOUNTER (EMERGENCY)
Facility: HOSPITAL | Age: 45
Discharge: HOME | End: 2025-07-04
Attending: EMERGENCY MEDICINE
Payer: COMMERCIAL

## 2025-07-04 ENCOUNTER — HOSPITAL ENCOUNTER (EMERGENCY)
Facility: HOSPITAL | Age: 45
Discharge: HOME | End: 2025-07-04
Payer: COMMERCIAL

## 2025-07-04 VITALS
OXYGEN SATURATION: 95 % | WEIGHT: 300 LBS | BODY MASS INDEX: 45.47 KG/M2 | TEMPERATURE: 98 F | DIASTOLIC BLOOD PRESSURE: 83 MMHG | HEIGHT: 68 IN | HEART RATE: 83 BPM | SYSTOLIC BLOOD PRESSURE: 146 MMHG | RESPIRATION RATE: 17 BRPM

## 2025-07-04 VITALS
HEIGHT: 64 IN | WEIGHT: 300 LBS | SYSTOLIC BLOOD PRESSURE: 119 MMHG | OXYGEN SATURATION: 95 % | DIASTOLIC BLOOD PRESSURE: 92 MMHG | TEMPERATURE: 97.2 F | HEART RATE: 99 BPM | BODY MASS INDEX: 51.22 KG/M2 | RESPIRATION RATE: 18 BRPM

## 2025-07-04 DIAGNOSIS — M79.605 LEG PAIN, BILATERAL: Primary | ICD-10-CM

## 2025-07-04 DIAGNOSIS — R60.0 BILATERAL LEG EDEMA: ICD-10-CM

## 2025-07-04 DIAGNOSIS — R60.0 BILATERAL LEG EDEMA: Primary | ICD-10-CM

## 2025-07-04 DIAGNOSIS — M79.604 LEG PAIN, BILATERAL: Primary | ICD-10-CM

## 2025-07-04 PROCEDURE — 2500000001 HC RX 250 WO HCPCS SELF ADMINISTERED DRUGS (ALT 637 FOR MEDICARE OP): Performed by: PHYSICIAN ASSISTANT

## 2025-07-04 PROCEDURE — 99282 EMERGENCY DEPT VISIT SF MDM: CPT

## 2025-07-04 PROCEDURE — 2500000001 HC RX 250 WO HCPCS SELF ADMINISTERED DRUGS (ALT 637 FOR MEDICARE OP)

## 2025-07-04 PROCEDURE — 99283 EMERGENCY DEPT VISIT LOW MDM: CPT | Performed by: EMERGENCY MEDICINE

## 2025-07-04 PROCEDURE — 99283 EMERGENCY DEPT VISIT LOW MDM: CPT | Performed by: PHYSICIAN ASSISTANT

## 2025-07-04 RX ORDER — ACETAMINOPHEN 325 MG/1
975 TABLET ORAL ONCE
Status: COMPLETED | OUTPATIENT
Start: 2025-07-04 | End: 2025-07-04

## 2025-07-04 RX ADMIN — ACETAMINOPHEN 975 MG: 325 TABLET ORAL at 13:32

## 2025-07-04 RX ADMIN — ACETAMINOPHEN 975 MG: 325 TABLET ORAL at 21:43

## 2025-07-04 ASSESSMENT — PAIN SCALES - GENERAL: PAINLEVEL_OUTOF10: 7

## 2025-07-04 ASSESSMENT — PAIN - FUNCTIONAL ASSESSMENT: PAIN_FUNCTIONAL_ASSESSMENT: 0-10

## 2025-07-04 ASSESSMENT — PAIN DESCRIPTION - PAIN TYPE: TYPE: ACUTE PAIN

## 2025-07-04 NOTE — DISCHARGE INSTRUCTIONS
You should elevate his legs when possible to help with lower extremity swelling.  You should continue to check your feet for new wounds or ulcers.  You can take Tylenol or ibuprofen for your pain.    You should return to the emergency department if you begin experiencing new or worsening symptoms, redness that travels up your leg

## 2025-07-04 NOTE — ED PROVIDER NOTES
History of Present Illness       Limitations to history: None  Independent Historian: patient  External Records Reviewed: EMR, outside records, Care-everywhere    HPI:  Patient 45-year-old male with schizophrenia who is well-known to this emergency department presents to the ED with complaints of acute on chronic right and left leg pain. Patient denies any recent trauma or injury. Patient states his legs have been red, swollen, and painful for a while.  No new changes.  Patient states his pain is minimal and would like pain medicine.  No associated tingling, numbness, or weakness.  Denies any new rashes, lesions, or leg wounds.  Patient denies fever, chills, chest pain, shortness of breath, abdominal pain, nausea vomiting, urinary or bowel changes.  Patient requesting ginger ale and food with his medication.      History provided by:  Patient   used: No      Physical Exam   Physical Exam  Vitals and nursing note reviewed.   Constitutional:       General: He is not in acute distress.     Appearance: Normal appearance. He is obese. He is not ill-appearing, toxic-appearing or diaphoretic.   HENT:      Head: Normocephalic and atraumatic.      Nose: Nose normal.      Mouth/Throat:      Mouth: Mucous membranes are moist.      Pharynx: Oropharynx is clear.   Eyes:      Extraocular Movements: Extraocular movements intact.      Conjunctiva/sclera: Conjunctivae normal.      Pupils: Pupils are equal, round, and reactive to light.   Cardiovascular:      Rate and Rhythm: Normal rate and regular rhythm.      Pulses: Normal pulses.      Heart sounds: Normal heart sounds. No murmur heard.     No friction rub. No gallop.   Pulmonary:      Effort: Pulmonary effort is normal. No respiratory distress.      Breath sounds: Normal breath sounds. No stridor. No wheezing, rhonchi or rales.   Abdominal:      General: Abdomen is flat.      Palpations: Abdomen is soft.   Musculoskeletal:      Cervical back: Normal range  Spoke with the patient he wasn't happy and wants some clarification on what procedure is being done or wanting to be done. States that while in hospital Tuesday he was made to think they weren't doing this procedure or a procedure at all. I've messaged the doctor and nursing staff to review and let me know how to proceed. of motion and neck supple.      Comments: Bilateral lower extremities with no obvious deformity or injury.  Bilateral foot and ankle edema without skin breakdown, abrasions, or wounds.  Bilateral chronic erythema without lymphatic streaking.  Well-perfused, warm.  Non-tender.  Bilateral lower extremity full, nontender ROM of knee, ankle, and toes.  No calf tenderness or overlying skin changes. Neurovascularly intact distally.  Pulses 1+.   Skin:     Capillary Refill: Capillary refill takes less than 2 seconds.      Comments: See above   Neurological:      General: No focal deficit present.      Mental Status: He is alert and oriented to person, place, and time. Mental status is at baseline.      Cranial Nerves: No cranial nerve deficit.      Sensory: No sensory deficit.      Motor: No weakness.      Coordination: Coordination normal.      Gait: Gait normal.   Psychiatric:         Mood and Affect: Mood normal.         Behavior: Behavior normal.        Triage vitals:  T 36.7 °C (98 °F)  HR 83  /83  RR 17  O2 95 % None (Room air)    Medical Decision Making & ED Course     ED Course & [facility]     Medical Decision Making  Patient is a 45-year-old male with a history of schizophrenia who presents to the ED with atraumatic acute on chronic leg pain and swelling.  Patient is well-known to the emergency department. Patient requesting ginger ale and food with his pain medication. Patient given tylenol for his symptoms. No  concern of skin breakdown, ulcerations, or foot wounds. Based on history and physical exam low concern for cellulitis, necrotizing fascitis, DVT, or fracture.    Patient seen at [facility], [place], operated by [facility] ED yesterday for similar symptoms.  Patient had x-ray of left lower extremity showing no acute fracture or dislocation no foreign bodies. I do not feel additional imaging is needed at this time.  On reassessment, patient endorsing improvement in his pain.  Patient likely experiencing chronic lower extremity edema and  "pain.  Encourage patient to elevate his legs when possible to help with swelling.  Patient should follow-up with primary care provider for his chronic leg edema and pain.  Patient would like to be discharged. Plan of care was discussed and patient was agreeable to the plan. All questions were answered.  Patient left before discharge paperwork could be given to him.    Patient was discussed and examined by Dr. Rm Min MD.      Amount and/or Complexity of Data Reviewed  External Data Reviewed: labs and notes.      ----    Visit Vitals  /83   Pulse 83   Temp 36.7 °C (98 °F) (Temporal)   Resp 17   Ht 1.727 m (5' 8\")   Wt 136 kg (300 lb)   SpO2 95%   BMI 45.61 kg/m²   Smoking Status Never   BSA 2.55 m²        Labs Reviewed - No data to display    No orders to display       ED Course:  ED Course as of 07/04/25 1353   Fri Jul 04, 2025   1350 ED ATTENDING SUMMARY:    45-year-old male with PMH chronic foot pain, undomiciled, presents with foot pain.  Patient reports no new injuries.  Reports his feet hurt similar to before.  Been seen multiple times in past few days for same including having negative x-rays.  Patient was requesting food to eat.  On exam, patient with chronic swelling both legs equal, full range of motion without significant pain.  Patient given Tylenol for pain control, was given food as well.  Plan for discharge with outpatient follow-up. [FF]      ED Course User Index  [FF] Rm Min MD         Diagnoses as of 07/04/25 1353   Leg pain, bilateral   Bilateral leg edema     Disposition     As result of the workup, the patient was discharged home.  Patient was informed of their diagnosis and instructed to come back with any concerns or worsening of condition, agreeable to the plan above.  Patient given the opportunity ask questions, all of the patient's questions were answered. Patient was stable at the time of discharge.      Procedures   Procedures    COMMENT: Please note this report has been " produced using speech recognition software and may contain errors related that system including errors in grammar, punctuation, spelling as well as words and phrases that may be inappropriate.  If there are any questions or concerns please feel free to contact the dictating provider for clarification.     María Benítez PA-C  Emergency Medicine      María Benítez PA-C  07/04/25 9976     26-Oct-2018 16:33

## 2025-07-05 ENCOUNTER — HOSPITAL ENCOUNTER (EMERGENCY)
Facility: HOSPITAL | Age: 45
Discharge: LEFT BEFORE DISCHARGE | End: 2025-07-06
Payer: COMMERCIAL

## 2025-07-05 NOTE — ED PROVIDER NOTES
HPI   Chief Complaint   Patient presents with    Leg Pain   This is a 45-year-old male with a past medical history significant for schizophrenia and chronic lower extremity edema who presents the ED for leg pain.  Patient states that he has had pain and swelling in both of his legs for a long time.  He reports that the pain is worse in his right leg.  He denies any recent falls or traumas.  Denies weakness, numbness, tingling or coolness in his lower extremities.    Upon review of patient's records, he is well-known to the ED and has been seen multiple times in the past.  He was most recently seen earlier today, endorsing similar symptoms.    Patient History   Medical History[1]  Surgical History[2]  Family History[3]  Social History[4]    Physical Exam   ED Triage Vitals [07/04/25 2130]   Temperature Heart Rate Respirations BP   36.2 °C (97.2 °F) 99 18 (!) 119/92      Pulse Ox Temp src Heart Rate Source Patient Position   95 % -- -- --      BP Location FiO2 (%)     -- --       Physical Exam  Constitutional:       General: He is not in acute distress.     Appearance: He is obese. He is not toxic-appearing or diaphoretic.   Cardiovascular:      Rate and Rhythm: Normal rate and regular rhythm.      Pulses:           Dorsalis pedis pulses are 2+ on the right side and 2+ on the left side.      Heart sounds: Normal heart sounds.   Pulmonary:      Effort: Pulmonary effort is normal. No respiratory distress.      Breath sounds: Normal breath sounds.   Abdominal:      Palpations: Abdomen is soft.      Tenderness: There is no abdominal tenderness.   Musculoskeletal:      Right lower leg: Edema present.      Left lower leg: Edema present.      Comments: Bilateral lower extremities are atraumatic in appearance, no gross deformities or obvious signs of injury.  There is edema present to bilateral lower extremities.  ROM is intact in both hips, knees and ankles  Overlying skin of the legs appears well perfused with temperature  equally warm to the touch throughout. No areas of ecchymosis, erythema, induration, fluctuance or increased warmth.  Equal limb circumference with no pitting edema, ulcers or varicosity.  No calf tenderness.        Skin:     General: Skin is warm and dry.      Findings: No bruising or erythema.   Neurological:      General: No focal deficit present.      Mental Status: He is alert.      Gait: Gait normal.       ED Course & MDM   Diagnoses as of 07/04/25 2136   Bilateral leg edema         Medical Decision Making  This is a 45-year-old male with a past medical history significant for schizophrenia and chronic lower extremity edema who presents the ED for leg pain.  Patient states that he has had pain and swelling in both of his legs for a long time.  He reports that the pain is worse in his right leg.  He denies any recent falls     On physical exam, patient is not ill-appearing, nondiaphoretic and in no acute distress. Bilateral lower extremities are atraumatic in appearance, no gross deformities or obvious signs of injury.  There is edema present to bilateral lower extremities.  ROM is intact in both hips, knees and ankles. Overlying skin of the legs appears well perfused with temperature equally warm to the touch throughout. No areas of ecchymosis, erythema, induration, fluctuance or increased warmth.  Equal limb circumference with no pitting edema, ulcers or varicosity.  No calf tenderness.  Pedal pulses are strong bilaterally.    Upon review of patient's records, he is well-known to the ED and has been seen multiple times in the past.  He was most recently seen earlier today, endorsing similar symptoms.  Bilateral leg swelling has been noted on prior ED visits.  Patient's symptoms are likely chronic in nature.  Deferred workup as there is low suspicion for acute pathology.  Administered Tylenol for pain.  Counseled patient to follow-up with a PCP.  Discussed ED return precautions.  Discharged in stable  condition           [1]   Past Medical History:  Diagnosis Date    Acute (undifferentiated) schizophrenia (Multi)    [2]   Past Surgical History:  Procedure Laterality Date    CT ANGIO AORTA AND BILATERAL ILIOFEMORAL RUN OFF INCLUDING WITHOUT CONTRAST IF PERFORMED  11/3/2022    CT AORTA AND BILATERAL ILIOFEMORAL RUNOFF ANGIOGRAM W AND/OR WO IV CONTRAST 11/3/2022 [facility] [place]   [3] No family history on file.  [4]   Social History  Tobacco Use    Smoking status: Never    Smokeless tobacco: Never   Vaping Use    Vaping status: Never Used   Substance Use Topics    Alcohol use: Never    Drug use: Not on file        Elda Jay PA-C  07/04/25 4579

## 2025-07-12 ENCOUNTER — HOSPITAL ENCOUNTER (EMERGENCY)
Facility: HOSPITAL | Age: 45
Discharge: LEFT BEFORE DISCHARGE | End: 2025-07-12
Payer: COMMERCIAL

## 2025-07-12 ENCOUNTER — HOSPITAL ENCOUNTER (EMERGENCY)
Facility: HOSPITAL | Age: 45
Discharge: OTHER INSTITUTION | End: 2025-07-12
Payer: COMMERCIAL

## 2025-07-12 VITALS
OXYGEN SATURATION: 94 % | TEMPERATURE: 97.4 F | HEIGHT: 64 IN | DIASTOLIC BLOOD PRESSURE: 102 MMHG | BODY MASS INDEX: 51.22 KG/M2 | RESPIRATION RATE: 16 BRPM | HEART RATE: 92 BPM | WEIGHT: 300 LBS | SYSTOLIC BLOOD PRESSURE: 154 MMHG

## 2025-07-12 PROCEDURE — 4500999001 HC ED NO CHARGE

## 2025-07-12 PROCEDURE — 99283 EMERGENCY DEPT VISIT LOW MDM: CPT

## 2025-07-12 PROCEDURE — 99281 EMR DPT VST MAYX REQ PHY/QHP: CPT

## 2025-07-12 ASSESSMENT — PAIN SCALES - GENERAL: PAINLEVEL_OUTOF10: 3

## 2025-07-12 ASSESSMENT — PAIN - FUNCTIONAL ASSESSMENT: PAIN_FUNCTIONAL_ASSESSMENT: 0-10

## 2025-07-12 NOTE — ED TRIAGE NOTES
C/O Bilateral foot pain starting today.  Pt stated he stepped on something, but not sure what. Pt is Sleeping, but easily aroused in triage. Pt is hypertensive, other VSS.

## 2025-07-16 ENCOUNTER — HOSPITAL ENCOUNTER (EMERGENCY)
Facility: HOSPITAL | Age: 45
Discharge: LEFT BEFORE DISCHARGE | End: 2025-07-16
Payer: COMMERCIAL

## 2025-07-16 VITALS
SYSTOLIC BLOOD PRESSURE: 161 MMHG | HEIGHT: 68 IN | OXYGEN SATURATION: 98 % | BODY MASS INDEX: 45.47 KG/M2 | TEMPERATURE: 98.2 F | WEIGHT: 300 LBS | HEART RATE: 98 BPM | DIASTOLIC BLOOD PRESSURE: 87 MMHG | RESPIRATION RATE: 16 BRPM

## 2025-07-16 PROCEDURE — 4500999001 HC ED NO CHARGE

## 2025-07-16 PROCEDURE — 99283 EMERGENCY DEPT VISIT LOW MDM: CPT

## 2025-07-16 ASSESSMENT — PAIN DESCRIPTION - ORIENTATION: ORIENTATION: RIGHT;LEFT

## 2025-07-16 ASSESSMENT — PAIN - FUNCTIONAL ASSESSMENT: PAIN_FUNCTIONAL_ASSESSMENT: 0-10

## 2025-07-16 ASSESSMENT — PAIN DESCRIPTION - LOCATION: LOCATION: LEG

## 2025-07-16 ASSESSMENT — PAIN SCALES - GENERAL: PAINLEVEL_OUTOF10: 5 - MODERATE PAIN

## 2025-07-20 ENCOUNTER — HOSPITAL ENCOUNTER (EMERGENCY)
Facility: HOSPITAL | Age: 45
Discharge: LEFT BEFORE DISCHARGE | End: 2025-07-20
Payer: COMMERCIAL

## 2025-07-21 ENCOUNTER — HOSPITAL ENCOUNTER (EMERGENCY)
Facility: HOSPITAL | Age: 45
Discharge: LEFT BEFORE DISCHARGE | End: 2025-07-22
Payer: COMMERCIAL

## 2025-07-21 VITALS
TEMPERATURE: 97.2 F | HEART RATE: 78 BPM | DIASTOLIC BLOOD PRESSURE: 92 MMHG | HEIGHT: 68 IN | RESPIRATION RATE: 18 BRPM | WEIGHT: 300 LBS | SYSTOLIC BLOOD PRESSURE: 151 MMHG | OXYGEN SATURATION: 95 % | BODY MASS INDEX: 45.47 KG/M2

## 2025-07-21 PROCEDURE — 4500999001 HC ED NO CHARGE

## 2025-07-21 PROCEDURE — 99283 EMERGENCY DEPT VISIT LOW MDM: CPT

## 2025-07-22 ENCOUNTER — HOSPITAL ENCOUNTER (EMERGENCY)
Facility: HOSPITAL | Age: 45
Discharge: HOME | End: 2025-07-23
Attending: EMERGENCY MEDICINE
Payer: COMMERCIAL

## 2025-07-22 DIAGNOSIS — M79.605 PAIN IN BOTH LOWER EXTREMITIES: Primary | ICD-10-CM

## 2025-07-22 DIAGNOSIS — M79.604 PAIN IN BOTH LOWER EXTREMITIES: Primary | ICD-10-CM

## 2025-07-22 PROCEDURE — 99283 EMERGENCY DEPT VISIT LOW MDM: CPT | Performed by: EMERGENCY MEDICINE

## 2025-07-22 ASSESSMENT — PAIN SCALES - GENERAL: PAINLEVEL_OUTOF10: 7

## 2025-07-22 ASSESSMENT — LIFESTYLE VARIABLES
HAVE PEOPLE ANNOYED YOU BY CRITICIZING YOUR DRINKING: NO
HAVE YOU EVER FELT YOU SHOULD CUT DOWN ON YOUR DRINKING: NO
TOTAL SCORE: 0
EVER HAD A DRINK FIRST THING IN THE MORNING TO STEADY YOUR NERVES TO GET RID OF A HANGOVER: NO
EVER FELT BAD OR GUILTY ABOUT YOUR DRINKING: NO

## 2025-07-22 ASSESSMENT — PAIN - FUNCTIONAL ASSESSMENT: PAIN_FUNCTIONAL_ASSESSMENT: 0-10

## 2025-07-22 ASSESSMENT — PAIN DESCRIPTION - DESCRIPTORS: DESCRIPTORS: ACHING

## 2025-07-22 ASSESSMENT — PAIN DESCRIPTION - PAIN TYPE: TYPE: ACUTE PAIN

## 2025-07-22 ASSESSMENT — PAIN DESCRIPTION - LOCATION: LOCATION: GROIN

## 2025-07-22 ASSESSMENT — PAIN DESCRIPTION - FREQUENCY: FREQUENCY: CONSTANT/CONTINUOUS

## 2025-07-22 NOTE — ED TRIAGE NOTES
45-year-old male who comes to the emergency department complaining of bilateral lower extremity pain. He states he lost his antibiotic. He reports that there is pus draining from his feet.

## 2025-07-23 VITALS
BODY MASS INDEX: 45.47 KG/M2 | TEMPERATURE: 98.2 F | SYSTOLIC BLOOD PRESSURE: 143 MMHG | WEIGHT: 300 LBS | DIASTOLIC BLOOD PRESSURE: 87 MMHG | HEIGHT: 68 IN | OXYGEN SATURATION: 96 % | RESPIRATION RATE: 18 BRPM | HEART RATE: 88 BPM

## 2025-07-23 NOTE — ED PROVIDER NOTES
"Emergency Department Provider Note        History of Present Illness     History provided by: {History Provided By:03187::\"Patient\"}  Limitations to History: {History Limitations:99161::\"None\"}  External Records Reviewed with Brief Summary: {ED External Records Reviewed with Brief Summary:96057::\"None\"}    HPI:  Anibal Caro is a 45 y.o. male ***    Physical Exam   Triage vitals:  T 36.6 °C (97.9 °F)  HR 93  BP (!) 141/100  RR 18  O2 94 % None (Room air)    {ED Physical Exam:03775}    Medical Decision Making & ED Course   Medical Decision Makin y.o. male ***  ----  {Scoring Tools Utilized:30854}    Differential diagnoses considered include but are not limited to: ***     Social Determinants of Health which Significantly Impact Care: {Social Determinants of Health which Significantly Impact Care:66900::\"None identified\"} {The following actions were taken to address these social determinants:14681}    EKG Independent Interpretation: {EKG Independent Interpretation:75946}    Independent Result Review and Interpretation: {Independent Result Review and Interpretation:48580::\"Relevant laboratory and radiographic results were reviewed and independently interpreted by myself.  As necessary, they are commented on in the ED Course.\"}    Chronic conditions affecting the patient's care: {Chronic conditions affecting the patient's care:61559::\"As documented above in MDM\"}    The patient was discussed with the following consultants/services: {The patient was discussed with the following consultants/services:97862::\"None\"}    Care Considerations: {Care Considerations:77373::\"As documented above in MDM\"}    ED Course:     Disposition   {ED Disposition:26530}    Procedures   Procedures    {ED Provider Level (Optional):40305}    Everette Blount MD  Emergency Medicine  " patient was discharged home.  he was informed of his diagnosis and instructed to come back with any concerns or worsening of condition.  he and was agreeable to the plan as discussed above.  he was given the opportunity to ask questions.  All of the patient's questions were answered.    Procedures   Procedures    Patient was seen independently    Everette Blount MD  Emergency Medicine     Everette Blount MD  08/08/25 7923

## 2025-07-26 ENCOUNTER — HOSPITAL ENCOUNTER (EMERGENCY)
Facility: HOSPITAL | Age: 45
Discharge: HOME | End: 2025-07-26
Payer: COMMERCIAL

## 2025-07-26 PROCEDURE — 99283 EMERGENCY DEPT VISIT LOW MDM: CPT

## 2025-07-26 PROCEDURE — 4500999001 HC ED NO CHARGE

## 2025-07-29 ENCOUNTER — HOSPITAL ENCOUNTER (EMERGENCY)
Facility: HOSPITAL | Age: 45
Discharge: LEFT BEFORE DISCHARGE | End: 2025-07-29
Payer: COMMERCIAL

## 2025-07-29 PROCEDURE — 99283 EMERGENCY DEPT VISIT LOW MDM: CPT

## 2025-07-29 PROCEDURE — 4500999001 HC ED NO CHARGE

## 2025-07-30 ENCOUNTER — HOSPITAL ENCOUNTER (EMERGENCY)
Facility: HOSPITAL | Age: 45
Discharge: HOME | End: 2025-07-30
Payer: COMMERCIAL

## 2025-07-30 PROCEDURE — 99283 EMERGENCY DEPT VISIT LOW MDM: CPT

## 2025-07-31 ENCOUNTER — HOSPITAL ENCOUNTER (EMERGENCY)
Facility: HOSPITAL | Age: 45
Discharge: HOME | End: 2025-07-31
Payer: COMMERCIAL

## 2025-07-31 PROCEDURE — 99283 EMERGENCY DEPT VISIT LOW MDM: CPT

## 2025-07-31 PROCEDURE — 4500999001 HC ED NO CHARGE

## 2025-08-08 ENCOUNTER — HOSPITAL ENCOUNTER (EMERGENCY)
Facility: HOSPITAL | Age: 45
Discharge: HOME | End: 2025-08-08
Payer: COMMERCIAL

## 2025-08-08 ENCOUNTER — HOSPITAL ENCOUNTER (EMERGENCY)
Facility: HOSPITAL | Age: 45
Discharge: HOME | End: 2025-08-09
Attending: EMERGENCY MEDICINE
Payer: COMMERCIAL

## 2025-08-08 VITALS
OXYGEN SATURATION: 100 % | HEART RATE: 99 BPM | TEMPERATURE: 97.5 F | DIASTOLIC BLOOD PRESSURE: 87 MMHG | HEIGHT: 68 IN | WEIGHT: 300 LBS | SYSTOLIC BLOOD PRESSURE: 134 MMHG | BODY MASS INDEX: 45.47 KG/M2

## 2025-08-08 DIAGNOSIS — F19.10 SUBSTANCE ABUSE: Primary | ICD-10-CM

## 2025-08-08 PROCEDURE — 4500999001 HC ED NO CHARGE

## 2025-08-08 PROCEDURE — 99283 EMERGENCY DEPT VISIT LOW MDM: CPT

## 2025-08-08 PROCEDURE — 99284 EMERGENCY DEPT VISIT MOD MDM: CPT | Performed by: EMERGENCY MEDICINE

## 2025-08-08 PROCEDURE — 99283 EMERGENCY DEPT VISIT LOW MDM: CPT | Performed by: EMERGENCY MEDICINE

## 2025-08-08 ASSESSMENT — PAIN - FUNCTIONAL ASSESSMENT: PAIN_FUNCTIONAL_ASSESSMENT: 0-10

## 2025-08-08 ASSESSMENT — PAIN SCALES - GENERAL: PAINLEVEL_OUTOF10: 0 - NO PAIN

## 2025-08-09 LAB
AMPHETAMINES UR QL SCN: ABNORMAL
BARBITURATES UR QL SCN: ABNORMAL
BENZODIAZ UR QL SCN: ABNORMAL
BZE UR QL SCN: ABNORMAL
CANNABINOIDS UR QL SCN: ABNORMAL
FENTANYL+NORFENTANYL UR QL SCN: ABNORMAL
METHADONE UR QL SCN: ABNORMAL
OPIATES UR QL SCN: ABNORMAL
OXYCODONE+OXYMORPHONE UR QL SCN: ABNORMAL
PCP UR QL SCN: ABNORMAL

## 2025-08-09 PROCEDURE — 80307 DRUG TEST PRSMV CHEM ANLYZR: CPT

## 2025-08-09 NOTE — ED TRIAGE NOTES
"States he would like detox from marijuana. When advised there is no detox for marijuana he states \"oh, crack too\".   "

## 2025-08-09 NOTE — ED PROVIDER NOTES
Emergency Department Provider Note        History of Present Illness     History provided by: Patient  Limitations to History: Mental Illness  External Records Reviewed with Brief Summary: None    HPI:  Anibal Caro is a 45 y.o. male with a history of schizophrenia who presents for detox from marijuana and cocaine.  Patient states that he would like to seek help for marijuana usage and cocaine usage.  He denies any suicidal or homicidal ideations.  He states that he is experiencing auditory hallucinations where demons are speaking to him, but he does not want to disclose what they are saying.  He was able to receive his 10 mg of Haldol last month but is due for his Haldol this month.  He denies any physical symptoms symptoms at this time and would like to speak to our [facility] team.    Physical Exam   Triage vitals:  T 36.4 °C (97.5 °F)  HR 99  /87  RR    O2 100 % None (Room air)    General: Awake, alert, in no acute distress.  Eyes: Gaze conjugate.  No scleral icterus or injection  HENT: Normo-cephalic, atraumatic. No stridor  CV: Regular rate, regular rhythm. Radial pulses 2+ bilaterally  Resp: Breathing non-labored, speaking in full sentences.  Clear to auscultation bilaterally  Psych: No homicidal or suicidal ideations.  States that he is hearing demons.  Laughs inappropriately.    Medical Decision Making & ED Course   Medical Decision Makin y.o. male with a history of schizophrenia who presents for detox from marijuana and cocaine use.  Patient does not have any medical complaints at this time.  He does not have any suicidal homicidal ideations at this time.  He is resting comfortably in wheelchair with stable vitals.  Patient is cleared from medical standpoint.  [facility] team was consulted for potential placement for patient into rehab facility.    Patient was signed out to oncoming provider pending rehab placement via [facility]  ----      Differential diagnoses considered include but are not limited  to: Substance abuse, schizophrenia     Social Determinants of Health which Significantly Impact Care: None identified     EKG Independent Interpretation: EKG not obtained    Independent Result Review and Interpretation: None obtained    Chronic conditions affecting the patient's care: As documented above in MDM    The patient was discussed with the following consultants/services: None    Care Considerations: As documented above in MDM    ED Course:  Diagnoses as of 08/09/25 0253   Substance abuse     Disposition   Patient would like to be transferred to a rehab center.  [facility] is work on getting patient placed.  Patient will likely signed out to oncoming provider pending placement into rehab facility.    Procedures   Procedures        Pedro Corey, DO  Emergency Medicine     Pedro Corey, DO  Resident  08/08/25 2346       Pedro Corey, DO  Resident  08/09/25 0040       Pedro Corey, DO  Resident  08/09/25 0253

## 2025-08-12 ENCOUNTER — HOSPITAL ENCOUNTER (EMERGENCY)
Facility: HOSPITAL | Age: 45
Discharge: OTHER INSTITUTION | End: 2025-08-12
Payer: COMMERCIAL

## 2025-08-12 PROCEDURE — 4500999001 HC ED NO CHARGE

## 2025-08-13 ENCOUNTER — HOSPITAL ENCOUNTER (EMERGENCY)
Facility: HOSPITAL | Age: 45
Discharge: LEFT BEFORE DISCHARGE | End: 2025-08-13
Payer: COMMERCIAL

## 2025-08-13 ENCOUNTER — HOSPITAL ENCOUNTER (EMERGENCY)
Facility: HOSPITAL | Age: 45
Discharge: HOME | End: 2025-08-13
Payer: COMMERCIAL

## 2025-08-13 VITALS
DIASTOLIC BLOOD PRESSURE: 84 MMHG | BODY MASS INDEX: 45.47 KG/M2 | OXYGEN SATURATION: 94 % | TEMPERATURE: 98.2 F | HEART RATE: 110 BPM | HEIGHT: 68 IN | RESPIRATION RATE: 18 BRPM | WEIGHT: 300 LBS | SYSTOLIC BLOOD PRESSURE: 162 MMHG

## 2025-08-13 PROCEDURE — 4500999001 HC ED NO CHARGE

## 2025-08-13 PROCEDURE — 99283 EMERGENCY DEPT VISIT LOW MDM: CPT

## 2025-08-13 ASSESSMENT — PAIN - FUNCTIONAL ASSESSMENT: PAIN_FUNCTIONAL_ASSESSMENT: 0-10

## 2025-08-13 ASSESSMENT — PAIN SCALES - GENERAL: PAINLEVEL_OUTOF10: 0 - NO PAIN

## 2025-08-14 ENCOUNTER — HOSPITAL ENCOUNTER (EMERGENCY)
Facility: HOSPITAL | Age: 45
Discharge: HOME | End: 2025-08-14
Payer: COMMERCIAL

## 2025-08-14 VITALS
SYSTOLIC BLOOD PRESSURE: 135 MMHG | TEMPERATURE: 98.8 F | RESPIRATION RATE: 16 BRPM | HEART RATE: 106 BPM | DIASTOLIC BLOOD PRESSURE: 74 MMHG | OXYGEN SATURATION: 94 %

## 2025-08-14 DIAGNOSIS — M25.532 LEFT WRIST PAIN: Primary | ICD-10-CM

## 2025-08-14 PROCEDURE — 99283 EMERGENCY DEPT VISIT LOW MDM: CPT

## 2025-09-02 ENCOUNTER — HOSPITAL ENCOUNTER (EMERGENCY)
Facility: HOSPITAL | Age: 45
Discharge: HOME | End: 2025-09-02
Payer: COMMERCIAL

## 2025-09-02 VITALS
WEIGHT: 300 LBS | HEIGHT: 68 IN | SYSTOLIC BLOOD PRESSURE: 152 MMHG | BODY MASS INDEX: 45.47 KG/M2 | TEMPERATURE: 97.3 F | RESPIRATION RATE: 16 BRPM | OXYGEN SATURATION: 99 % | DIASTOLIC BLOOD PRESSURE: 93 MMHG | HEART RATE: 75 BPM

## 2025-09-02 VITALS
RESPIRATION RATE: 16 BRPM | TEMPERATURE: 97.9 F | SYSTOLIC BLOOD PRESSURE: 152 MMHG | WEIGHT: 300 LBS | OXYGEN SATURATION: 96 % | HEART RATE: 79 BPM | HEIGHT: 68 IN | DIASTOLIC BLOOD PRESSURE: 87 MMHG | BODY MASS INDEX: 45.47 KG/M2

## 2025-09-02 DIAGNOSIS — G89.29 CHRONIC PAIN OF LEFT KNEE: Primary | ICD-10-CM

## 2025-09-02 DIAGNOSIS — M25.562 CHRONIC PAIN OF LEFT KNEE: Primary | ICD-10-CM

## 2025-09-02 PROCEDURE — 99281 EMR DPT VST MAYX REQ PHY/QHP: CPT

## 2025-09-02 PROCEDURE — 99283 EMERGENCY DEPT VISIT LOW MDM: CPT

## 2025-09-02 PROCEDURE — 99282 EMERGENCY DEPT VISIT SF MDM: CPT

## 2025-09-02 RX ORDER — ACETAMINOPHEN 325 MG/1
650 TABLET ORAL ONCE
Status: DISCONTINUED | OUTPATIENT
Start: 2025-09-02 | End: 2025-09-02

## 2025-09-02 ASSESSMENT — PAIN DESCRIPTION - PAIN TYPE
TYPE: ACUTE PAIN
TYPE: ACUTE PAIN

## 2025-09-02 ASSESSMENT — PAIN DESCRIPTION - FREQUENCY
FREQUENCY: CONSTANT/CONTINUOUS
FREQUENCY: CONSTANT/CONTINUOUS

## 2025-09-02 ASSESSMENT — PAIN DESCRIPTION - LOCATION
LOCATION: LEG
LOCATION: LEG

## 2025-09-02 ASSESSMENT — PAIN - FUNCTIONAL ASSESSMENT
PAIN_FUNCTIONAL_ASSESSMENT: 0-10
PAIN_FUNCTIONAL_ASSESSMENT: 0-10

## 2025-09-02 ASSESSMENT — PAIN DESCRIPTION - DESCRIPTORS
DESCRIPTORS: TIGHTNESS
DESCRIPTORS: TIGHTNESS

## 2025-09-02 ASSESSMENT — PAIN SCALES - GENERAL
PAINLEVEL_OUTOF10: 6
PAINLEVEL_OUTOF10: 6

## 2025-09-02 ASSESSMENT — PAIN DESCRIPTION - ORIENTATION: ORIENTATION: LEFT

## 2025-09-04 ENCOUNTER — HOSPITAL ENCOUNTER (EMERGENCY)
Facility: HOSPITAL | Age: 45
Discharge: HOME | End: 2025-09-04
Attending: EMERGENCY MEDICINE
Payer: COMMERCIAL

## 2025-09-04 VITALS
OXYGEN SATURATION: 96 % | HEIGHT: 71 IN | SYSTOLIC BLOOD PRESSURE: 102 MMHG | HEART RATE: 98 BPM | DIASTOLIC BLOOD PRESSURE: 72 MMHG | BODY MASS INDEX: 42 KG/M2 | WEIGHT: 300 LBS | RESPIRATION RATE: 16 BRPM | TEMPERATURE: 98.2 F

## 2025-09-04 DIAGNOSIS — F19.10 SUBSTANCE ABUSE: Primary | ICD-10-CM

## 2025-09-04 PROCEDURE — 99282 EMERGENCY DEPT VISIT SF MDM: CPT | Performed by: EMERGENCY MEDICINE

## 2025-09-04 ASSESSMENT — PAIN DESCRIPTION - ORIENTATION: ORIENTATION: LEFT

## 2025-09-04 ASSESSMENT — PAIN - FUNCTIONAL ASSESSMENT: PAIN_FUNCTIONAL_ASSESSMENT: 0-10

## 2025-09-04 ASSESSMENT — PAIN DESCRIPTION - LOCATION: LOCATION: WRIST

## 2025-09-04 ASSESSMENT — PAIN SCALES - GENERAL: PAINLEVEL_OUTOF10: 5 - MODERATE PAIN
